# Patient Record
Sex: FEMALE | Race: WHITE | NOT HISPANIC OR LATINO | Employment: OTHER | URBAN - METROPOLITAN AREA
[De-identification: names, ages, dates, MRNs, and addresses within clinical notes are randomized per-mention and may not be internally consistent; named-entity substitution may affect disease eponyms.]

---

## 2017-05-09 ENCOUNTER — GENERIC CONVERSION - ENCOUNTER (OUTPATIENT)
Dept: OTHER | Facility: OTHER | Age: 57
End: 2017-05-09

## 2017-07-12 ENCOUNTER — GENERIC CONVERSION - ENCOUNTER (OUTPATIENT)
Dept: OTHER | Facility: OTHER | Age: 57
End: 2017-07-12

## 2017-08-14 ENCOUNTER — ALLSCRIPTS OFFICE VISIT (OUTPATIENT)
Dept: OTHER | Facility: OTHER | Age: 57
End: 2017-08-14

## 2017-09-19 ENCOUNTER — TRANSCRIBE ORDERS (OUTPATIENT)
Dept: ADMINISTRATIVE | Facility: HOSPITAL | Age: 57
End: 2017-09-19

## 2017-09-19 DIAGNOSIS — Z12.31 VISIT FOR SCREENING MAMMOGRAM: Primary | ICD-10-CM

## 2017-10-20 DIAGNOSIS — M47.816 SPONDYLOSIS OF LUMBAR REGION WITHOUT MYELOPATHY OR RADICULOPATHY: ICD-10-CM

## 2017-10-20 DIAGNOSIS — E55.9 VITAMIN D DEFICIENCY: ICD-10-CM

## 2017-10-20 DIAGNOSIS — I73.00 RAYNAUD'S SYNDROME WITHOUT GANGRENE: ICD-10-CM

## 2017-10-25 ENCOUNTER — HOSPITAL ENCOUNTER (OUTPATIENT)
Dept: MAMMOGRAPHY | Facility: MEDICAL CENTER | Age: 57
Discharge: HOME | End: 2017-10-25
Payer: COMMERCIAL

## 2017-10-25 DIAGNOSIS — Z12.31 VISIT FOR SCREENING MAMMOGRAM: ICD-10-CM

## 2017-10-25 PROCEDURE — 77063 BREAST TOMOSYNTHESIS BI: CPT

## 2017-10-25 PROCEDURE — G0202 SCR MAMMO BI INCL CAD: HCPCS

## 2017-11-28 ENCOUNTER — APPOINTMENT (OUTPATIENT)
Dept: LAB | Facility: CLINIC | Age: 57
End: 2017-11-28
Payer: COMMERCIAL

## 2017-11-28 ENCOUNTER — TRANSCRIBE ORDERS (OUTPATIENT)
Dept: LAB | Facility: CLINIC | Age: 57
End: 2017-11-28

## 2017-11-28 DIAGNOSIS — I73.00 RAYNAUD'S SYNDROME WITHOUT GANGRENE: ICD-10-CM

## 2017-11-28 DIAGNOSIS — M47.816 SPONDYLOSIS OF LUMBAR REGION WITHOUT MYELOPATHY OR RADICULOPATHY: ICD-10-CM

## 2017-11-28 DIAGNOSIS — E55.9 VITAMIN D DEFICIENCY: ICD-10-CM

## 2017-11-28 LAB
25(OH)D3 SERPL-MCNC: 35.8 NG/ML (ref 30–100)
ALBUMIN SERPL BCP-MCNC: 4.2 G/DL (ref 3.5–5)
ALP SERPL-CCNC: 48 U/L (ref 46–116)
ALT SERPL W P-5'-P-CCNC: 30 U/L (ref 12–78)
ANION GAP SERPL CALCULATED.3IONS-SCNC: 5 MMOL/L (ref 4–13)
AST SERPL W P-5'-P-CCNC: 22 U/L (ref 5–45)
BASOPHILS # BLD AUTO: 0.02 THOUSANDS/ΜL (ref 0–0.1)
BASOPHILS NFR BLD AUTO: 0 % (ref 0–1)
BILIRUB SERPL-MCNC: 0.84 MG/DL (ref 0.2–1)
BILIRUB UR QL STRIP: NEGATIVE
BUN SERPL-MCNC: 19 MG/DL (ref 5–25)
CALCIUM SERPL-MCNC: 9.6 MG/DL (ref 8.3–10.1)
CHLORIDE SERPL-SCNC: 101 MMOL/L (ref 100–108)
CHOLEST SERPL-MCNC: 226 MG/DL (ref 50–200)
CLARITY UR: CLEAR
CO2 SERPL-SCNC: 29 MMOL/L (ref 21–32)
COLOR UR: YELLOW
CREAT SERPL-MCNC: 0.79 MG/DL (ref 0.6–1.3)
EOSINOPHIL # BLD AUTO: 0.1 THOUSAND/ΜL (ref 0–0.61)
EOSINOPHIL NFR BLD AUTO: 2 % (ref 0–6)
ERYTHROCYTE [DISTWIDTH] IN BLOOD BY AUTOMATED COUNT: 12.7 % (ref 11.6–15.1)
GFR SERPL CREATININE-BSD FRML MDRD: 83 ML/MIN/1.73SQ M
GLUCOSE P FAST SERPL-MCNC: 96 MG/DL (ref 65–99)
GLUCOSE UR STRIP-MCNC: NEGATIVE MG/DL
HCT VFR BLD AUTO: 44 % (ref 34.8–46.1)
HDLC SERPL-MCNC: 105 MG/DL (ref 40–60)
HGB BLD-MCNC: 15.2 G/DL (ref 11.5–15.4)
HGB UR QL STRIP.AUTO: NEGATIVE
KETONES UR STRIP-MCNC: NEGATIVE MG/DL
LDLC SERPL CALC-MCNC: 106 MG/DL (ref 0–100)
LEUKOCYTE ESTERASE UR QL STRIP: NEGATIVE
LYMPHOCYTES # BLD AUTO: 1.53 THOUSANDS/ΜL (ref 0.6–4.47)
LYMPHOCYTES NFR BLD AUTO: 29 % (ref 14–44)
MCH RBC QN AUTO: 32.5 PG (ref 26.8–34.3)
MCHC RBC AUTO-ENTMCNC: 34.5 G/DL (ref 31.4–37.4)
MCV RBC AUTO: 94 FL (ref 82–98)
MONOCYTES # BLD AUTO: 0.32 THOUSAND/ΜL (ref 0.17–1.22)
MONOCYTES NFR BLD AUTO: 6 % (ref 4–12)
NEUTROPHILS # BLD AUTO: 3.28 THOUSANDS/ΜL (ref 1.85–7.62)
NEUTS SEG NFR BLD AUTO: 63 % (ref 43–75)
NITRITE UR QL STRIP: NEGATIVE
NRBC BLD AUTO-RTO: 0 /100 WBCS
PH UR STRIP.AUTO: 7 [PH] (ref 4.5–8)
PLATELET # BLD AUTO: 225 THOUSANDS/UL (ref 149–390)
PMV BLD AUTO: 11.6 FL (ref 8.9–12.7)
POTASSIUM SERPL-SCNC: 4.3 MMOL/L (ref 3.5–5.3)
PROT SERPL-MCNC: 7.5 G/DL (ref 6.4–8.2)
PROT UR STRIP-MCNC: NEGATIVE MG/DL
RBC # BLD AUTO: 4.68 MILLION/UL (ref 3.81–5.12)
SODIUM SERPL-SCNC: 135 MMOL/L (ref 136–145)
SP GR UR STRIP.AUTO: 1.01 (ref 1–1.03)
TRIGL SERPL-MCNC: 75 MG/DL
UROBILINOGEN UR QL STRIP.AUTO: 0.2 E.U./DL
WBC # BLD AUTO: 5.25 THOUSAND/UL (ref 4.31–10.16)

## 2017-11-28 PROCEDURE — 81003 URINALYSIS AUTO W/O SCOPE: CPT

## 2017-11-28 PROCEDURE — 85025 COMPLETE CBC W/AUTO DIFF WBC: CPT

## 2017-11-28 PROCEDURE — 80053 COMPREHEN METABOLIC PANEL: CPT

## 2017-11-28 PROCEDURE — 36415 COLL VENOUS BLD VENIPUNCTURE: CPT

## 2017-11-28 PROCEDURE — 82306 VITAMIN D 25 HYDROXY: CPT

## 2017-11-28 PROCEDURE — 80061 LIPID PANEL: CPT

## 2017-12-07 ENCOUNTER — APPOINTMENT (OUTPATIENT)
Dept: LAB | Facility: CLINIC | Age: 57
End: 2017-12-07
Payer: COMMERCIAL

## 2017-12-07 ENCOUNTER — ALLSCRIPTS OFFICE VISIT (OUTPATIENT)
Dept: OTHER | Facility: OTHER | Age: 57
End: 2017-12-07

## 2017-12-07 DIAGNOSIS — L65.9 NONSCARRING HAIR LOSS: ICD-10-CM

## 2017-12-07 LAB — TSH SERPL DL<=0.05 MIU/L-ACNC: 1.6 UIU/ML (ref 0.36–3.74)

## 2017-12-07 PROCEDURE — 36415 COLL VENOUS BLD VENIPUNCTURE: CPT

## 2017-12-07 PROCEDURE — 84443 ASSAY THYROID STIM HORMONE: CPT

## 2018-01-10 NOTE — PROGRESS NOTES
Assessment    1  Encounter for preventive health examination (V70 0) (Z00 00)   2  Degenerative joint disease (DJD) of lumbar spine (721 3) (S79 416)    Discussion/Summary    Tarah Rodriguez is up-to-date on preventive care and is managing her chronic back pain well  Her lifestyle is optimal  Lab work reviewed with her today was also optimal including normal vitamin D level with vitamin D supplementation  Follow-up will be as needed and otherwise in one year  Chief Complaint  pt is here for a physical exam      History of Present Illness  HPI: Tarah Rodriguez is here for her annual preventive exam  Recently her gynecologist sent her mammogram and DEXA scan, remarkable for negative mammogram and mild osteopenia  Tarah Rodriguez had a colonoscopy at age 48 that was negative and is on a 10 year recall list is negative GI review of systems  She sees her eye doctor annually and has reading glasses with stable vision  Chronic 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     She sees her dermatologist once a year plus as needed as her father  of melanoma earlier this year  Her dermatologist is aware of this  Alex reports no new skin lesions  She has been doing physical therapy and using an inversion table for chronic low back arthritis related problems including degenerative disc disease, occasional sciatic pain, and having to lift at work as a physical therapist for many years  She now gets help from her physical therapy assistance so she does less lifting  Her sciatic pain has resolved  She is trying to decide whether or not to change her schedule to part-time work  She enjoys her work and doesn't want to quit  Review of Systems    Constitutional: No fever, no chills, feels well, no tiredness, no recent weight gain or weight loss  Eyes: No complaints of eye pain, no red eyes, no eyesight problems, no discharge, no dry eyes, no itching of eyes     ENT: no complaints of earache, no loss of hearing, no nose bleeds, no nasal discharge, no sore throat, no hoarseness  Cardiovascular: No complaints of slow heart rate, no fast heart rate, no chest pain, no palpitations, no leg claudication, no lower extremity edema  Respiratory: No complaints of shortness of breath, no wheezing, no cough, no SOB on exertion, no orthopnea, no PND  Gastrointestinal: No complaints of abdominal pain, no constipation, no nausea or vomiting, no diarrhea, no bloody stools  Genitourinary: No complaints of dysuria, no incontinence, no pelvic pain, no dysmenorrhea, no vaginal discharge or bleeding  Musculoskeletal: arthralgias and joint stiffness, but no joint swelling, no limb pain, no myalgias and no limb swelling  Integumentary: No complaints of skin rash or lesions, no itching, no skin wounds, no breast pain or lump  Neurological: No complaints of headache, no confusion, no convulsions, no numbness, no dizziness or fainting, no tingling, no limb weakness, no difficulty walking  Psychiatric: Not suicidal, no sleep disturbance, no anxiety or depression, no change in personality, no emotional problems  Endocrine: No complaints of proptosis, no hot flashes, no muscle weakness, no deepening of the voice, no feelings of weakness  Hematologic/Lymphatic: No complaints of swollen glands, no swollen glands in the neck, does not bleed easily, does not bruise easily  Active Problems    1  Allergic rhinitis (477 9) (J30 9)   2  Raynaud's syndrome without gangrene (443 0) (I73 00)   3  Rosacea (695 3) (L71 9)   4   Vitamin D deficiency (268 9) (E55 9)    Family History  Mother    · Family history of atrial fibrillation (V17 49) (Z82 49)   · Family history of Heart disease   · Family history of Hypertension  Father    · Family history of Melanoma  Sister    · Family history of atrial fibrillation (V17 49) (Z82 49)  Family History    · Family history of Heart Disease (V17 49)   · Family history of Hypertension (V17 49)    Social History    · Being A Social Drinker   · Never A Smoker   · Denied: History of Tobacco Use    Current Meds   1  Fluticasone Propionate 50 MCG/ACT Nasal Suspension; 2 sprays in each nostril at   bedtime; Therapy: [id number] to (Last Rx:19Nov2015)  Requested for: [id number] Ordered   2  Vitamin D3 1000 UNIT Oral Capsule; TAKE 4 CAPSULE Daily; Therapy: [id number] to (Last Rx:19Nov2015) Ordered    Allergies    1  No Known Drug Allergies    Vitals   Recorded: 21Nov2016 04:23PM   Heart Rate 70   Systolic 683   Diastolic 82   Height 5 ft 4 in   Weight 122 lb 6 oz   BMI Calculated 21 01   BSA Calculated 1 59   O2 Saturation 94     Physical Exam    Constitutional   General appearance: No acute distress, well appearing and well nourished  Head and Face   Head and face: Normal     Eyes   Conjunctiva and lids: No swelling, erythema or discharge  Pupils and irises: Equal, round, reactive to light  Ears, Nose, Mouth, and Throat   External inspection of ears and nose: Normal     Otoscopic examination: Tympanic membranes translucent with normal light reflex  Canals patent without erythema  Hearing: Normal     Nasal mucosa, septum, and turbinates: Normal without edema or erythema  Lips, teeth, and gums: Normal, good dentition  Oropharynx: Normal with no erythema, edema, exudate or lesions  Neck   Neck: Supple, symmetric, trachea midline, no masses  Thyroid: Normal, no thyromegaly  Pulmonary   Respiratory effort: No increased work of breathing or signs of respiratory distress  Percussion of chest: Normal     Auscultation of lungs: Clear to auscultation  Cardiovascular   Auscultation of heart: Normal rate and rhythm, normal S1 and S2, no murmurs  Carotid pulses: 2+ bilaterally  Abdominal aorta: Normal     Pedal pulses: 2+ bilaterally      Peripheral vascular exam: Normal     Examination of extremities for edema and/or varicosities: Normal     Abdomen   Abdomen: Non-tender, no masses  Liver and spleen: No hepatomegaly or splenomegaly  Examination for hernias: No hernia appreciated  Lymphatic   Palpation of lymph nodes in neck: No lymphadenopathy  Palpation of lymph nodes in other areas: No lymphadenopathy  Musculoskeletal   Gait and station: Normal     Digits and nails: Normal without clubbing or cyanosis  Joints, bones, and muscles: Normal     Range of motion: Normal     Stability: Normal     Muscle strength/tone: Normal     Skin   Skin and subcutaneous tissue: Normal without rashes or lesions  Neurologic   Cranial nerves: Cranial nerves II-XII intact  Cortical function: Normal mental status  Sensation: No sensory loss  Coordination: Normal finger to nose and heel to shin  Psychiatric   Judgment and insight: Normal     Orientation to person, place, and time: Normal     Recent and remote memory: Intact      Mood and affect: Normal        Signatures   Electronically signed by : JANY Maravilla ; Nov 21 2016  4:58PM EST                       (Author)

## 2018-01-13 VITALS
BODY MASS INDEX: 21.25 KG/M2 | OXYGEN SATURATION: 98 % | WEIGHT: 124.5 LBS | HEIGHT: 64 IN | DIASTOLIC BLOOD PRESSURE: 80 MMHG | SYSTOLIC BLOOD PRESSURE: 150 MMHG | TEMPERATURE: 97.8 F | HEART RATE: 64 BPM

## 2018-01-23 VITALS
BODY MASS INDEX: 20.86 KG/M2 | OXYGEN SATURATION: 98 % | DIASTOLIC BLOOD PRESSURE: 66 MMHG | SYSTOLIC BLOOD PRESSURE: 118 MMHG | HEART RATE: 74 BPM | WEIGHT: 122.19 LBS | HEIGHT: 64 IN

## 2018-01-23 NOTE — PROGRESS NOTES
Assessment   1  Encounter for preventive health examination (V70 0) (Z00 00)    Plan  Alopecia of scalp    · (1) TSH; Status:Active; Requested for:[id number];     Discussion/Summary    Prevention: Alec Roberts will continue to see Dermatology every 6 months with father dying earlier this year melanoma  She will also make an appointment for an eye exam as it has been 5 years  Vision is excellent and she wears reading glasses  Gyn care is up-to-date  Annual lab work was reviewed today from November twenty-eighth, and was ideal     Lifestyle is also ideal with adjustment in work to part-time, with last stress, more time exercise and she is eating well  Flu shot was given, and also Zostavax and Prevnar were administered today, with Pneumovax in 1 year  Shaina Diaz was encouraged to call between annual visits for any questions or problems and the next visit was scheduled for 1 year  Chief Complaint  Yearly well exam      History of Present Illness  HPI: Shaina Diaz is here for annual preventive exam and feels well  We reviewed her November 28th lab work which was ideal     Shaina Diaz exercises regularly, has great exercise tolerance, no history of heart disease and very favorable lipids  Alec Roberts has history of low back pain but this is not an active problem and she avoids high impact physical activity  Flonase is controlling her allergies year-round  She sees Dermatology every 6 months because her father has a history of melanoma and actually passed away earlier this year from melanoma  She reports no new skin lesions  She is also seen by Dermatology for rosacea  She also is noticing some thinning of her hair since menopause, and did have some thinning of her hair earlier life, and baldness runs in her mother's side of the family   There is no history of thyroid disease but thought it be worthwhile to check a TSH which was ordered at this time and I did suggest that Alec Roberts discuss her thinning hair with her dermatologist as well     Immunizations were updated today  Colonoscopy was performed at age 48, GI review of systems is negative and Marley Granados is on a 10 year recall list     Gyn care is up-to-date  I examinations are on up-to-date and she has excellent vision but I did suggest an eye exam     Review of systems: As above, all others negative      Active Problems   1  Allergic rhinitis (477 9) (J30 9)  2  Degenerative joint disease (DJD) of lumbar spine (721 3) (M47 816)  3  Raynaud's syndrome without gangrene (443 0) (I73 00)  4  Rosacea (695 3) (L71 9)  5  Vitamin D deficiency (268 9) (E55 9)    Family History  Mother    · Family history of atrial fibrillation (V17 49) (Z82 49)   · Family history of Heart disease   · Family history of Hypertension  Father    · Family history of Melanoma  Sister    · Family history of atrial fibrillation (V17 49) (Z82 49)  Family History    · Family history of Heart Disease (V17 49)   · Family history of Hypertension (V17 49)    Social History    · Being A Social Drinker   · Never a smoker   · Denied: History of Tobacco Use    Current Meds  1  Fluticasone Propionate 50 MCG/ACT Nasal Suspension; 2 sprays in each nostril at   bedtime; Therapy: [id number] to (Last Rx:28Nov2016)  Requested for: 28Nov2016 Ordered  2  Vitamin D3 1000 UNIT Oral Capsule; TAKE 4 CAPSULE Daily; Therapy: [id number] to (Last Rx:19Nov2015) Ordered    Allergies   1  No Known Drug Allergies    Vitals   Recorded: 25SEC7596 01:06PM   Heart Rate 74   Systolic 376, LUE, Sitting   Diastolic 72, LUE, Sitting   Height 5 ft 4 in   Weight 122 lb 3 oz   BMI Calculated 20 97   BSA Calculated 1 59   O2 Saturation 98     Physical Exam    Constitutional   General appearance: No acute distress, well appearing and well nourished  Head and Face   Head and face: Normal     Palpation of the face and sinuses: No sinus tenderness  Eyes   Conjunctiva and lids: No swelling, erythema or discharge      Pupils and irises: Equal, round, reactive to light     Ears, Nose, Mouth, and Throat   External inspection of ears and nose: Normal     Otoscopic examination: Tympanic membranes translucent with normal light reflex  Canals patent without erythema  Hearing: Normal     Nasal mucosa, septum, and turbinates: Normal without edema or erythema  Lips, teeth, and gums: Normal, good dentition  Oropharynx: Normal with no erythema, edema, exudate or lesions  Neck   Neck: Supple, symmetric, trachea midline, no masses  Thyroid: Normal, no thyromegaly  Pulmonary   Respiratory effort: No increased work of breathing or signs of respiratory distress  Auscultation of lungs: Clear to auscultation  Cardiovascular   Auscultation of heart: Normal rate and rhythm, normal S1 and S2, no murmurs  Carotid pulses: 2+ bilaterally  Abdominal aorta: Normal     Pedal pulses: 2+ bilaterally  Peripheral vascular exam: Normal     Examination of extremities for edema and/or varicosities: Normal     Abdomen   Abdomen: Non-tender, no masses  Liver and spleen: No hepatomegaly or splenomegaly  Examination for hernias: No hernia appreciated  Lymphatic   Palpation of lymph nodes in neck: No lymphadenopathy  Palpation of lymph nodes in other areas: No lymphadenopathy  Musculoskeletal   Gait and station: Normal     Digits and nails: Normal without clubbing or cyanosis  Joints, bones, and muscles: Normal     Range of motion: Normal     Stability: Normal     Muscle strength/tone: Normal     Skin No change of mild rosacea of the face, with some thinning of hair of the scalp, especially frontal scalp  Neurologic   Cranial nerves: Cranial nerves II-XII intact  Cortical function: Normal mental status  Sensation: No sensory loss  Coordination: Normal finger to nose and heel to shin  Psychiatric   Judgment and insight: Normal     Orientation to person, place, and time: Normal     Recent and remote memory: Intact      Mood and affect: Normal  Signatures   Electronically signed by : JANY Blair ; Dec  7 2017  2:13PM EST                       (Author)

## 2018-07-31 DIAGNOSIS — J30.9 ALLERGIC RHINITIS, UNSPECIFIED SEASONALITY, UNSPECIFIED TRIGGER: Primary | ICD-10-CM

## 2018-07-31 RX ORDER — FLUTICASONE PROPIONATE 50 MCG
SPRAY, SUSPENSION (ML) NASAL
Qty: 1 BOTTLE | Refills: 2 | Status: SHIPPED | OUTPATIENT
Start: 2018-07-31 | End: 2019-06-24 | Stop reason: SDUPTHER

## 2018-10-15 ENCOUNTER — TELEPHONE (OUTPATIENT)
Dept: INTERNAL MEDICINE CLINIC | Facility: CLINIC | Age: 58
End: 2018-10-15

## 2018-10-15 DIAGNOSIS — Z00.00 WELL ADULT EXAM: Primary | ICD-10-CM

## 2018-10-15 NOTE — TELEPHONE ENCOUNTER
Pt will be in 12/10 for a physical  She would like to have labs prior to her appt  Which labs would you like?

## 2018-10-24 ENCOUNTER — TRANSCRIBE ORDERS (OUTPATIENT)
Dept: ADMINISTRATIVE | Facility: HOSPITAL | Age: 58
End: 2018-10-24

## 2018-10-24 DIAGNOSIS — Z12.39 SCREENING BREAST EXAMINATION: Primary | ICD-10-CM

## 2018-10-24 DIAGNOSIS — R92.2 DENSE BREAST: ICD-10-CM

## 2018-11-16 ENCOUNTER — HOSPITAL ENCOUNTER (OUTPATIENT)
Dept: MAMMOGRAPHY | Facility: CLINIC | Age: 58
Discharge: HOME | End: 2018-11-16
Payer: COMMERCIAL

## 2018-11-16 ENCOUNTER — HOSPITAL ENCOUNTER (OUTPATIENT)
Dept: ULTRASOUND IMAGING | Facility: CLINIC | Age: 58
Discharge: HOME | End: 2018-11-16
Payer: COMMERCIAL

## 2018-11-16 VITALS — WEIGHT: 115 LBS | HEIGHT: 64 IN | BODY MASS INDEX: 19.63 KG/M2

## 2018-11-16 DIAGNOSIS — R92.2 DENSE BREAST: ICD-10-CM

## 2018-11-16 DIAGNOSIS — Z12.39 SCREENING BREAST EXAMINATION: ICD-10-CM

## 2018-11-16 PROCEDURE — 76377 3D RENDER W/INTRP POSTPROCES: CPT

## 2018-11-16 PROCEDURE — 77063 BREAST TOMOSYNTHESIS BI: CPT

## 2018-11-16 PROCEDURE — 77067 SCR MAMMO BI INCL CAD: CPT

## 2018-11-16 PROCEDURE — 76641 ULTRASOUND BREAST COMPLETE: CPT

## 2018-12-05 ENCOUNTER — APPOINTMENT (OUTPATIENT)
Dept: LAB | Facility: CLINIC | Age: 58
End: 2018-12-05
Payer: COMMERCIAL

## 2018-12-05 DIAGNOSIS — Z00.00 WELL ADULT EXAM: ICD-10-CM

## 2018-12-05 LAB
ALBUMIN SERPL BCP-MCNC: 4.4 G/DL (ref 3.5–5)
ALP SERPL-CCNC: 39 U/L (ref 46–116)
ALT SERPL W P-5'-P-CCNC: 31 U/L (ref 12–78)
ANION GAP SERPL CALCULATED.3IONS-SCNC: 7 MMOL/L (ref 4–13)
AST SERPL W P-5'-P-CCNC: 20 U/L (ref 5–45)
BASOPHILS # BLD AUTO: 0.04 THOUSANDS/ΜL (ref 0–0.1)
BASOPHILS NFR BLD AUTO: 1 % (ref 0–1)
BILIRUB SERPL-MCNC: 1.04 MG/DL (ref 0.2–1)
BILIRUB UR QL STRIP: NEGATIVE
BUN SERPL-MCNC: 14 MG/DL (ref 5–25)
CALCIUM SERPL-MCNC: 9.4 MG/DL (ref 8.3–10.1)
CHLORIDE SERPL-SCNC: 99 MMOL/L (ref 100–108)
CHOLEST SERPL-MCNC: 233 MG/DL (ref 50–200)
CLARITY UR: CLEAR
CO2 SERPL-SCNC: 28 MMOL/L (ref 21–32)
COLOR UR: YELLOW
CREAT SERPL-MCNC: 0.79 MG/DL (ref 0.6–1.3)
EOSINOPHIL # BLD AUTO: 0.05 THOUSAND/ΜL (ref 0–0.61)
EOSINOPHIL NFR BLD AUTO: 1 % (ref 0–6)
ERYTHROCYTE [DISTWIDTH] IN BLOOD BY AUTOMATED COUNT: 12.6 % (ref 11.6–15.1)
GFR SERPL CREATININE-BSD FRML MDRD: 83 ML/MIN/1.73SQ M
GLUCOSE P FAST SERPL-MCNC: 95 MG/DL (ref 65–99)
GLUCOSE UR STRIP-MCNC: NEGATIVE MG/DL
HCT VFR BLD AUTO: 46.5 % (ref 34.8–46.1)
HDLC SERPL-MCNC: 112 MG/DL (ref 40–60)
HGB BLD-MCNC: 15.3 G/DL (ref 11.5–15.4)
HGB UR QL STRIP.AUTO: NEGATIVE
IMM GRANULOCYTES # BLD AUTO: 0 THOUSAND/UL (ref 0–0.2)
IMM GRANULOCYTES NFR BLD AUTO: 0 % (ref 0–2)
KETONES UR STRIP-MCNC: NEGATIVE MG/DL
LDLC SERPL CALC-MCNC: 108 MG/DL (ref 0–100)
LEUKOCYTE ESTERASE UR QL STRIP: NEGATIVE
LYMPHOCYTES # BLD AUTO: 1.57 THOUSANDS/ΜL (ref 0.6–4.47)
LYMPHOCYTES NFR BLD AUTO: 41 % (ref 14–44)
MCH RBC QN AUTO: 32.8 PG (ref 26.8–34.3)
MCHC RBC AUTO-ENTMCNC: 32.9 G/DL (ref 31.4–37.4)
MCV RBC AUTO: 100 FL (ref 82–98)
MONOCYTES # BLD AUTO: 0.33 THOUSAND/ΜL (ref 0.17–1.22)
MONOCYTES NFR BLD AUTO: 9 % (ref 4–12)
NEUTROPHILS # BLD AUTO: 1.82 THOUSANDS/ΜL (ref 1.85–7.62)
NEUTS SEG NFR BLD AUTO: 48 % (ref 43–75)
NITRITE UR QL STRIP: NEGATIVE
NONHDLC SERPL-MCNC: 121 MG/DL
NRBC BLD AUTO-RTO: 0 /100 WBCS
PH UR STRIP.AUTO: 7 [PH] (ref 4.5–8)
PLATELET # BLD AUTO: 216 THOUSANDS/UL (ref 149–390)
PMV BLD AUTO: 11.1 FL (ref 8.9–12.7)
POTASSIUM SERPL-SCNC: 4.4 MMOL/L (ref 3.5–5.3)
PROT SERPL-MCNC: 7.5 G/DL (ref 6.4–8.2)
PROT UR STRIP-MCNC: NEGATIVE MG/DL
RBC # BLD AUTO: 4.66 MILLION/UL (ref 3.81–5.12)
SODIUM SERPL-SCNC: 134 MMOL/L (ref 136–145)
SP GR UR STRIP.AUTO: 1.01 (ref 1–1.03)
TRIGL SERPL-MCNC: 66 MG/DL
TSH SERPL DL<=0.05 MIU/L-ACNC: 1.13 UIU/ML (ref 0.36–3.74)
UROBILINOGEN UR QL STRIP.AUTO: 0.2 E.U./DL
WBC # BLD AUTO: 3.81 THOUSAND/UL (ref 4.31–10.16)

## 2018-12-05 PROCEDURE — 81003 URINALYSIS AUTO W/O SCOPE: CPT

## 2018-12-05 PROCEDURE — 36415 COLL VENOUS BLD VENIPUNCTURE: CPT

## 2018-12-05 PROCEDURE — 80061 LIPID PANEL: CPT

## 2018-12-05 PROCEDURE — 84443 ASSAY THYROID STIM HORMONE: CPT

## 2018-12-05 PROCEDURE — 80053 COMPREHEN METABOLIC PANEL: CPT

## 2018-12-05 PROCEDURE — 85025 COMPLETE CBC W/AUTO DIFF WBC: CPT

## 2018-12-10 ENCOUNTER — OFFICE VISIT (OUTPATIENT)
Dept: INTERNAL MEDICINE CLINIC | Facility: CLINIC | Age: 58
End: 2018-12-10
Payer: COMMERCIAL

## 2018-12-10 VITALS
TEMPERATURE: 97.4 F | OXYGEN SATURATION: 98 % | HEIGHT: 64 IN | BODY MASS INDEX: 20.08 KG/M2 | DIASTOLIC BLOOD PRESSURE: 72 MMHG | HEART RATE: 51 BPM | SYSTOLIC BLOOD PRESSURE: 138 MMHG | WEIGHT: 117.6 LBS

## 2018-12-10 DIAGNOSIS — Z23 NEED FOR PNEUMOCOCCAL VACCINE: ICD-10-CM

## 2018-12-10 DIAGNOSIS — J30.9 ALLERGIC RHINITIS, UNSPECIFIED SEASONALITY, UNSPECIFIED TRIGGER: ICD-10-CM

## 2018-12-10 DIAGNOSIS — I73.00 RAYNAUD'S SYNDROME WITHOUT GANGRENE: ICD-10-CM

## 2018-12-10 DIAGNOSIS — Z00.00 WELL ADULT EXAM: ICD-10-CM

## 2018-12-10 DIAGNOSIS — M47.816 OSTEOARTHRITIS OF LUMBAR SPINE, UNSPECIFIED SPINAL OSTEOARTHRITIS COMPLICATION STATUS: ICD-10-CM

## 2018-12-10 DIAGNOSIS — D75.1 POLYCYTHEMIA: Primary | ICD-10-CM

## 2018-12-10 PROBLEM — L65.9 ALOPECIA OF SCALP: Status: ACTIVE | Noted: 2017-12-07

## 2018-12-10 PROCEDURE — 90732 PPSV23 VACC 2 YRS+ SUBQ/IM: CPT

## 2018-12-10 PROCEDURE — 90471 IMMUNIZATION ADMIN: CPT

## 2018-12-10 PROCEDURE — 99396 PREV VISIT EST AGE 40-64: CPT | Performed by: INTERNAL MEDICINE

## 2018-12-10 RX ORDER — DIPHENOXYLATE HYDROCHLORIDE AND ATROPINE SULFATE 2.5; .025 MG/1; MG/1
1 TABLET ORAL DAILY
COMMUNITY
End: 2019-12-23

## 2018-12-10 RX ORDER — BIOTIN 1 MG
2 TABLET ORAL DAILY
COMMUNITY
Start: 2013-08-15

## 2018-12-10 RX ORDER — CONJUGATED ESTROGENS 0.62 MG/G
CREAM VAGINAL
Refills: 0 | COMMUNITY
Start: 2018-10-24 | End: 2020-10-05 | Stop reason: SDUPTHER

## 2018-12-10 NOTE — PROGRESS NOTES
Assessment/Plan   Problem List Items Addressed This Visit     Allergic rhinitis    Degenerative joint disease (DJD) of lumbar spine    Raynaud's syndrome without gangrene      Other Visit Diagnoses     Polycythemia    -  Primary    Relevant Orders    CBC and differential    Well adult exam          [place] is lifestyle is optimal as she exercises 7 days a week and has excellent exercise tolerance  She had a colonoscopy age 48, normal with negative GI review of systems and no increased risk in the family, or personally so next colonoscopy is age 61 and she is 62  She received a flu shot this season and received Pneumovax today with Prevnar last year  Mammogram and ultrasound was November 16th by her gyn doctor at the time of her annual gyn visit which is also up-to-date  Baylee Gr does not have an optometrist or an ophthalmologist with stable vision but is in need of screening eye care  She was referred  Dental care is up-to-date  Dermatology exams are every 6 months with family history of melanoma  Annual labs from December 5th were reviewed including slight elevation of red blood count, most likely due to hemoconcentration as she had this drawn after vigorous exercise, so CBC will be recheck  Screening for sleep apnea on history and exam is negative, there is no history of smoking or high altitude exposure, and hemoglobin is only mildly elevated and there is no clinical indication of polycythemia vera  CMP right after exercise was appropriate including slight decrease in sodium of 134, chloride 99, and alkaline phosphatase is chronically low at 39 and this is not significant  Bilirubin slightly elevated 1 04  Glucose was 95 with GFR of 83  TSH was normal 1 130  Lipid panels optimal because of very high HDL cholesterol 112 with total cholesterol 237  Urinalysis was negative      Chronic low back pain has responded to modified exercise program which is low-impact without significant discomfort and no interference with lifestyle  Seasonal allergies are well controlled with fluticasone nasal spray  We discussed follow-up which can be in 1 year, sooner as needed  Subjective   Patient ID: Hima Lacey is a 62 y o  female here for annual exam and discussion of chronic medical problems  Raissa Garcia feels well overall  Vitals:    12/10/18 1335   BP: 138/72   Pulse: (!) 51   Temp: (!) 97 4 °F (36 3 °C)   SpO2: 98%     HPI   Preventive care was reviewed and please see the assessment  There are no acute problems today  Exercise routine and diet are optimal   Home life is going well and 1 of her sons is getting  in June and she is looking for to this  Raissa Garcia is enjoying [employer] 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  The following portions of the patient's history were reviewed and updated as appropriate: allergies, current medications, past family history, past medical history, past social history, past surgical history and problem list     Review of Systems some morning stiffness in low back and some pain with activity, and also see above, all others negative  Objective   Physical Exam   Vital signs stable with regular pulse  No acute distress  HEENT exam is normal   Hearing and vision grossly normal   Limited skin exam shows no rash or skin malignancy  Head neck without adenopathy or mass  Neck supple without trauma  No JVD  No carotid bruits in carotid pulses are normal   Thyroid exam normal on inspection and palpation  Trachea midline without stridor  Lungs clear to auscultation and normal to percussion posteriorly  There is no kyphosis or scoliosis noted  There is no supraclavicular adenopathy  Cardiac:  Decreased rate, regular rhythm, normal S1 and S2, no murmur, no S4 or S3  Abdomen:  Normal bowel sounds, nondistended, soft and nontender, no hernia or periumbilical adenopathy, no masses or bruits organomegaly are present    Extremities:  Mild Raynaud's phenomenon of fingers today, no nail disease  The no peripheral edema and arterial pulses are normal   Gait stable normal   No neurologic abnormalities  No cognitive deficit    Affect normal       Patient Active Problem List   Diagnosis    Allergic rhinitis    Alopecia of scalp    Degenerative joint disease (DJD) of lumbar spine    Raynaud's syndrome without gangrene    Rosacea    Vitamin D deficiency     Current Outpatient Prescriptions:     Cholecalciferol (VITAMIN D3) 1000 units CAPS, Take 4 capsules by mouth daily, Disp: , Rfl:     fluticasone (FLONASE) 50 mcg/act nasal spray, INSTILL 2 SPRAYS IN EACH NOSTRIL AT BEDTIME, Disp: 1 Bottle, Rfl: 2    Multiple Vitamins-Minerals (CENTRUM SILVER 50+WOMEN) TABS, Take by mouth, Disp: , Rfl:     multivitamin (THERAGRAN) TABS, Take 1 tablet by mouth daily, Disp: , Rfl:     PREMARIN vaginal cream, 1 GM INTRAVAGINALLY 2 TIMES WEEKLY FOR 4 WEEKS, EVERY 2 MONTHS, Disp: , Rfl: 0

## 2019-04-30 ENCOUNTER — APPOINTMENT (OUTPATIENT)
Dept: LAB | Facility: CLINIC | Age: 59
End: 2019-04-30
Payer: COMMERCIAL

## 2019-04-30 DIAGNOSIS — D75.1 POLYCYTHEMIA: ICD-10-CM

## 2019-04-30 LAB
BASOPHILS # BLD AUTO: 0.03 THOUSANDS/ΜL (ref 0–0.1)
BASOPHILS NFR BLD AUTO: 1 % (ref 0–1)
EOSINOPHIL # BLD AUTO: 0.06 THOUSAND/ΜL (ref 0–0.61)
EOSINOPHIL NFR BLD AUTO: 1 % (ref 0–6)
ERYTHROCYTE [DISTWIDTH] IN BLOOD BY AUTOMATED COUNT: 12.3 % (ref 11.6–15.1)
HCT VFR BLD AUTO: 44.9 % (ref 34.8–46.1)
HGB BLD-MCNC: 15.1 G/DL (ref 11.5–15.4)
IMM GRANULOCYTES # BLD AUTO: 0.01 THOUSAND/UL (ref 0–0.2)
IMM GRANULOCYTES NFR BLD AUTO: 0 % (ref 0–2)
LYMPHOCYTES # BLD AUTO: 1.62 THOUSANDS/ΜL (ref 0.6–4.47)
LYMPHOCYTES NFR BLD AUTO: 27 % (ref 14–44)
MCH RBC QN AUTO: 33.3 PG (ref 26.8–34.3)
MCHC RBC AUTO-ENTMCNC: 33.6 G/DL (ref 31.4–37.4)
MCV RBC AUTO: 99 FL (ref 82–98)
MONOCYTES # BLD AUTO: 0.52 THOUSAND/ΜL (ref 0.17–1.22)
MONOCYTES NFR BLD AUTO: 9 % (ref 4–12)
NEUTROPHILS # BLD AUTO: 3.75 THOUSANDS/ΜL (ref 1.85–7.62)
NEUTS SEG NFR BLD AUTO: 62 % (ref 43–75)
NRBC BLD AUTO-RTO: 0 /100 WBCS
PLATELET # BLD AUTO: 197 THOUSANDS/UL (ref 149–390)
PMV BLD AUTO: 11.3 FL (ref 8.9–12.7)
RBC # BLD AUTO: 4.54 MILLION/UL (ref 3.81–5.12)
WBC # BLD AUTO: 5.99 THOUSAND/UL (ref 4.31–10.16)

## 2019-04-30 PROCEDURE — 36415 COLL VENOUS BLD VENIPUNCTURE: CPT

## 2019-04-30 PROCEDURE — 85025 COMPLETE CBC W/AUTO DIFF WBC: CPT

## 2019-05-02 ENCOUNTER — TELEPHONE (OUTPATIENT)
Dept: INTERNAL MEDICINE CLINIC | Facility: CLINIC | Age: 59
End: 2019-05-02

## 2019-06-24 DIAGNOSIS — J30.9 ALLERGIC RHINITIS, UNSPECIFIED SEASONALITY, UNSPECIFIED TRIGGER: ICD-10-CM

## 2019-06-24 RX ORDER — FLUTICASONE PROPIONATE 50 MCG
SPRAY, SUSPENSION (ML) NASAL
Qty: 1 BOTTLE | Refills: 2 | Status: SHIPPED | OUTPATIENT
Start: 2019-06-24 | End: 2020-07-06 | Stop reason: SDUPTHER

## 2019-08-02 ENCOUNTER — OFFICE VISIT (OUTPATIENT)
Dept: INTERNAL MEDICINE CLINIC | Facility: CLINIC | Age: 59
End: 2019-08-02
Payer: COMMERCIAL

## 2019-08-02 VITALS
HEART RATE: 68 BPM | HEIGHT: 64 IN | DIASTOLIC BLOOD PRESSURE: 80 MMHG | WEIGHT: 113 LBS | SYSTOLIC BLOOD PRESSURE: 138 MMHG | TEMPERATURE: 98.5 F | BODY MASS INDEX: 19.29 KG/M2

## 2019-08-02 DIAGNOSIS — J02.9 PHARYNGITIS, UNSPECIFIED ETIOLOGY: Primary | ICD-10-CM

## 2019-08-02 PROCEDURE — 1036F TOBACCO NON-USER: CPT | Performed by: INTERNAL MEDICINE

## 2019-08-02 PROCEDURE — 3008F BODY MASS INDEX DOCD: CPT | Performed by: INTERNAL MEDICINE

## 2019-08-02 PROCEDURE — 99213 OFFICE O/P EST LOW 20 MIN: CPT | Performed by: INTERNAL MEDICINE

## 2019-08-02 RX ORDER — AMOXICILLIN 500 MG/1
500 CAPSULE ORAL EVERY 8 HOURS SCHEDULED
Qty: 30 CAPSULE | Refills: 0 | Status: SHIPPED | OUTPATIENT
Start: 2019-08-02 | End: 2019-08-12

## 2019-08-02 NOTE — PROGRESS NOTES
Assessment/Plan:     Diagnoses and all orders for this visit:    Pharyngitis, unspecified etiology  -     amoxicillin (AMOXIL) 500 mg capsule; Take 1 capsule (500 mg total) by mouth every 8 (eight) hours for 10 days          Subjective:      Patient ID: Enrique Duffy is a 61 y o  female here today with a 4 day history of increasing sore throat, with some pain with swallowing, no fever or chills, no earache, no cough, no rash, with some slight body aches  Tahir Uribe had exudate of pharyngitis 1 month ago while away after a family wedding and then later travel to [place], was run down and had similar symptoms for about 5 days before being treated  She did respond to amoxicillin  Tahir Uribe is retired and tends to exercise every day even if she does not feel well, tends to spent a lot time volunteering, and time out of doors  She may be a bit run down and may also have had sick contacts, so we discussed rest, fluids, staying in an air-conditioned environment, ibuprofen as needed, and amoxicillin for 10 days  Tahir Uribe was told to call back if not feeling better or for any new developments in her illness  Prognosis seems good for full recovery  HPI    The following portions of the patient's history were reviewed and updated as appropriate: allergies, current medications, past family history, past medical history, past social history, past surgical history and problem list     Review of Systems  no abdominal pain, nausea vomiting or diarrhea  No UTI symptoms      Objective:      /80 (BP Location: Left arm, Patient Position: Sitting, Cuff Size: Standard)   Pulse 68   Temp 98 5 °F (36 9 °C) (Tympanic)   Ht 5' 4" (1 626 m)   Wt 51 3 kg (113 lb)   PF 99 L/min   BMI 19 40 kg/m²      Wt Readings from Last 3 Encounters:   08/02/19 51 3 kg (113 lb)   12/10/18 53 3 kg (117 lb 9 6 oz)   11/16/18 52 2 kg (115 lb)     Temp Readings from Last 3 Encounters:   08/02/19 98 5 °F (36 9 °C) (Tympanic)   12/10/18 (!) 97 4 °F (36 3 °C) (Tympanic)   08/14/17 97 8 °F (36 6 °C)     BP Readings from Last 3 Encounters:   08/02/19 138/80   12/10/18 138/72   12/07/17 118/66     Pulse Readings from Last 3 Encounters:   08/02/19 68   12/10/18 (!) 51   12/07/17 74        Physical Exam    Vital signs stable, afebrile and appears nontoxic  Skin well hydrated without rash  Neck supple  HEENT exam notable for erythema of the pharynx without exudates or hemorrhages, no airway compromise  Nasal passages unremarkable as well as exam of the ears  There is mild submandibular adenopathy with slight tenderness, otherwise no other adenopathy, lungs clear cardiac exam is normal   Patient Active Problem List   Diagnosis    Allergic rhinitis    Alopecia of scalp    Degenerative joint disease (DJD) of lumbar spine    Raynaud's syndrome without gangrene    Rosacea    Vitamin D deficiency       Current Outpatient Medications on File Prior to Visit   Medication Sig Dispense Refill    fluticasone (FLONASE) 50 mcg/act nasal spray INSTILL 2 SPRAYS IN EACH NOSTRIL AT BEDTIME 1 Bottle 2    PREMARIN vaginal cream 1 GM INTRAVAGINALLY 2 TIMES WEEKLY FOR 4 WEEKS, EVERY 2 MONTHS  0    Cholecalciferol (VITAMIN D3) 1000 units CAPS Take 4 capsules by mouth daily      Multiple Vitamins-Minerals (CENTRUM SILVER 50+WOMEN) TABS Take by mouth      multivitamin (THERAGRAN) TABS Take 1 tablet by mouth daily       No current facility-administered medications on file prior to visit

## 2019-10-29 ENCOUNTER — TRANSCRIBE ORDERS (OUTPATIENT)
Dept: ADMINISTRATIVE | Facility: HOSPITAL | Age: 59
End: 2019-10-29

## 2019-10-29 DIAGNOSIS — Z12.31 VISIT FOR SCREENING MAMMOGRAM: Primary | ICD-10-CM

## 2019-12-03 ENCOUNTER — TELEPHONE (OUTPATIENT)
Dept: INTERNAL MEDICINE CLINIC | Facility: CLINIC | Age: 59
End: 2019-12-03

## 2019-12-03 DIAGNOSIS — Z13.220 SCREENING FOR HYPERLIPIDEMIA: ICD-10-CM

## 2019-12-03 DIAGNOSIS — M47.816 OSTEOARTHRITIS OF LUMBAR SPINE, UNSPECIFIED SPINAL OSTEOARTHRITIS COMPLICATION STATUS: Primary | ICD-10-CM

## 2019-12-03 DIAGNOSIS — I73.00 RAYNAUD'S SYNDROME WITHOUT GANGRENE: ICD-10-CM

## 2019-12-11 ENCOUNTER — TRANSCRIBE ORDERS (OUTPATIENT)
Dept: LAB | Facility: CLINIC | Age: 59
End: 2019-12-11

## 2019-12-11 ENCOUNTER — APPOINTMENT (OUTPATIENT)
Dept: LAB | Facility: CLINIC | Age: 59
End: 2019-12-11
Payer: COMMERCIAL

## 2019-12-11 LAB
ALBUMIN SERPL BCP-MCNC: 4.9 G/DL (ref 3.5–5)
ALP SERPL-CCNC: 44 U/L (ref 46–116)
ALT SERPL W P-5'-P-CCNC: 35 U/L (ref 12–78)
ANION GAP SERPL CALCULATED.3IONS-SCNC: 6 MMOL/L (ref 4–13)
AST SERPL W P-5'-P-CCNC: 22 U/L (ref 5–45)
BASOPHILS # BLD AUTO: 0.03 THOUSANDS/ΜL (ref 0–0.1)
BASOPHILS NFR BLD AUTO: 1 % (ref 0–1)
BILIRUB SERPL-MCNC: 0.79 MG/DL (ref 0.2–1)
BILIRUB UR QL STRIP: NEGATIVE
BUN SERPL-MCNC: 16 MG/DL (ref 5–25)
CALCIUM SERPL-MCNC: 9.9 MG/DL (ref 8.3–10.1)
CHLORIDE SERPL-SCNC: 101 MMOL/L (ref 100–108)
CHOLEST SERPL-MCNC: 252 MG/DL (ref 50–200)
CLARITY UR: CLEAR
CO2 SERPL-SCNC: 30 MMOL/L (ref 21–32)
COLOR UR: YELLOW
CREAT SERPL-MCNC: 0.76 MG/DL (ref 0.6–1.3)
EOSINOPHIL # BLD AUTO: 0.08 THOUSAND/ΜL (ref 0–0.61)
EOSINOPHIL NFR BLD AUTO: 2 % (ref 0–6)
ERYTHROCYTE [DISTWIDTH] IN BLOOD BY AUTOMATED COUNT: 12.3 % (ref 11.6–15.1)
GFR SERPL CREATININE-BSD FRML MDRD: 86 ML/MIN/1.73SQ M
GLUCOSE P FAST SERPL-MCNC: 94 MG/DL (ref 65–99)
GLUCOSE UR STRIP-MCNC: NEGATIVE MG/DL
HCT VFR BLD AUTO: 46.4 % (ref 34.8–46.1)
HDLC SERPL-MCNC: 106 MG/DL
HGB BLD-MCNC: 15.5 G/DL (ref 11.5–15.4)
HGB UR QL STRIP.AUTO: NEGATIVE
IMM GRANULOCYTES # BLD AUTO: 0.01 THOUSAND/UL (ref 0–0.2)
IMM GRANULOCYTES NFR BLD AUTO: 0 % (ref 0–2)
KETONES UR STRIP-MCNC: NEGATIVE MG/DL
LDLC SERPL CALC-MCNC: 134 MG/DL (ref 0–100)
LEUKOCYTE ESTERASE UR QL STRIP: NEGATIVE
LYMPHOCYTES # BLD AUTO: 1.51 THOUSANDS/ΜL (ref 0.6–4.47)
LYMPHOCYTES NFR BLD AUTO: 33 % (ref 14–44)
MCH RBC QN AUTO: 32.6 PG (ref 26.8–34.3)
MCHC RBC AUTO-ENTMCNC: 33.4 G/DL (ref 31.4–37.4)
MCV RBC AUTO: 98 FL (ref 82–98)
MONOCYTES # BLD AUTO: 0.35 THOUSAND/ΜL (ref 0.17–1.22)
MONOCYTES NFR BLD AUTO: 8 % (ref 4–12)
NEUTROPHILS # BLD AUTO: 2.62 THOUSANDS/ΜL (ref 1.85–7.62)
NEUTS SEG NFR BLD AUTO: 56 % (ref 43–75)
NITRITE UR QL STRIP: NEGATIVE
NONHDLC SERPL-MCNC: 146 MG/DL
NRBC BLD AUTO-RTO: 0 /100 WBCS
PH UR STRIP.AUTO: 7 [PH]
PLATELET # BLD AUTO: 223 THOUSANDS/UL (ref 149–390)
PMV BLD AUTO: 10.7 FL (ref 8.9–12.7)
POTASSIUM SERPL-SCNC: 4.5 MMOL/L (ref 3.5–5.3)
PROT SERPL-MCNC: 7.8 G/DL (ref 6.4–8.2)
PROT UR STRIP-MCNC: NEGATIVE MG/DL
RBC # BLD AUTO: 4.75 MILLION/UL (ref 3.81–5.12)
SODIUM SERPL-SCNC: 137 MMOL/L (ref 136–145)
SP GR UR STRIP.AUTO: 1.01 (ref 1–1.03)
TRIGL SERPL-MCNC: 60 MG/DL
UROBILINOGEN UR QL STRIP.AUTO: 0.2 E.U./DL
WBC # BLD AUTO: 4.6 THOUSAND/UL (ref 4.31–10.16)

## 2019-12-11 PROCEDURE — 85025 COMPLETE CBC W/AUTO DIFF WBC: CPT | Performed by: INTERNAL MEDICINE

## 2019-12-11 PROCEDURE — 80061 LIPID PANEL: CPT | Performed by: INTERNAL MEDICINE

## 2019-12-11 PROCEDURE — 80053 COMPREHEN METABOLIC PANEL: CPT | Performed by: INTERNAL MEDICINE

## 2019-12-11 PROCEDURE — 81003 URINALYSIS AUTO W/O SCOPE: CPT | Performed by: INTERNAL MEDICINE

## 2019-12-11 PROCEDURE — 36415 COLL VENOUS BLD VENIPUNCTURE: CPT | Performed by: INTERNAL MEDICINE

## 2019-12-12 ENCOUNTER — HOSPITAL ENCOUNTER (OUTPATIENT)
Dept: MAMMOGRAPHY | Facility: CLINIC | Age: 59
Discharge: HOME | End: 2019-12-12
Payer: COMMERCIAL

## 2019-12-12 VITALS — BODY MASS INDEX: 19.29 KG/M2 | HEIGHT: 64 IN | WEIGHT: 113 LBS

## 2019-12-12 DIAGNOSIS — Z12.31 VISIT FOR SCREENING MAMMOGRAM: ICD-10-CM

## 2019-12-12 PROCEDURE — 77063 BREAST TOMOSYNTHESIS BI: CPT

## 2019-12-12 PROCEDURE — 77067 SCR MAMMO BI INCL CAD: CPT

## 2019-12-16 ENCOUNTER — TELEPHONE (OUTPATIENT)
Dept: INTERNAL MEDICINE CLINIC | Facility: CLINIC | Age: 59
End: 2019-12-16

## 2019-12-16 NOTE — TELEPHONE ENCOUNTER
Please notify Emani Pretty that I did review these labs and just reviewed again and will discuss at her upcoming appointment  Anastasiia's labs are stable if you would inform her  Thanks

## 2019-12-23 ENCOUNTER — APPOINTMENT (OUTPATIENT)
Dept: LAB | Facility: CLINIC | Age: 59
End: 2019-12-23
Payer: COMMERCIAL

## 2019-12-23 ENCOUNTER — OFFICE VISIT (OUTPATIENT)
Dept: INTERNAL MEDICINE CLINIC | Facility: CLINIC | Age: 59
End: 2019-12-23
Payer: COMMERCIAL

## 2019-12-23 VITALS
WEIGHT: 117.6 LBS | BODY MASS INDEX: 20.08 KG/M2 | HEART RATE: 67 BPM | TEMPERATURE: 98.1 F | RESPIRATION RATE: 15 BRPM | DIASTOLIC BLOOD PRESSURE: 64 MMHG | HEIGHT: 64 IN | SYSTOLIC BLOOD PRESSURE: 118 MMHG

## 2019-12-23 DIAGNOSIS — Z00.00 WELL ADULT EXAM: Primary | ICD-10-CM

## 2019-12-23 DIAGNOSIS — D75.1 POLYCYTHEMIA: ICD-10-CM

## 2019-12-23 DIAGNOSIS — I73.00 RAYNAUD'S SYNDROME WITHOUT GANGRENE: ICD-10-CM

## 2019-12-23 PROCEDURE — 36415 COLL VENOUS BLD VENIPUNCTURE: CPT

## 2019-12-23 PROCEDURE — 82668 ASSAY OF ERYTHROPOIETIN: CPT

## 2019-12-23 PROCEDURE — 99396 PREV VISIT EST AGE 40-64: CPT | Performed by: INTERNAL MEDICINE

## 2019-12-23 NOTE — PROGRESS NOTES
Assessment/Plan:     Diagnoses and all orders for this visit:    Well adult exam    Polycythemia  -     Erythropoietin; Future    Raynaud's syndrome without gangrene          Subjective:      Patient ID: Ramon Og is a 61 y o  female  HPI  Isaiha Asher is here today for annual preventive exam and is feeling well  She had her  recently retired and are going to spend the collier in [place] and looking for to this  Isaiah Asher is very active, does a lot of aerobic activity and has borderline hemoglobin and hematocrit, and recent hemoglobin hematocrit are once again mildly elevated 15 5, and 46 4 respectively, on December 11th, similar to last year with intervening CBC showing high normal hemoglobin hematocrit earlier this year  Screening for sleep apnea is negative and there is no high altitude exposure, no history of smoking, and this may reflect significant aerobic conditioning, and we did discuss her concerns about polycythemia vera which she read about  She does not have pleuritis or fatigue, and is reasonable to or erythropoietin level  Today's exam was unremarkable and she will be notified of results  CBC is stable with very high , indicating mild elevation of   CMP shows alkaline phosphatase slightly low at 44, not clinically significant, blood sugar 94 with negative urinalysis as well  Radha Cam feels well and is up-to-date on preventive care  We discussed the new shingles vaccine and she wants to wait until she comes back from [place] will call the office for nursing visit for vaccination  Radha Cam was encouraged to return in 6 months, sooner as needed  Lifestyle is optimal was encouraged to continue    The following portions of the patient's history were reviewed and updated as appropriate: allergies, current medications, past family history, past medical history, past social history, past surgical history and problem list     Review of Systems  no change in occasional Raynaud's symptoms  Also see above, all others negative  Objective:      /64 (BP Location: Left arm, Patient Position: Sitting, Cuff Size: Standard)   Pulse 67   Temp 98 1 °F (36 7 °C)   Resp 15   Ht 5' 4" (1 626 m)   Wt 53 3 kg (117 lb 9 6 oz)   BMI 20 19 kg/m²      Wt Readings from Last 3 Encounters:   12/23/19 53 3 kg (117 lb 9 6 oz)   12/12/19 51 3 kg (113 lb)   08/02/19 51 3 kg (113 lb)     Temp Readings from Last 3 Encounters:   12/23/19 98 1 °F (36 7 °C)   08/02/19 98 5 °F (36 9 °C) (Tympanic)   12/10/18 (!) 97 4 °F (36 3 °C) (Tympanic)     BP Readings from Last 3 Encounters:   12/23/19 118/64   08/02/19 138/80   12/10/18 138/72     Pulse Readings from Last 3 Encounters:   12/23/19 67   08/02/19 68   12/10/18 (!) 51        Physical Exam    Vital signs stable, in no acute distress  Appears healthy, in good spirits  HEENT exam normal   Skin without plethora, no pruritus or rash  Head neck without mass or adenopathy, no JVD  No carotid bruits  Lungs clear cardiac exam normal   Abdomen benign including no hepatosplenomegaly or mass  There is no periumbilical adenopathy or adenopathy in other areas  Circulation intact  Joint function well preserved  Cognitive status normal   Neurologic exam unremarkable    Mood optimal   Patient Active Problem List   Diagnosis    Allergic rhinitis    Alopecia of scalp    Degenerative joint disease (DJD) of lumbar spine    Raynaud's syndrome without gangrene    Rosacea    Vitamin D deficiency    Polycythemia       Current Outpatient Medications on File Prior to Visit   Medication Sig Dispense Refill    Cholecalciferol (VITAMIN D3) 1000 units CAPS Take 4 capsules by mouth daily      fluticasone (FLONASE) 50 mcg/act nasal spray INSTILL 2 SPRAYS IN EACH NOSTRIL AT BEDTIME 1 Bottle 2    Multiple Vitamins-Minerals (CENTRUM SILVER 50+WOMEN) TABS Take by mouth      PREMARIN vaginal cream 1 GM INTRAVAGINALLY 2 TIMES WEEKLY FOR 4 WEEKS, EVERY 2 MONTHS  0    [DISCONTINUED] multivitamin (THERAGRAN) TABS Take 1 tablet by mouth daily       No current facility-administered medications on file prior to visit

## 2019-12-24 LAB — EPO SERPL-ACNC: 11.3 MIU/ML (ref 2.6–18.5)

## 2020-07-06 DIAGNOSIS — J30.9 ALLERGIC RHINITIS, UNSPECIFIED SEASONALITY, UNSPECIFIED TRIGGER: ICD-10-CM

## 2020-07-07 RX ORDER — FLUTICASONE PROPIONATE 50 MCG
1 SPRAY, SUSPENSION (ML) NASAL DAILY
Qty: 1 BOTTLE | Refills: 2 | Status: SHIPPED | OUTPATIENT
Start: 2020-07-07 | End: 2020-09-11 | Stop reason: SDUPTHER

## 2020-08-17 ENCOUNTER — TELEPHONE (OUTPATIENT)
Dept: INTERNAL MEDICINE CLINIC | Facility: CLINIC | Age: 60
End: 2020-08-17

## 2020-08-17 DIAGNOSIS — I73.00 RAYNAUD'S SYNDROME WITHOUT GANGRENE: ICD-10-CM

## 2020-08-17 DIAGNOSIS — E55.9 VITAMIN D DEFICIENCY: ICD-10-CM

## 2020-08-17 DIAGNOSIS — D75.1 POLYCYTHEMIA: Primary | ICD-10-CM

## 2020-08-17 NOTE — TELEPHONE ENCOUNTER
Patient will be coming in on 9/28/2020 and would like to know if she can have an order for her labs prior to coming so that the results can be reviewed at the time of her appointment

## 2020-09-11 DIAGNOSIS — J30.9 ALLERGIC RHINITIS, UNSPECIFIED SEASONALITY, UNSPECIFIED TRIGGER: ICD-10-CM

## 2020-09-11 RX ORDER — FLUTICASONE PROPIONATE 50 MCG
1 SPRAY, SUSPENSION (ML) NASAL DAILY
Qty: 1 BOTTLE | Refills: 2 | Status: SHIPPED | OUTPATIENT
Start: 2020-09-11 | End: 2021-09-30 | Stop reason: SDUPTHER

## 2020-09-23 ENCOUNTER — APPOINTMENT (OUTPATIENT)
Dept: LAB | Facility: CLINIC | Age: 60
End: 2020-09-23
Payer: COMMERCIAL

## 2020-09-23 LAB
25(OH)D3 SERPL-MCNC: 29.9 NG/ML (ref 30–100)
ALBUMIN SERPL BCP-MCNC: 4.3 G/DL (ref 3.5–5)
ALP SERPL-CCNC: 44 U/L (ref 46–116)
ALT SERPL W P-5'-P-CCNC: 31 U/L (ref 12–78)
ANION GAP SERPL CALCULATED.3IONS-SCNC: 2 MMOL/L (ref 4–13)
AST SERPL W P-5'-P-CCNC: 23 U/L (ref 5–45)
BASOPHILS # BLD AUTO: 0.04 THOUSANDS/ΜL (ref 0–0.1)
BASOPHILS NFR BLD AUTO: 1 % (ref 0–1)
BILIRUB SERPL-MCNC: 1.16 MG/DL (ref 0.2–1)
BUN SERPL-MCNC: 14 MG/DL (ref 5–25)
CALCIUM SERPL-MCNC: 9.8 MG/DL (ref 8.3–10.1)
CHLORIDE SERPL-SCNC: 103 MMOL/L (ref 100–108)
CHOLEST SERPL-MCNC: 245 MG/DL (ref 50–200)
CO2 SERPL-SCNC: 33 MMOL/L (ref 21–32)
CREAT SERPL-MCNC: 0.65 MG/DL (ref 0.6–1.3)
EOSINOPHIL # BLD AUTO: 0.06 THOUSAND/ΜL (ref 0–0.61)
EOSINOPHIL NFR BLD AUTO: 1 % (ref 0–6)
ERYTHROCYTE [DISTWIDTH] IN BLOOD BY AUTOMATED COUNT: 12.6 % (ref 11.6–15.1)
GFR SERPL CREATININE-BSD FRML MDRD: 97 ML/MIN/1.73SQ M
GLUCOSE P FAST SERPL-MCNC: 97 MG/DL (ref 65–99)
HCT VFR BLD AUTO: 45.5 % (ref 34.8–46.1)
HDLC SERPL-MCNC: 110 MG/DL
HGB BLD-MCNC: 14.5 G/DL (ref 11.5–15.4)
IMM GRANULOCYTES # BLD AUTO: 0.01 THOUSAND/UL (ref 0–0.2)
IMM GRANULOCYTES NFR BLD AUTO: 0 % (ref 0–2)
LDLC SERPL CALC-MCNC: 124 MG/DL (ref 0–100)
LYMPHOCYTES # BLD AUTO: 1.74 THOUSANDS/ΜL (ref 0.6–4.47)
LYMPHOCYTES NFR BLD AUTO: 36 % (ref 14–44)
MCH RBC QN AUTO: 32.5 PG (ref 26.8–34.3)
MCHC RBC AUTO-ENTMCNC: 31.9 G/DL (ref 31.4–37.4)
MCV RBC AUTO: 102 FL (ref 82–98)
MONOCYTES # BLD AUTO: 0.4 THOUSAND/ΜL (ref 0.17–1.22)
MONOCYTES NFR BLD AUTO: 8 % (ref 4–12)
NEUTROPHILS # BLD AUTO: 2.58 THOUSANDS/ΜL (ref 1.85–7.62)
NEUTS SEG NFR BLD AUTO: 54 % (ref 43–75)
NRBC BLD AUTO-RTO: 0 /100 WBCS
PLATELET # BLD AUTO: 203 THOUSANDS/UL (ref 149–390)
PMV BLD AUTO: 11 FL (ref 8.9–12.7)
POTASSIUM SERPL-SCNC: 4.8 MMOL/L (ref 3.5–5.3)
PROT SERPL-MCNC: 7.1 G/DL (ref 6.4–8.2)
RBC # BLD AUTO: 4.46 MILLION/UL (ref 3.81–5.12)
SODIUM SERPL-SCNC: 138 MMOL/L (ref 136–145)
TRIGL SERPL-MCNC: 55 MG/DL
TSH SERPL DL<=0.05 MIU/L-ACNC: 1.37 UIU/ML (ref 0.36–3.74)
WBC # BLD AUTO: 4.83 THOUSAND/UL (ref 4.31–10.16)

## 2020-09-23 PROCEDURE — 85025 COMPLETE CBC W/AUTO DIFF WBC: CPT | Performed by: INTERNAL MEDICINE

## 2020-09-23 PROCEDURE — 80061 LIPID PANEL: CPT | Performed by: INTERNAL MEDICINE

## 2020-09-23 PROCEDURE — 82306 VITAMIN D 25 HYDROXY: CPT | Performed by: INTERNAL MEDICINE

## 2020-09-23 PROCEDURE — 80053 COMPREHEN METABOLIC PANEL: CPT | Performed by: INTERNAL MEDICINE

## 2020-09-23 PROCEDURE — 36415 COLL VENOUS BLD VENIPUNCTURE: CPT | Performed by: INTERNAL MEDICINE

## 2020-09-23 PROCEDURE — 84443 ASSAY THYROID STIM HORMONE: CPT | Performed by: INTERNAL MEDICINE

## 2020-09-28 ENCOUNTER — OFFICE VISIT (OUTPATIENT)
Dept: INTERNAL MEDICINE CLINIC | Facility: CLINIC | Age: 60
End: 2020-09-28
Payer: COMMERCIAL

## 2020-09-28 VITALS
SYSTOLIC BLOOD PRESSURE: 117 MMHG | TEMPERATURE: 97.2 F | HEART RATE: 75 BPM | RESPIRATION RATE: 12 BRPM | HEIGHT: 64 IN | BODY MASS INDEX: 19.97 KG/M2 | DIASTOLIC BLOOD PRESSURE: 70 MMHG | WEIGHT: 117 LBS

## 2020-09-28 DIAGNOSIS — Z23 ENCOUNTER FOR IMMUNIZATION: ICD-10-CM

## 2020-09-28 DIAGNOSIS — I73.00 RAYNAUD'S SYNDROME WITHOUT GANGRENE: ICD-10-CM

## 2020-09-28 DIAGNOSIS — E55.9 VITAMIN D DEFICIENCY: ICD-10-CM

## 2020-09-28 DIAGNOSIS — Z12.31 ENCOUNTER FOR SCREENING MAMMOGRAM FOR BREAST CANCER: ICD-10-CM

## 2020-09-28 DIAGNOSIS — Z00.00 PHYSICAL EXAM, ANNUAL: Primary | ICD-10-CM

## 2020-09-28 DIAGNOSIS — M85.80 OSTEOPENIA, UNSPECIFIED LOCATION: ICD-10-CM

## 2020-09-28 DIAGNOSIS — D75.89 MACROCYTOSIS: ICD-10-CM

## 2020-09-28 PROCEDURE — 90682 RIV4 VACC RECOMBINANT DNA IM: CPT | Performed by: INTERNAL MEDICINE

## 2020-09-28 PROCEDURE — 1036F TOBACCO NON-USER: CPT | Performed by: INTERNAL MEDICINE

## 2020-09-28 PROCEDURE — 99396 PREV VISIT EST AGE 40-64: CPT | Performed by: INTERNAL MEDICINE

## 2020-09-28 PROCEDURE — 90471 IMMUNIZATION ADMIN: CPT | Performed by: INTERNAL MEDICINE

## 2020-09-28 NOTE — PROGRESS NOTES
Assessment/Plan     Healthy female exam      1  Discussed her blood work results  Mild macrocytosis without anemia  Will check a G00 and folic acid level  2 lipomas was stable in size  Does see Dermatology regularly  Will arrange for DEXA scan (low BMI)  2  Patient Counseling:  --Nutrition: Stressed importance of moderation in sodium/caffeine intake, saturated fat and cholesterol, caloric balance, sufficient intake of fresh fruits, vegetables, fiber, calcium, iron, and 1 mg of folate supplement per day (for females capable of pregnancy)  --Exercise: Stressed the importance of regular exercise  --Immunizations reviewed and updated  --Metabolic screening was reviewed and updated, high HDL and LDL  No statin needed  --Cancer screening was reviewed and updated, up-to-date on mammogram, last colonoscopy in 2011 she was asked to return back in 5 years due to mom's history of polyps but her colonoscopy was normal   She tends to follow-up next year    3  Discussed the patient's BMI with her  The BMI is in the acceptable range  4  Follow up in one year  Subjective Louie Mohs is a 61 y o  female and is here for a comprehensive physical exam  The patient reports no problems  Keeps herself busy  Diet has been quite healthy as well  Exercises in the side is a week  Raynaud syndrome has been stable  Takes vitamin-D supplements regularly  The following portions of the patient's history were reviewed and updated as appropriate: current medications, past medical history, past social history and past surgical history  Review of Systems  Review of Systems   Constitutional: Negative for fatigue, fever and unexpected weight change  HENT: Negative for ear pain, hearing loss and sore throat  Eyes: Negative for pain and discharge  Respiratory: Negative for cough, chest tightness and shortness of breath  Cardiovascular: Negative for chest pain and palpitations     Gastrointestinal: Negative for abdominal pain, blood in stool, constipation, diarrhea and nausea  Genitourinary: Negative for dysuria, frequency and hematuria  Musculoskeletal: Negative for arthralgias and joint swelling  Skin: Negative for rash  Allergic/Immunologic: Negative for immunocompromised state  Neurological: Negative for dizziness and headaches  Hematological: Negative for adenopathy  Psychiatric/Behavioral: Negative for confusion and sleep disturbance  /70 Comment: home reading  Pulse 75   Temp (!) 97 2 °F (36 2 °C)   Resp 12   Ht 5' 4" (1 626 m)   Wt 53 1 kg (117 lb)   BMI 20 08 kg/m²      Physical Exam  Vitals signs and nursing note reviewed  Constitutional:       Appearance: Normal appearance  She is well-developed  HENT:      Head: Normocephalic and atraumatic  Right Ear: Tympanic membrane normal       Left Ear: Tympanic membrane normal       Nose: Nose normal    Eyes:      General:         Right eye: No discharge  Left eye: No discharge  Conjunctiva/sclera: Conjunctivae normal       Pupils: Pupils are equal, round, and reactive to light  Neck:      Musculoskeletal: Normal range of motion and neck supple  Thyroid: No thyromegaly  Cardiovascular:      Rate and Rhythm: Normal rate and regular rhythm  Chest Wall: PMI is not displaced  Heart sounds: Normal heart sounds, S1 normal and S2 normal  No murmur  Pulmonary:      Effort: Pulmonary effort is normal  No accessory muscle usage or respiratory distress  Breath sounds: Normal breath sounds  No rhonchi or rales  Abdominal:      General: Bowel sounds are normal       Palpations: Abdomen is soft  There is no shifting dullness  Tenderness: There is no abdominal tenderness  There is no rebound  Musculoskeletal: Normal range of motion  General: No tenderness  Lymphadenopathy:      Cervical: No cervical adenopathy  Skin:     General: Skin is warm  Findings: No rash  Neurological:      Mental Status: She is alert     Psychiatric:         Speech: Speech normal

## 2020-10-05 ENCOUNTER — ANNUAL EXAM (OUTPATIENT)
Dept: OBGYN CLINIC | Facility: MEDICAL CENTER | Age: 60
End: 2020-10-05
Payer: COMMERCIAL

## 2020-10-05 VITALS — BODY MASS INDEX: 20.31 KG/M2 | WEIGHT: 118.3 LBS | SYSTOLIC BLOOD PRESSURE: 118 MMHG | DIASTOLIC BLOOD PRESSURE: 74 MMHG

## 2020-10-05 DIAGNOSIS — Z01.419 ENCOUNTER FOR GYNECOLOGICAL EXAMINATION WITH PAPANICOLAOU SMEAR OF CERVIX: Primary | ICD-10-CM

## 2020-10-05 DIAGNOSIS — N89.8 VAGINAL DRYNESS: ICD-10-CM

## 2020-10-05 DIAGNOSIS — Z01.419 ENCOUNTER FOR WELL WOMAN EXAM WITH ROUTINE GYNECOLOGICAL EXAM: ICD-10-CM

## 2020-10-05 PROCEDURE — G0145 SCR C/V CYTO,THINLAYER,RESCR: HCPCS | Performed by: OBSTETRICS & GYNECOLOGY

## 2020-10-05 PROCEDURE — 87624 HPV HI-RISK TYP POOLED RSLT: CPT | Performed by: OBSTETRICS & GYNECOLOGY

## 2020-10-05 PROCEDURE — S0610 ANNUAL GYNECOLOGICAL EXAMINA: HCPCS | Performed by: OBSTETRICS & GYNECOLOGY

## 2020-10-05 RX ORDER — CONJUGATED ESTROGENS 0.62 MG/G
1 CREAM VAGINAL 2 TIMES WEEKLY
Qty: 90 G | Refills: 3 | Status: SHIPPED | OUTPATIENT
Start: 2020-10-05

## 2020-10-11 LAB
HPV HR 12 DNA CVX QL NAA+PROBE: NEGATIVE
HPV16 DNA CVX QL NAA+PROBE: NEGATIVE
HPV18 DNA CVX QL NAA+PROBE: NEGATIVE

## 2020-10-12 ENCOUNTER — CLINICAL SUPPORT (OUTPATIENT)
Dept: INTERNAL MEDICINE CLINIC | Facility: CLINIC | Age: 60
End: 2020-10-12
Payer: COMMERCIAL

## 2020-10-12 DIAGNOSIS — Z23 ENCOUNTER FOR IMMUNIZATION: Primary | ICD-10-CM

## 2020-10-12 LAB
LAB AP GYN PRIMARY INTERPRETATION: NORMAL
Lab: NORMAL

## 2020-10-12 PROCEDURE — 90750 HZV VACC RECOMBINANT IM: CPT

## 2020-10-12 PROCEDURE — 90471 IMMUNIZATION ADMIN: CPT

## 2020-10-20 ENCOUNTER — HOSPITAL ENCOUNTER (OUTPATIENT)
Dept: BONE DENSITY | Facility: MEDICAL CENTER | Age: 60
Discharge: HOME | End: 2020-10-20
Payer: COMMERCIAL

## 2020-10-20 DIAGNOSIS — M85.80 OSTEOPENIA, UNSPECIFIED LOCATION: ICD-10-CM

## 2020-10-20 PROCEDURE — 77080 DXA BONE DENSITY AXIAL: CPT

## 2020-10-23 ENCOUNTER — TELEPHONE (OUTPATIENT)
Dept: INTERNAL MEDICINE CLINIC | Facility: CLINIC | Age: 60
End: 2020-10-23

## 2020-12-09 ENCOUNTER — APPOINTMENT (OUTPATIENT)
Dept: LAB | Facility: CLINIC | Age: 60
End: 2020-12-09
Payer: COMMERCIAL

## 2020-12-09 LAB
BASOPHILS # BLD AUTO: 0.04 THOUSANDS/ΜL (ref 0–0.1)
BASOPHILS NFR BLD AUTO: 1 % (ref 0–1)
EOSINOPHIL # BLD AUTO: 0.05 THOUSAND/ΜL (ref 0–0.61)
EOSINOPHIL NFR BLD AUTO: 1 % (ref 0–6)
ERYTHROCYTE [DISTWIDTH] IN BLOOD BY AUTOMATED COUNT: 12.8 % (ref 11.6–15.1)
FOLATE SERPL-MCNC: >20 NG/ML (ref 3.1–17.5)
HCT VFR BLD AUTO: 44.9 % (ref 34.8–46.1)
HGB BLD-MCNC: 14.8 G/DL (ref 11.5–15.4)
IMM GRANULOCYTES # BLD AUTO: 0.01 THOUSAND/UL (ref 0–0.2)
IMM GRANULOCYTES NFR BLD AUTO: 0 % (ref 0–2)
LYMPHOCYTES # BLD AUTO: 1.87 THOUSANDS/ΜL (ref 0.6–4.47)
LYMPHOCYTES NFR BLD AUTO: 29 % (ref 14–44)
MCH RBC QN AUTO: 32.5 PG (ref 26.8–34.3)
MCHC RBC AUTO-ENTMCNC: 33 G/DL (ref 31.4–37.4)
MCV RBC AUTO: 99 FL (ref 82–98)
MONOCYTES # BLD AUTO: 0.42 THOUSAND/ΜL (ref 0.17–1.22)
MONOCYTES NFR BLD AUTO: 7 % (ref 4–12)
NEUTROPHILS # BLD AUTO: 3.96 THOUSANDS/ΜL (ref 1.85–7.62)
NEUTS SEG NFR BLD AUTO: 62 % (ref 43–75)
NRBC BLD AUTO-RTO: 0 /100 WBCS
PLATELET # BLD AUTO: 233 THOUSANDS/UL (ref 149–390)
PMV BLD AUTO: 10.6 FL (ref 8.9–12.7)
RBC # BLD AUTO: 4.56 MILLION/UL (ref 3.81–5.12)
VIT B12 SERPL-MCNC: 805 PG/ML (ref 100–900)
WBC # BLD AUTO: 6.35 THOUSAND/UL (ref 4.31–10.16)

## 2020-12-09 PROCEDURE — 82607 VITAMIN B-12: CPT | Performed by: INTERNAL MEDICINE

## 2020-12-09 PROCEDURE — 85025 COMPLETE CBC W/AUTO DIFF WBC: CPT | Performed by: INTERNAL MEDICINE

## 2020-12-09 PROCEDURE — 82746 ASSAY OF FOLIC ACID SERUM: CPT | Performed by: INTERNAL MEDICINE

## 2020-12-09 PROCEDURE — 36415 COLL VENOUS BLD VENIPUNCTURE: CPT | Performed by: INTERNAL MEDICINE

## 2020-12-10 ENCOUNTER — TELEPHONE (OUTPATIENT)
Dept: INTERNAL MEDICINE CLINIC | Facility: CLINIC | Age: 60
End: 2020-12-10

## 2020-12-15 ENCOUNTER — CLINICAL SUPPORT (OUTPATIENT)
Dept: INTERNAL MEDICINE CLINIC | Facility: CLINIC | Age: 60
End: 2020-12-15
Payer: COMMERCIAL

## 2020-12-15 DIAGNOSIS — Z23 ENCOUNTER FOR IMMUNIZATION: Primary | ICD-10-CM

## 2020-12-15 PROCEDURE — 90750 HZV VACC RECOMBINANT IM: CPT

## 2020-12-15 PROCEDURE — 90471 IMMUNIZATION ADMIN: CPT

## 2021-04-13 DIAGNOSIS — Z23 ENCOUNTER FOR IMMUNIZATION: ICD-10-CM

## 2021-06-21 ENCOUNTER — HOSPITAL ENCOUNTER (OUTPATIENT)
Dept: MAMMOGRAPHY | Facility: MEDICAL CENTER | Age: 61
Discharge: HOME | End: 2021-06-21
Payer: COMMERCIAL

## 2021-06-21 VITALS — HEIGHT: 64 IN | BODY MASS INDEX: 20.14 KG/M2 | WEIGHT: 118 LBS

## 2021-06-21 DIAGNOSIS — Z12.31 ENCOUNTER FOR SCREENING MAMMOGRAM FOR BREAST CANCER: ICD-10-CM

## 2021-06-21 PROCEDURE — 77063 BREAST TOMOSYNTHESIS BI: CPT

## 2021-06-21 PROCEDURE — 77067 SCR MAMMO BI INCL CAD: CPT

## 2021-07-26 ENCOUNTER — TELEPHONE (OUTPATIENT)
Dept: INTERNAL MEDICINE CLINIC | Facility: CLINIC | Age: 61
End: 2021-07-26

## 2021-07-26 DIAGNOSIS — Z13.1 SCREENING FOR DIABETES MELLITUS: ICD-10-CM

## 2021-07-26 DIAGNOSIS — D75.1 POLYCYTHEMIA: ICD-10-CM

## 2021-07-26 DIAGNOSIS — M85.80 OSTEOPENIA, UNSPECIFIED LOCATION: ICD-10-CM

## 2021-07-26 DIAGNOSIS — E55.9 VITAMIN D DEFICIENCY: Primary | ICD-10-CM

## 2021-07-26 DIAGNOSIS — Z13.220 SCREENING FOR HYPERLIPIDEMIA: ICD-10-CM

## 2021-07-26 NOTE — TELEPHONE ENCOUNTER
Patient has upcoming physical and asking for blood work to be entered and she goes to [facility]    Thank you

## 2021-09-01 ENCOUNTER — RA CDI HCC (OUTPATIENT)
Dept: OTHER | Facility: HOSPITAL | Age: 61
End: 2021-09-01

## 2021-09-01 NOTE — PROGRESS NOTES
[facility] 75  coding opportunities       Chart reviewed, no opportunity found: CHART REVIEWED, NO OPPORTUNITY FOUND                        Patients insurance company: [employer] (Medicare Advantage and Commercial)

## 2021-09-03 LAB
25(OH)D3 SERPL-MCNC: 37 NG/ML (ref 30–100)
ALBUMIN SERPL-MCNC: 4.6 G/DL (ref 3.6–5.1)
ALBUMIN/GLOB SERPL: 1.8 (CALC) (ref 1–2.5)
ALP SERPL-CCNC: 42 U/L (ref 37–153)
ALT SERPL-CCNC: 24 U/L (ref 6–29)
APPEARANCE UR: CLEAR
AST SERPL-CCNC: 23 U/L (ref 10–35)
BASOPHILS # BLD AUTO: 19 CELLS/UL (ref 0–200)
BASOPHILS NFR BLD AUTO: 0.4 %
BILIRUB SERPL-MCNC: 0.8 MG/DL (ref 0.2–1.2)
BILIRUB UR QL STRIP: NEGATIVE
BUN SERPL-MCNC: 18 MG/DL (ref 7–25)
BUN/CREAT SERPL: ABNORMAL (CALC) (ref 6–22)
CALCIUM SERPL-MCNC: 9.8 MG/DL (ref 8.6–10.4)
CHLORIDE SERPL-SCNC: 100 MMOL/L (ref 98–110)
CHOLEST SERPL-MCNC: 235 MG/DL
CHOLEST/HDLC SERPL: 2.3 (CALC)
CO2 SERPL-SCNC: 28 MMOL/L (ref 20–32)
COLOR UR: YELLOW
CREAT SERPL-MCNC: 0.74 MG/DL (ref 0.5–0.99)
EOSINOPHIL # BLD AUTO: 61 CELLS/UL (ref 15–500)
EOSINOPHIL NFR BLD AUTO: 1.3 %
ERYTHROCYTE [DISTWIDTH] IN BLOOD BY AUTOMATED COUNT: 12.4 % (ref 11–15)
GLOBULIN SER CALC-MCNC: 2.5 G/DL (CALC) (ref 1.9–3.7)
GLUCOSE SERPL-MCNC: 108 MG/DL (ref 65–99)
GLUCOSE UR QL STRIP: NEGATIVE
HCT VFR BLD AUTO: 43.7 % (ref 35–45)
HDLC SERPL-MCNC: 101 MG/DL
HGB BLD-MCNC: 14.7 G/DL (ref 11.7–15.5)
HGB UR QL STRIP: NEGATIVE
KETONES UR QL STRIP: NEGATIVE
LDLC SERPL CALC-MCNC: 119 MG/DL (CALC)
LEUKOCYTE ESTERASE UR QL STRIP: NEGATIVE
LYMPHOCYTES # BLD AUTO: 1335 CELLS/UL (ref 850–3900)
LYMPHOCYTES NFR BLD AUTO: 28.4 %
MCH RBC QN AUTO: 32.4 PG (ref 27–33)
MCHC RBC AUTO-ENTMCNC: 33.6 G/DL (ref 32–36)
MCV RBC AUTO: 96.3 FL (ref 80–100)
MONOCYTES # BLD AUTO: 310 CELLS/UL (ref 200–950)
MONOCYTES NFR BLD AUTO: 6.6 %
NEUTROPHILS # BLD AUTO: 2975 CELLS/UL (ref 1500–7800)
NEUTROPHILS NFR BLD AUTO: 63.3 %
NITRITE UR QL STRIP: NEGATIVE
NONHDLC SERPL-MCNC: 134 MG/DL (CALC)
PH UR STRIP: 7 [PH] (ref 5–8)
PLATELET # BLD AUTO: 197 THOUSAND/UL (ref 140–400)
PMV BLD REES-ECKER: 11 FL (ref 7.5–12.5)
POTASSIUM SERPL-SCNC: 5.5 MMOL/L (ref 3.5–5.3)
PROT SERPL-MCNC: 7.1 G/DL (ref 6.1–8.1)
PROT UR QL STRIP: NEGATIVE
RBC # BLD AUTO: 4.54 MILLION/UL (ref 3.8–5.1)
SL AMB EGFR AFRICAN AMERICAN: 101 ML/MIN/1.73M2
SL AMB EGFR NON AFRICAN AMERICAN: 87 ML/MIN/1.73M2
SODIUM SERPL-SCNC: 135 MMOL/L (ref 135–146)
SP GR UR STRIP: 1 (ref 1–1.03)
TRIGL SERPL-MCNC: 59 MG/DL
TSH SERPL-ACNC: 1.35 MIU/L (ref 0.4–4.5)
WBC # BLD AUTO: 4.7 THOUSAND/UL (ref 3.8–10.8)

## 2021-09-08 ENCOUNTER — OFFICE VISIT (OUTPATIENT)
Dept: INTERNAL MEDICINE CLINIC | Facility: CLINIC | Age: 61
End: 2021-09-08
Payer: COMMERCIAL

## 2021-09-08 VITALS
OXYGEN SATURATION: 98 % | DIASTOLIC BLOOD PRESSURE: 82 MMHG | RESPIRATION RATE: 16 BRPM | BODY MASS INDEX: 19.81 KG/M2 | SYSTOLIC BLOOD PRESSURE: 136 MMHG | HEIGHT: 64 IN | HEART RATE: 70 BPM | TEMPERATURE: 97.8 F | WEIGHT: 116 LBS

## 2021-09-08 DIAGNOSIS — Z11.4 SCREENING FOR HIV (HUMAN IMMUNODEFICIENCY VIRUS): ICD-10-CM

## 2021-09-08 DIAGNOSIS — Z11.59 NEED FOR HEPATITIS C SCREENING TEST: ICD-10-CM

## 2021-09-08 DIAGNOSIS — Z00.00 WELL ADULT EXAM: Primary | ICD-10-CM

## 2021-09-08 PROCEDURE — 3008F BODY MASS INDEX DOCD: CPT | Performed by: INTERNAL MEDICINE

## 2021-09-08 PROCEDURE — 99396 PREV VISIT EST AGE 40-64: CPT | Performed by: INTERNAL MEDICINE

## 2021-09-08 PROCEDURE — 1036F TOBACCO NON-USER: CPT | Performed by: INTERNAL MEDICINE

## 2021-09-08 PROCEDURE — 3725F SCREEN DEPRESSION PERFORMED: CPT | Performed by: INTERNAL MEDICINE

## 2021-09-08 NOTE — PROGRESS NOTES
Assessment/Plan:    1  Well adult exam     2  Need for hepatitis C screening test  Hepatitis C Antibody ([facility])   3  Screening for HIV (human immunodeficiency virus)  HIV 1/2 Antigen/Antibody (4th Generation) w Reflex [facility]        A&P Notes:    Next checkup is in 1 year for a wellness exam   Preventive cares up-to-date  Lifestyle is optimal     Subjective:      Patient ID: Nir Wolfe is a 64 y o  female who is here for an annual preventive exam   Jimena Tapia now lives in [place] from the beginning of December today and of April  She and her  are enjoying senior care together  Their 2 sons are  and doing well  They are both retired and 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  There are no acute medical issues  We discussed diagnosis of osteopenia, and lifestyle is optimal and to a significant degree this appears to be a combination of genetics a postmenopausal status  Plans recheck DEXA scan 1 year  Preventive care is up-to-date on detailed review  Lifestyle is optimal including regular exercise, with light weightlifting, playing golf, walking regularly  Occasional thoracic back pain response to stretching and is not interfering with quality of life  Seasonal allergies are well controlled  Currently not being treated for rosacea and is followed by Dermatology  Lab work was reviewed and is optimal   We discussed normal blood sugar 108, and very high HDL cholesterol contributing to her total cholesterol elevation                    Family History   Problem Relation Age of Onset    Atrial fibrillation Mother     Heart disease Mother     Hypertension Mother     Melanoma Father 80    Heart disease Family     Hypertension Family     No Known Problems Maternal Grandmother     No Known Problems Paternal Grandmother     Lung cancer Paternal Aunt 80        Social History     Socioeconomic History    Marital status: /Civil [employer]     Spouse name: Not on file    Number of children: Not on file    Years of education: Not on file    Highest education level: Not on file   Occupational History    Not on file   Tobacco Use    Smoking status: Never Smoker    Smokeless tobacco: Never Used   Vaping Use    Vaping Use: Never used   Substance and Sexual Activity    Alcohol use: Yes    Drug use: Never    Sexual activity: Yes     Partners: Male     Birth control/protection: Post-menopausal   Other Topics Concern    Not on file   Social History Narrative    Not on file     Social Determinants of Health     Financial Resource Strain:     Difficulty of Paying Living Expenses:    Food Insecurity:     Worried About Running Out of Food in the Last Year:     [address] in the Last Year:    Transportation Needs:     Lack of Transportation (Medical):  Lack of Transportation (Non-Medical):    Physical Activity:     Days of Exercise per Week:     Minutes of Exercise per Session:    Stress:     Feeling of Stress :    Social Connections:     Frequency of Communication with Friends and Family:     Frequency of Social Gatherings with Friends and Family:     Attends Temple Services:     Active Member of Clubs or Organizations:     Attends Club or Organization Meetings:     Marital Status:    Intimate Partner Violence:     Fear of Current or Ex-Partner:     Emotionally Abused:     Physically Abused:     Sexually Abused:         No Known Allergies     Current Outpatient Medications   Medication Sig Dispense Refill    Cholecalciferol (VITAMIN D3) 1000 units CAPS Take 2 capsules by mouth daily       fluticasone (FLONASE) 50 mcg/act nasal spray 1 spray into each nostril daily 1 Bottle 2    Multiple Vitamins-Minerals (CENTRUM SILVER 50+WOMEN) TABS Take by mouth      Premarin vaginal cream Insert 1 g into the vagina 2 (two) times a week 90 g 3     No current facility-administered medications for this visit  Review of Systems as above, all others negative    Objective:      /82   Pulse 70   Temp 97 8 °F (36 6 °C)   Resp 16   Ht 5' 4" (1 626 m)   Wt 52 6 kg (116 lb)   SpO2 98%   BMI 19 91 kg/m²      Wt Readings from Last 3 Encounters:   09/08/21 52 6 kg (116 lb)   06/21/21 53 5 kg (118 lb)   10/05/20 53 7 kg (118 lb 4 8 oz)     Temp Readings from Last 3 Encounters:   09/08/21 97 8 °F (36 6 °C)   09/28/20 (!) 97 2 °F (36 2 °C)   12/23/19 98 1 °F (36 7 °C)     BP Readings from Last 3 Encounters:   09/08/21 136/82   10/05/20 118/74   09/28/20 117/70     Pulse Readings from Last 3 Encounters:   09/08/21 70   09/28/20 75   12/23/19 67       Physical Exam   VSS, afebrile, pulse regular    Alert and Oriented in NAD    HEENT: NCAT, Pupils equal, 3 mm, EOEMI, no icterus or pallor, no iritis or conjunctivitis  No head or neck mass or adenopathy  Ears:  normal-appearing external auditory canals and tympanic membranes,       Neck: supple, no trauma or tenderness  No JVD  Normal carotid upstroke and descent without bruits  Trachea midline without stridor  Thyroid exam normal on inspection and palpation  No spinal tenderness, full range of motion  Lymphatics: no adenopathy throughout    Chest:  No trauma or deformity, no supraclavicular adenopathy or bruit  Chest wall expansion is symmetric and normal, expiratory phase is not prolonged  Heart:  Regular rate and rhythm, normal W0-F4, no M3-E8, no clicks murmurs or rubs  Lungs:  Clear to auscultation and normal to percussion  Back:  No trauma or deformity, no CVA mass or CVA tenderness  Skin:  No rash or skin malignancy  Abdomen:  Nondistended, normal bowel sounds, soft nontender without masses bruits organomegaly  There is no hernia  There are no surgical scars  Extremities:  Arterial circulation is symmetrically normal with no clubbing cyanosis or edema    There are no varicosities, there is no calf tenderness or phlebitic findings  Joints:  No  swelling, redness, tenderness or increased temperature, no instability  There are no tophi  Affect:  Normal    Neurologic:  Normal and stable gait, and otherwise no focal findings as well  Cognitive: Intact          I have reviewed pertinent labs:  CBC:   Lab Results   Component Value Date    WBC 4 7 09/02/2021    RBC 4 54 09/02/2021    HGB 14 7 09/02/2021    HCT 43 7 09/02/2021    MCV 96 3 09/02/2021     09/02/2021    MCH 32 4 09/02/2021    MCHC 33 6 09/02/2021    RDW 12 4 09/02/2021    MPV 10 6 12/09/2020    NEUTROABS 2,975 09/02/2021     CMP:   Lab Results   Component Value Date    SODIUM 135 09/02/2021    K 5 5 (H) 09/02/2021     09/02/2021    CO2 28 09/02/2021    AGAP 2 (L) 09/23/2020    BUN 18 09/02/2021    CREATININE 0 74 09/02/2021    GLUC 108 (H) 09/02/2021    GLUF 97 09/23/2020    CALCIUM 9 8 09/02/2021    AST 23 09/02/2021    ALT 24 09/02/2021    ALKPHOS 42 09/02/2021    TP 7 1 09/02/2021    ALB 4 6 09/02/2021    TBILI 0 8 09/02/2021    EGFR 97 09/23/2020     Lipid Profile:   Lab Results   Component Value Date    CHOLESTEROL 235 (H) 09/02/2021     09/02/2021    TRIG 59 09/02/2021    LDLCALC 119 (H) 09/02/2021    [place] 146 12/11/2019     Thyroid Studies:   Lab Results   Component Value Date    ISF0EAHTCSJC 1 370 09/23/2020     Vitamin D Level   Lab Results   Component Value Date    RGGF44GOZJGB 29 9 (L) 09/23/2020     Urinalysis   Lab Results   Component Value Date    COLORU YELLOW 09/02/2021    CLARITYU Clear 12/11/2019    SPECGRAV 1 005 09/02/2021    PHUR 7 0 12/11/2019    LEUKOCYTESUR NEGATIVE 09/02/2021    NITRITE NEGATIVE 09/02/2021    PROTEIN UA NEGATIVE 09/02/2021    GLUCOSEU NEGATIVE 09/02/2021    KETONESU NEGATIVE 09/02/2021    UROBILINOGEN 0 2 12/11/2019    BILIRUBINUR NEGATIVE 09/02/2021    BLOODU NEGATIVE 09/02/2021    RBCUA None Seen 11/11/2015    WBCUA None Seen 11/11/2015    EPIS None Seen 11/11/2015    BACTERIA Occasional 11/11/2015

## 2021-09-30 DIAGNOSIS — J30.9 ALLERGIC RHINITIS, UNSPECIFIED SEASONALITY, UNSPECIFIED TRIGGER: ICD-10-CM

## 2021-09-30 RX ORDER — FLUTICASONE PROPIONATE 50 MCG
1 SPRAY, SUSPENSION (ML) NASAL DAILY
Qty: 18.2 ML | Refills: 2
Start: 2021-09-30 | End: 2021-09-30 | Stop reason: SDUPTHER

## 2022-05-17 ENCOUNTER — TELEPHONE (OUTPATIENT)
Dept: INTERNAL MEDICINE CLINIC | Facility: CLINIC | Age: 62
End: 2022-05-17

## 2022-05-17 NOTE — TELEPHONE ENCOUNTER
Patient would like to know what your recommendation is for the 2nd booster  Patient can be reached at [phone]

## 2022-08-10 ENCOUNTER — TELEPHONE (OUTPATIENT)
Dept: OBGYN CLINIC | Facility: MEDICAL CENTER | Age: 62
End: 2022-08-10

## 2022-09-06 ENCOUNTER — ANNUAL EXAM (OUTPATIENT)
Dept: OBGYN CLINIC | Facility: CLINIC | Age: 62
End: 2022-09-06
Payer: COMMERCIAL

## 2022-09-06 ENCOUNTER — TELEPHONE (OUTPATIENT)
Dept: INTERNAL MEDICINE CLINIC | Facility: CLINIC | Age: 62
End: 2022-09-06

## 2022-09-06 VITALS
DIASTOLIC BLOOD PRESSURE: 82 MMHG | BODY MASS INDEX: 19.63 KG/M2 | HEIGHT: 64 IN | SYSTOLIC BLOOD PRESSURE: 160 MMHG | WEIGHT: 115 LBS

## 2022-09-06 DIAGNOSIS — Z13.0 SCREENING FOR DEFICIENCY ANEMIA: ICD-10-CM

## 2022-09-06 DIAGNOSIS — E55.9 VITAMIN D DEFICIENCY: Primary | ICD-10-CM

## 2022-09-06 DIAGNOSIS — Z13.1 SCREENING FOR DIABETES MELLITUS: ICD-10-CM

## 2022-09-06 DIAGNOSIS — Z13.220 SCREENING FOR HYPERLIPIDEMIA: ICD-10-CM

## 2022-09-06 DIAGNOSIS — Z12.31 ENCOUNTER FOR SCREENING MAMMOGRAM FOR MALIGNANT NEOPLASM OF BREAST: ICD-10-CM

## 2022-09-06 DIAGNOSIS — M85.80 OSTEOPENIA, UNSPECIFIED LOCATION: ICD-10-CM

## 2022-09-06 DIAGNOSIS — Z01.419 ENCOUNTER FOR WELL WOMAN EXAM WITH ROUTINE GYNECOLOGICAL EXAM: Primary | ICD-10-CM

## 2022-09-06 DIAGNOSIS — N89.8 VAGINAL DRYNESS: ICD-10-CM

## 2022-09-06 PROBLEM — D75.1 POLYCYTHEMIA: Status: RESOLVED | Noted: 2019-12-23 | Resolved: 2022-09-06

## 2022-09-06 PROCEDURE — G0143 SCR C/V CYTO,THINLAYER,RESCR: HCPCS | Performed by: OBSTETRICS & GYNECOLOGY

## 2022-09-06 PROCEDURE — S0612 ANNUAL GYNECOLOGICAL EXAMINA: HCPCS | Performed by: OBSTETRICS & GYNECOLOGY

## 2022-09-06 RX ORDER — CONJUGATED ESTROGENS 0.62 MG/G
1 CREAM VAGINAL 2 TIMES WEEKLY
Qty: 90 G | Refills: 3 | Status: SHIPPED | OUTPATIENT
Start: 2022-09-08

## 2022-09-06 NOTE — TELEPHONE ENCOUNTER
Patient has a physical and needs upcoming blood work and goes to [facility] and does not need a call back    Thank you

## 2022-09-06 NOTE — PROGRESS NOTES
[facility] [address] [place] [address]    ASSESSMENT/PLAN: Raghav Altman is a 58 y o  [id number] who presents for annual gynecologic exam   Routine well woman exam completed today  Cervical Cancer Screening: Current ASCCP Guidelines reviewed  Last Pap: 2020  Next Pap Due: every 2 years  Breast cancer screening: done 2019, scheduled  DEXA normal 2020  Colon cancer screening recommended, done 2021    CC: Annual Gynecologic Examination    HPI: Raghav Altman is a 58 y o  [id number] who presents for annual gynecologic examination  No gyn complaints; uses Premarin vaginal cream for vaginal dryness    Gynecologic Exam        The following portions of the patient's history were reviewed and updated as appropriate: allergies, current medications, past family history, past medical history, obstetric history, gynecologic history, past social history, past surgical history and problem list     Review of Systems   Constitutional: Negative  HENT: Negative  Eyes: Negative  Respiratory: Negative  Cardiovascular: Negative  Gastrointestinal: Negative  Genitourinary: Negative  Musculoskeletal: Negative  All other systems reviewed and are negative  Objective:  /82   Ht 5' 4" (1 626 m)   Wt 52 2 kg (115 lb)   BMI 19 74 kg/m²    Physical Exam  Vitals reviewed  Constitutional:       General: She is not in acute distress  Appearance: She is well-developed  HENT:      Head: Normocephalic and atraumatic  Nose: Nose normal    Cardiovascular:      Rate and Rhythm: Normal rate  Pulmonary:      Effort: Pulmonary effort is normal  No respiratory distress  Chest:   Breasts: Breasts are symmetrical       Right: Normal  No mass, nipple discharge, skin change, tenderness, axillary adenopathy or supraclavicular adenopathy  Left: Normal  No mass, nipple discharge, skin change, tenderness, axillary adenopathy or supraclavicular adenopathy  Abdominal:      General: There is no distension  Palpations: Abdomen is soft  There is no mass  Tenderness: There is no abdominal tenderness  There is no guarding or rebound  Genitourinary:     General: Normal vulva  Exam position: Lithotomy position  Labia:         Right: No lesion  Left: No lesion  Urethra: No prolapse (urethral meatus normal)  Vagina: Normal  No vaginal discharge, erythema or bleeding  Cervix: Normal       Uterus: Normal        Adnexa: Right adnexa normal and left adnexa normal       Comments: Pap done   Musculoskeletal:         General: Normal range of motion  Cervical back: Normal range of motion  Lymphadenopathy:      Upper Body:      Right upper body: No supraclavicular, axillary or pectoral adenopathy  Left upper body: No supraclavicular, axillary or pectoral adenopathy  Lower Body: No left inguinal adenopathy  Skin:     General: Skin is warm and dry  Neurological:      Mental Status: She is alert and oriented to person, place, and time  Psychiatric:         Behavior: Behavior normal          Thought Content:  Thought content normal          Judgment: Judgment normal

## 2022-09-08 ENCOUNTER — TELEPHONE (OUTPATIENT)
Dept: OBGYN CLINIC | Facility: MEDICAL CENTER | Age: 62
End: 2022-09-08

## 2022-09-08 DIAGNOSIS — N89.8 VAGINAL DRYNESS: Primary | ICD-10-CM

## 2022-09-08 RX ORDER — ESTRADIOL 0.1 MG/G
1 CREAM VAGINAL 2 TIMES WEEKLY
Qty: 127.5 G | Refills: 3 | Status: SHIPPED | OUTPATIENT
Start: 2022-09-08 | End: 2022-09-20 | Stop reason: SDUPTHER

## 2022-09-12 LAB
LAB AP GYN PRIMARY INTERPRETATION: NORMAL
Lab: NORMAL

## 2022-09-20 ENCOUNTER — TELEPHONE (OUTPATIENT)
Dept: OBGYN CLINIC | Facility: MEDICAL CENTER | Age: 62
End: 2022-09-20

## 2022-09-20 DIAGNOSIS — N89.8 VAGINAL DRYNESS: ICD-10-CM

## 2022-09-20 RX ORDER — ESTRADIOL 0.1 MG/G
1 CREAM VAGINAL 2 TIMES WEEKLY
Qty: 127.5 G | Refills: 3 | Status: SHIPPED | OUTPATIENT
Start: 2022-09-22 | End: 2022-10-07

## 2022-09-20 NOTE — TELEPHONE ENCOUNTER
Pt called again about her medication(Estrace cream) switched over to [facility] [place]  Please review when you get a chance   Thank you

## 2022-09-22 ENCOUNTER — TELEPHONE (OUTPATIENT)
Dept: OBGYN CLINIC | Facility: MEDICAL CENTER | Age: 62
End: 2022-09-22

## 2022-09-22 NOTE — TELEPHONE ENCOUNTER
Pt called into office in regards to medication that was sent to pharmacy  Due to insurance, Pharmacy will require PA for Estradiol medication  Pt stated that if PA not approved that insurance will cover for Vagifem instead  Please review  Thank you!

## 2022-09-29 ENCOUNTER — TELEPHONE (OUTPATIENT)
Dept: OBGYN CLINIC | Facility: MEDICAL CENTER | Age: 62
End: 2022-09-29

## 2022-09-29 NOTE — TELEPHONE ENCOUNTER
Pt called office for information on medication  Called pt back and lvm that a prior authorization was submitted and we are waiting to hear back from the insurance

## 2022-10-03 NOTE — TELEPHONE ENCOUNTER
estradiol (ESTRACE VAGINAL) 0 1 mg/g vaginal cream   Was denied by pt insurance please see reason below  Thanks

## 2022-10-07 ENCOUNTER — APPOINTMENT (OUTPATIENT)
Dept: LAB | Facility: CLINIC | Age: 62
End: 2022-10-07
Payer: COMMERCIAL

## 2022-10-07 ENCOUNTER — TELEPHONE (OUTPATIENT)
Dept: OBGYN CLINIC | Facility: MEDICAL CENTER | Age: 62
End: 2022-10-07

## 2022-10-07 DIAGNOSIS — N89.8 VAGINAL DRYNESS: Primary | ICD-10-CM

## 2022-10-07 LAB
25(OH)D3 SERPL-MCNC: 42.3 NG/ML (ref 30–100)
ALBUMIN SERPL BCP-MCNC: 4.2 G/DL (ref 3.5–5)
ALP SERPL-CCNC: 42 U/L (ref 46–116)
ALT SERPL W P-5'-P-CCNC: 35 U/L (ref 12–78)
ANION GAP SERPL CALCULATED.3IONS-SCNC: 3 MMOL/L (ref 4–13)
AST SERPL W P-5'-P-CCNC: 23 U/L (ref 5–45)
BASOPHILS # BLD AUTO: 0.02 THOUSANDS/ΜL (ref 0–0.1)
BASOPHILS NFR BLD AUTO: 1 % (ref 0–1)
BILIRUB SERPL-MCNC: 0.97 MG/DL (ref 0.2–1)
BILIRUB UR QL STRIP: NEGATIVE
BUN SERPL-MCNC: 19 MG/DL (ref 5–25)
CALCIUM SERPL-MCNC: 9.6 MG/DL (ref 8.3–10.1)
CHLORIDE SERPL-SCNC: 103 MMOL/L (ref 96–108)
CHOLEST SERPL-MCNC: 226 MG/DL
CLARITY UR: CLEAR
CO2 SERPL-SCNC: 29 MMOL/L (ref 21–32)
COLOR UR: COLORLESS
CREAT SERPL-MCNC: 0.82 MG/DL (ref 0.6–1.3)
EOSINOPHIL # BLD AUTO: 0.05 THOUSAND/ΜL (ref 0–0.61)
EOSINOPHIL NFR BLD AUTO: 1 % (ref 0–6)
ERYTHROCYTE [DISTWIDTH] IN BLOOD BY AUTOMATED COUNT: 12.9 % (ref 11.6–15.1)
EST. AVERAGE GLUCOSE BLD GHB EST-MCNC: 100 MG/DL
GFR SERPL CREATININE-BSD FRML MDRD: 76 ML/MIN/1.73SQ M
GLUCOSE P FAST SERPL-MCNC: 115 MG/DL (ref 65–99)
GLUCOSE UR STRIP-MCNC: NEGATIVE MG/DL
HBA1C MFR BLD: 5.1 %
HCT VFR BLD AUTO: 44.5 % (ref 34.8–46.1)
HDLC SERPL-MCNC: 110 MG/DL
HGB BLD-MCNC: 14.5 G/DL (ref 11.5–15.4)
HGB UR QL STRIP.AUTO: NEGATIVE
IMM GRANULOCYTES # BLD AUTO: 0.01 THOUSAND/UL (ref 0–0.2)
IMM GRANULOCYTES NFR BLD AUTO: 0 % (ref 0–2)
KETONES UR STRIP-MCNC: NEGATIVE MG/DL
LDLC SERPL CALC-MCNC: 107 MG/DL (ref 0–100)
LEUKOCYTE ESTERASE UR QL STRIP: NEGATIVE
LYMPHOCYTES # BLD AUTO: 1.32 THOUSANDS/ΜL (ref 0.6–4.47)
LYMPHOCYTES NFR BLD AUTO: 33 % (ref 14–44)
MCH RBC QN AUTO: 32.1 PG (ref 26.8–34.3)
MCHC RBC AUTO-ENTMCNC: 32.6 G/DL (ref 31.4–37.4)
MCV RBC AUTO: 99 FL (ref 82–98)
MONOCYTES # BLD AUTO: 0.3 THOUSAND/ΜL (ref 0.17–1.22)
MONOCYTES NFR BLD AUTO: 8 % (ref 4–12)
NEUTROPHILS # BLD AUTO: 2.25 THOUSANDS/ΜL (ref 1.85–7.62)
NEUTS SEG NFR BLD AUTO: 57 % (ref 43–75)
NITRITE UR QL STRIP: NEGATIVE
NONHDLC SERPL-MCNC: 116 MG/DL
NRBC BLD AUTO-RTO: 0 /100 WBCS
PH UR STRIP.AUTO: 6.5 [PH]
PLATELET # BLD AUTO: 196 THOUSANDS/UL (ref 149–390)
PMV BLD AUTO: 11 FL (ref 8.9–12.7)
POTASSIUM SERPL-SCNC: 4.9 MMOL/L (ref 3.5–5.3)
PROT SERPL-MCNC: 7.3 G/DL (ref 6.4–8.4)
PROT UR STRIP-MCNC: NEGATIVE MG/DL
RBC # BLD AUTO: 4.52 MILLION/UL (ref 3.81–5.12)
SODIUM SERPL-SCNC: 135 MMOL/L (ref 135–147)
SP GR UR STRIP.AUTO: 1 (ref 1–1.03)
TRIGL SERPL-MCNC: 45 MG/DL
TSH SERPL DL<=0.05 MIU/L-ACNC: 1.42 UIU/ML (ref 0.45–4.5)
UROBILINOGEN UR STRIP-ACNC: <2 MG/DL
WBC # BLD AUTO: 3.95 THOUSAND/UL (ref 4.31–10.16)

## 2022-10-07 PROCEDURE — 82306 VITAMIN D 25 HYDROXY: CPT | Performed by: INTERNAL MEDICINE

## 2022-10-07 PROCEDURE — 85025 COMPLETE CBC W/AUTO DIFF WBC: CPT | Performed by: INTERNAL MEDICINE

## 2022-10-07 PROCEDURE — 81003 URINALYSIS AUTO W/O SCOPE: CPT | Performed by: INTERNAL MEDICINE

## 2022-10-07 PROCEDURE — 83036 HEMOGLOBIN GLYCOSYLATED A1C: CPT | Performed by: INTERNAL MEDICINE

## 2022-10-07 PROCEDURE — 36415 COLL VENOUS BLD VENIPUNCTURE: CPT | Performed by: INTERNAL MEDICINE

## 2022-10-07 PROCEDURE — 80061 LIPID PANEL: CPT | Performed by: INTERNAL MEDICINE

## 2022-10-07 PROCEDURE — 80053 COMPREHEN METABOLIC PANEL: CPT | Performed by: INTERNAL MEDICINE

## 2022-10-07 PROCEDURE — 84443 ASSAY THYROID STIM HORMONE: CPT | Performed by: INTERNAL MEDICINE

## 2022-10-07 RX ORDER — ESTRADIOL 10 UG/1
1 INSERT VAGINAL 2 TIMES WEEKLY
Qty: 24 TABLET | Refills: 3 | Status: SHIPPED | OUTPATIENT
Start: 2022-10-10

## 2022-10-07 NOTE — TELEPHONE ENCOUNTER
Pt called in was originally denied for Estrace cream, but got a letter in the mail with alterative options; 3 inserts - estrdiol cream, yuvafem vagina tab, and intrarosa vagina insert   Pt is okay with trying any that is provided  Would like it sent to pharmacy on file [facility] [place]   Please review when available thank you

## 2022-10-11 ENCOUNTER — OFFICE VISIT (OUTPATIENT)
Dept: INTERNAL MEDICINE CLINIC | Facility: CLINIC | Age: 62
End: 2022-10-11
Payer: COMMERCIAL

## 2022-10-11 VITALS
SYSTOLIC BLOOD PRESSURE: 150 MMHG | WEIGHT: 117.2 LBS | OXYGEN SATURATION: 99 % | TEMPERATURE: 98.1 F | DIASTOLIC BLOOD PRESSURE: 90 MMHG | HEART RATE: 77 BPM | HEIGHT: 63 IN | BODY MASS INDEX: 20.77 KG/M2

## 2022-10-11 DIAGNOSIS — Z00.00 ANNUAL PHYSICAL EXAM: ICD-10-CM

## 2022-10-11 DIAGNOSIS — M85.80 OSTEOPENIA, UNSPECIFIED LOCATION: Primary | ICD-10-CM

## 2022-10-11 PROCEDURE — 99396 PREV VISIT EST AGE 40-64: CPT | Performed by: INTERNAL MEDICINE

## 2022-10-11 NOTE — PROGRESS NOTES
[facility] INTERNAL MEDICINE    NAME: Louie Mohs  AGE: 58 y o  SEX: female  : 1960     DATE: 10/11/2022     Assessment and Plan:     Problem List Items Addressed This Visit        Musculoskeletal and Integument    Osteopenia - Primary    Relevant Orders    DXA bone density spine hip and pelvis      Other Visit Diagnoses     Annual physical exam              Immunizations and preventive care screenings were discussed with patient today  Appropriate education was printed on patient's after visit summary  Counseling:  · Dental Health: discussed importance of regular tooth brushing, flossing, and dental visits  Depression Screening and Follow-up Plan: Patient was screened for depression during today's encounter  They screened negative with a PHQ-2 score of 0  No follow-ups on file  Chief Complaint:     Chief Complaint   Patient presents with   • Annual Exam      History of Present Illness:     Adult Annual Physical   Patient here for a comprehensive physical exam  The patient reports no problems  Tarah Rodriguez is a healthy 58 y o taking a good care of her health  She is retired now although would consider restarding volunteering at her previous job at some point  Mentioned some back pain which is occupational injury but is managing with ibuprofen as needed and physical therapy exercises  Denies any other symptoms  Reports being happy and enjoying the life  Has upcoming vacation to [place] with her   She is physically active and enjoys playing golf  Watches the diet and avoids excess of sugars and fat  Was recommended to keep herself hydrated and avoid drinking too much water before bedtime to avoid multiple night time wakenings  She is up to day on all the routine check ups and screenings  The lab work are all in normal range  She is due for DEXA scan and it was ordered     She is due for flu shot and she will be doing in a week  Diet and Physical Activity  · Diet/Nutrition: well balanced diet  · Exercise: 3-4 times a week on average  Depression Screening  PHQ-2/9 Depression Screening    Little interest or pleasure in doing things: 0 - not at all  Feeling down, depressed, or hopeless: 0 - not at all  PHQ-2 Score: 0  PHQ-2 Interpretation: Negative depression screen       General Health  · Sleep: sleeps well and gets 4-6 hours of sleep on average  · Hearing: normal - bilateral   · Vision: no vision problems  · Dental: regular dental visits  /GYN Health  · Patient is: postmenopausal  · Mammogram 10/24/22       Review of Systems:     Review of Systems   Constitutional: Negative for chills and fever  HENT: Negative for ear pain and sore throat  Eyes: Negative for pain and visual disturbance  Respiratory: Negative for cough and shortness of breath  Cardiovascular: Negative for chest pain and palpitations  Gastrointestinal: Negative for abdominal pain and vomiting  Genitourinary: Negative for dysuria and hematuria  Musculoskeletal: Positive for back pain  Negative for arthralgias  Skin: Negative for color change and rash  Neurological: Negative for seizures and syncope  All other systems reviewed and are negative  Past Medical History:     History reviewed  No pertinent past medical history  Past Surgical History:     Past Surgical History:   Procedure Laterality Date   • DILATION AND CURETTAGE OF UTERUS  1987    miscarriage      Social History:     Social History     Socioeconomic History   • Marital status: /Civil Union     Spouse name: None   • Number of children: None   • Years of education: None   • Highest education level: None   Occupational History   • None   Tobacco Use   • Smoking status: Never Smoker   • Smokeless tobacco: Never Used   Vaping Use   • Vaping Use: Never used   Substance and Sexual Activity   • Alcohol use:  Yes   • Drug use: Never   • Sexual activity: Yes     Partners: Male     Birth control/protection: Post-menopausal   Other Topics Concern   • None   Social History Narrative   • None     Social Determinants of Health     Financial Resource Strain: Not on file   Food Insecurity: Not on file   Transportation Needs: Not on file   Physical Activity: Not on file   Stress: Not on file   Social Connections: Not on file   Intimate Partner Violence: Not on file   Housing Stability: Not on file      Family History:     Family History   Problem Relation Age of Onset   • Atrial fibrillation Mother    • Heart disease Mother    • Hypertension Mother    • Melanoma Father 80   • No Known Problems Maternal Grandmother    • No Known Problems Paternal Grandmother    • Lung cancer Paternal Aunt 80   • Heart disease Family    • Hypertension Family       Current Medications:     Current Outpatient Medications   Medication Sig Dispense Refill   • Cholecalciferol (VITAMIN D3) 1000 units CAPS Take 2 capsules by mouth daily      • estradiol (Yuvafem) 10 MCG TABS vaginal tablet Insert 1 tablet (10 mcg total) into the vagina 2 (two) times a week 24 tablet 3   • fluticasone (FLONASE) 50 mcg/act nasal spray 1 spray into each nostril daily 18 2 mL 2   • Multiple Vitamins-Minerals (CENTRUM SILVER 50+WOMEN) TABS Take by mouth     • Premarin vaginal cream Insert 1 g into the vagina 2 (two) times a week (Patient not taking: Reported on 10/11/2022) 90 g 3     No current facility-administered medications for this visit  Allergies:     No Known Allergies   Physical Exam:     /90 (BP Location: Left arm, Patient Position: Sitting)   Pulse 77   Temp 98 1 °F (36 7 °C) (Tympanic)   Ht 5' 3" (1 6 m)   Wt 53 2 kg (117 lb 3 2 oz)   SpO2 99%   BMI 20 76 kg/m²     Physical Exam  Vitals and nursing note reviewed  Constitutional:       General: She is not in acute distress  Appearance: She is well-developed  HENT:      Head: Normocephalic and atraumatic     Eyes: Conjunctiva/sclera: Conjunctivae normal    Cardiovascular:      Rate and Rhythm: Normal rate and regular rhythm  Pulses: Normal pulses  Heart sounds: Normal heart sounds  No murmur heard  Pulmonary:      Effort: Pulmonary effort is normal  No respiratory distress  Breath sounds: Normal breath sounds  Abdominal:      General: Bowel sounds are normal  There is no distension  Palpations: Abdomen is soft  Tenderness: There is no abdominal tenderness  Musculoskeletal:      Cervical back: Neck supple  Right lower leg: No edema  Left lower leg: No edema  Skin:     General: Skin is warm and dry  Neurological:      General: No focal deficit present  Mental Status: She is alert and oriented to person, place, and time  Mental status is at baseline  Psychiatric:         Mood and Affect: Mood normal          Behavior: Behavior normal          Thought Content:  Thought content normal          Judgment: Judgment normal           Lily Ruiz MD  [facility] INTERNAL MEDICINE

## 2022-10-11 NOTE — PATIENT INSTRUCTIONS

## 2022-10-24 ENCOUNTER — HOSPITAL ENCOUNTER (OUTPATIENT)
Dept: MAMMOGRAPHY | Facility: MEDICAL CENTER | Age: 62
Discharge: HOME | End: 2022-10-24
Payer: COMMERCIAL

## 2022-10-24 VITALS — HEIGHT: 63 IN | WEIGHT: 117.28 LBS | BODY MASS INDEX: 20.78 KG/M2

## 2022-10-24 DIAGNOSIS — Z12.31 ENCOUNTER FOR SCREENING MAMMOGRAM FOR MALIGNANT NEOPLASM OF BREAST: ICD-10-CM

## 2022-10-24 PROCEDURE — 77067 SCR MAMMO BI INCL CAD: CPT

## 2022-10-24 PROCEDURE — 77063 BREAST TOMOSYNTHESIS BI: CPT

## 2022-11-18 ENCOUNTER — HOSPITAL ENCOUNTER (OUTPATIENT)
Dept: BONE DENSITY | Facility: MEDICAL CENTER | Age: 62
Discharge: HOME | End: 2022-11-18

## 2022-11-18 DIAGNOSIS — M85.80 OSTEOPENIA, UNSPECIFIED LOCATION: ICD-10-CM

## 2023-10-04 ENCOUNTER — TELEPHONE (OUTPATIENT)
Dept: INTERNAL MEDICINE CLINIC | Facility: CLINIC | Age: 63
End: 2023-10-04

## 2023-10-04 DIAGNOSIS — E55.9 VITAMIN D DEFICIENCY: Primary | ICD-10-CM

## 2023-10-04 DIAGNOSIS — Z13.1 SCREENING FOR DIABETES MELLITUS: ICD-10-CM

## 2023-10-04 DIAGNOSIS — Z13.0 SCREENING FOR DEFICIENCY ANEMIA: ICD-10-CM

## 2023-10-04 DIAGNOSIS — Z13.220 SCREENING FOR HYPERLIPIDEMIA: ICD-10-CM

## 2023-10-12 ENCOUNTER — APPOINTMENT (OUTPATIENT)
Dept: LAB | Facility: CLINIC | Age: 63
End: 2023-10-12
Payer: COMMERCIAL

## 2023-10-12 LAB
25(OH)D3 SERPL-MCNC: 62.8 NG/ML (ref 30–100)
ALBUMIN SERPL BCP-MCNC: 4.5 G/DL (ref 3.5–5)
ALP SERPL-CCNC: 42 U/L (ref 34–104)
ALT SERPL W P-5'-P-CCNC: 24 U/L (ref 7–52)
ANION GAP SERPL CALCULATED.3IONS-SCNC: 7 MMOL/L
AST SERPL W P-5'-P-CCNC: 26 U/L (ref 13–39)
BASOPHILS # BLD AUTO: 0.03 THOUSANDS/ÂΜL (ref 0–0.1)
BASOPHILS NFR BLD AUTO: 1 % (ref 0–1)
BILIRUB SERPL-MCNC: 0.8 MG/DL (ref 0.2–1)
BILIRUB UR QL STRIP: NEGATIVE
BUN SERPL-MCNC: 17 MG/DL (ref 5–25)
CALCIUM SERPL-MCNC: 10.3 MG/DL (ref 8.4–10.2)
CHLORIDE SERPL-SCNC: 101 MMOL/L (ref 96–108)
CHOLEST SERPL-MCNC: 252 MG/DL
CLARITY UR: CLEAR
CO2 SERPL-SCNC: 29 MMOL/L (ref 21–32)
COLOR UR: COLORLESS
CREAT SERPL-MCNC: 0.75 MG/DL (ref 0.6–1.3)
EOSINOPHIL # BLD AUTO: 0.11 THOUSAND/ÂΜL (ref 0–0.61)
EOSINOPHIL NFR BLD AUTO: 3 % (ref 0–6)
ERYTHROCYTE [DISTWIDTH] IN BLOOD BY AUTOMATED COUNT: 12.4 % (ref 11.6–15.1)
EST. AVERAGE GLUCOSE BLD GHB EST-MCNC: 100 MG/DL
GFR SERPL CREATININE-BSD FRML MDRD: 85 ML/MIN/1.73SQ M
GLUCOSE P FAST SERPL-MCNC: 104 MG/DL (ref 65–99)
GLUCOSE UR STRIP-MCNC: NEGATIVE MG/DL
HBA1C MFR BLD: 5.1 %
HCT VFR BLD AUTO: 46.5 % (ref 34.8–46.1)
HDLC SERPL-MCNC: 102 MG/DL
HGB BLD-MCNC: 16 G/DL (ref 11.5–15.4)
HGB UR QL STRIP.AUTO: NEGATIVE
IMM GRANULOCYTES # BLD AUTO: 0 THOUSAND/UL (ref 0–0.2)
IMM GRANULOCYTES NFR BLD AUTO: 0 % (ref 0–2)
KETONES UR STRIP-MCNC: NEGATIVE MG/DL
LDLC SERPL CALC-MCNC: 137 MG/DL (ref 0–100)
LEUKOCYTE ESTERASE UR QL STRIP: NEGATIVE
LYMPHOCYTES # BLD AUTO: 1.48 THOUSANDS/ÂΜL (ref 0.6–4.47)
LYMPHOCYTES NFR BLD AUTO: 39 % (ref 14–44)
MCH RBC QN AUTO: 33.7 PG (ref 26.8–34.3)
MCHC RBC AUTO-ENTMCNC: 34.4 G/DL (ref 31.4–37.4)
MCV RBC AUTO: 98 FL (ref 82–98)
MONOCYTES # BLD AUTO: 0.38 THOUSAND/ÂΜL (ref 0.17–1.22)
MONOCYTES NFR BLD AUTO: 10 % (ref 4–12)
NEUTROPHILS # BLD AUTO: 1.81 THOUSANDS/ÂΜL (ref 1.85–7.62)
NEUTS SEG NFR BLD AUTO: 47 % (ref 43–75)
NITRITE UR QL STRIP: NEGATIVE
NONHDLC SERPL-MCNC: 150 MG/DL
NRBC BLD AUTO-RTO: 0 /100 WBCS
PH UR STRIP.AUTO: 7 [PH]
PLATELET # BLD AUTO: 220 THOUSANDS/UL (ref 149–390)
PMV BLD AUTO: 11.3 FL (ref 8.9–12.7)
POTASSIUM SERPL-SCNC: 5.6 MMOL/L (ref 3.5–5.3)
PROT SERPL-MCNC: 7.3 G/DL (ref 6.4–8.4)
PROT UR STRIP-MCNC: NEGATIVE MG/DL
RBC # BLD AUTO: 4.75 MILLION/UL (ref 3.81–5.12)
SODIUM SERPL-SCNC: 137 MMOL/L (ref 135–147)
SP GR UR STRIP.AUTO: 1.01 (ref 1–1.03)
TRIGL SERPL-MCNC: 67 MG/DL
UROBILINOGEN UR STRIP-ACNC: <2 MG/DL
WBC # BLD AUTO: 3.81 THOUSAND/UL (ref 4.31–10.16)

## 2023-10-12 PROCEDURE — 85025 COMPLETE CBC W/AUTO DIFF WBC: CPT | Performed by: INTERNAL MEDICINE

## 2023-10-12 PROCEDURE — 82306 VITAMIN D 25 HYDROXY: CPT | Performed by: INTERNAL MEDICINE

## 2023-10-12 PROCEDURE — 81003 URINALYSIS AUTO W/O SCOPE: CPT | Performed by: INTERNAL MEDICINE

## 2023-10-12 PROCEDURE — 36415 COLL VENOUS BLD VENIPUNCTURE: CPT | Performed by: INTERNAL MEDICINE

## 2023-10-12 PROCEDURE — 80061 LIPID PANEL: CPT | Performed by: INTERNAL MEDICINE

## 2023-10-12 PROCEDURE — 80053 COMPREHEN METABOLIC PANEL: CPT | Performed by: INTERNAL MEDICINE

## 2023-10-12 PROCEDURE — 83036 HEMOGLOBIN GLYCOSYLATED A1C: CPT | Performed by: INTERNAL MEDICINE

## 2023-10-16 ENCOUNTER — OFFICE VISIT (OUTPATIENT)
Dept: INTERNAL MEDICINE CLINIC | Facility: CLINIC | Age: 63
End: 2023-10-16
Payer: COMMERCIAL

## 2023-10-16 VITALS
BODY MASS INDEX: 20.91 KG/M2 | DIASTOLIC BLOOD PRESSURE: 68 MMHG | WEIGHT: 118 LBS | OXYGEN SATURATION: 98 % | HEART RATE: 70 BPM | TEMPERATURE: 97.7 F | SYSTOLIC BLOOD PRESSURE: 122 MMHG | HEIGHT: 63 IN | RESPIRATION RATE: 16 BRPM

## 2023-10-16 DIAGNOSIS — Z00.00 ANNUAL PHYSICAL EXAM: ICD-10-CM

## 2023-10-16 DIAGNOSIS — R79.9 ABNORMAL BLOOD CHEMISTRY: Primary | ICD-10-CM

## 2023-10-16 DIAGNOSIS — R79.89 ABNORMAL CBC: ICD-10-CM

## 2023-10-16 DIAGNOSIS — Z23 ENCOUNTER FOR IMMUNIZATION: ICD-10-CM

## 2023-10-16 PROCEDURE — 90471 IMMUNIZATION ADMIN: CPT | Performed by: INTERNAL MEDICINE

## 2023-10-16 PROCEDURE — 90686 IIV4 VACC NO PRSV 0.5 ML IM: CPT | Performed by: INTERNAL MEDICINE

## 2023-10-16 PROCEDURE — 99396 PREV VISIT EST AGE 40-64: CPT | Performed by: INTERNAL MEDICINE

## 2023-10-16 NOTE — PATIENT INSTRUCTIONS
Wellness Visit for Adults   AMBULATORY CARE:   A wellness visit  is when you see your healthcare provider to get screened for health problems. Your healthcare provider will also give you advice on how to stay healthy. Write down your questions so you remember to ask them. Ask your healthcare provider how often you should have a wellness visit. What happens at a wellness visit:  Your healthcare provider will ask about your health, and your family history of health problems. This includes high blood pressure, heart disease, and cancer. He or she will ask if you have symptoms that concern you, if you smoke, and about your mood. You may also be asked about your intake of medicines, supplements, food, and alcohol. Any of the following may be done: Your weight  will be checked. Your height may also be checked so your body mass index (BMI) can be calculated. Your BMI shows if you are at a healthy weight. Your blood pressure  and heart rate will be checked. Your temperature may also be checked. Blood and urine tests  may be done. Blood tests may be done to check your cholesterol levels. Abnormal cholesterol levels increase your risk for heart disease and stroke. You may also need a blood or urine test to check for diabetes if you are at increased risk. Urine tests may be done to look for signs of an infection or kidney disease. A physical exam  includes checking your heartbeat and lungs with a stethoscope. Your healthcare provider may also check your skin to look for sun damage. Screening tests  may be recommended. A screening test is done to check for diseases that may not cause symptoms. The screening tests you may need depend on your age, gender, family history, and lifestyle habits. For example, colorectal screening may be recommended if you are 48years old or older. Screening tests you need if you are a woman:   A Pap smear  is used to screen for cervical cancer.  Pap smears are usually done every 3 to 5 years depending on your age. You may need them more often if you have had abnormal Pap smear test results in the past. Ask your healthcare provider how often you should have a Pap smear. A mammogram  is an x-ray of your breasts to screen for breast cancer. Experts recommend mammograms every 2 years starting at age 48 years. You may need a mammogram at age 52 years or younger if you have an increased risk for breast cancer. Talk to your healthcare provider about when you should start having mammograms and how often you need them. Vaccines you may need:   Get an influenza vaccine  every year. The influenza vaccine protects you from the flu. Several types of viruses cause the flu. The viruses change over time, so new vaccines are made each year. Get a tetanus-diphtheria (Td) booster vaccine  every 10 years. This vaccine protects you against tetanus and diphtheria. Tetanus is a severe infection that may cause painful muscle spasms and lockjaw. Diphtheria is a severe bacterial infection that causes a thick covering in the back of your mouth and throat. Get a human papillomavirus (HPV) vaccine  if you are female and aged 23 to 32 or male 23 to 24 and never received it. This vaccine protects you from HPV infection. HPV is the most common infection spread by sexual contact. HPV may also cause vaginal, penile, and anal cancers. Get a pneumococcal vaccine  if you are aged 72 years or older. The pneumococcal vaccine is an injection given to protect you from pneumococcal disease. Pneumococcal disease is an infection caused by pneumococcal bacteria. The infection may cause pneumonia, meningitis, or an ear infection. Get a shingles vaccine  if you are 60 or older, even if you have had shingles before. The shingles vaccine is an injection to protect you from the varicella-zoster virus. This is the same virus that causes chickenpox.  Shingles is a painful rash that develops in people who had chickenpox or have been exposed to the virus. How to eat healthy:  My Plate is a model for planning healthy meals. It shows the types and amounts of foods that should go on your plate. Fruits and vegetables make up about half of your plate, and grains and protein make up the other half. A serving of dairy is included on the side of your plate. The amount of calories and serving sizes you need depends on your age, gender, weight, and height. Examples of healthy foods are listed below:  Eat a variety of vegetables  such as dark green, red, and orange vegetables. You can also include canned vegetables low in sodium (salt) and frozen vegetables without added butter or sauces. Eat a variety of fresh fruits , canned fruit in 100% juice, frozen fruit, and dried fruit. Include whole grains. At least half of the grains you eat should be whole grains. Examples include whole-wheat bread, wheat pasta, brown rice, and whole-grain cereals such as oatmeal.    Eat a variety of protein foods such as seafood (fish and shellfish), lean meat, and poultry without skin (turkey and chicken). Examples of lean meats include pork leg, shoulder, or tenderloin, and beef round, sirloin, tenderloin, and extra lean ground beef. Other protein foods include eggs and egg substitutes, beans, peas, soy products, nuts, and seeds. Choose low-fat dairy products such as skim or 1% milk or low-fat yogurt, cheese, and cottage cheese. Limit unhealthy fats  such as butter, hard margarine, and shortening. Exercise:  Exercise at least 30 minutes per day on most days of the week. Some examples of exercise include walking, biking, dancing, and swimming. You can also fit in more physical activity by taking the stairs instead of the elevator or parking farther away from stores. Include muscle strengthening activities 2 days each week. Regular exercise provides many health benefits.  It helps you manage your weight, and decreases your risk for type 2 diabetes, heart disease, stroke, and high blood pressure. Exercise can also help improve your mood. Ask your healthcare provider about the best exercise plan for you. General health and safety guidelines:   Do not smoke. Nicotine and other chemicals in cigarettes and cigars can cause lung damage. Ask your healthcare provider for information if you currently smoke and need help to quit. E-cigarettes or smokeless tobacco still contain nicotine. Talk to your healthcare provider before you use these products. Limit alcohol. A drink of alcohol is 12 ounces of beer, 5 ounces of wine, or 1½ ounces of liquor. Lose weight, if needed. Being overweight increases your risk of certain health conditions. These include heart disease, high blood pressure, type 2 diabetes, and certain types of cancer. Protect your skin. Do not sunbathe or use tanning beds. Use sunscreen with a SPF 15 or higher. Apply sunscreen at least 15 minutes before you go outside. Reapply sunscreen every 2 hours. Wear protective clothing, hats, and sunglasses when you are outside. Drive safely. Always wear your seatbelt. Make sure everyone in your car wears a seatbelt. A seatbelt can save your life if you are in an accident. Do not use your cell phone when you are driving. This could distract you and cause an accident. Pull over if you need to make a call or send a text message. Practice safe sex. Use latex condoms if are sexually active and have more than one partner. Your healthcare provider may recommend screening tests for sexually transmitted infections (STIs). Wear helmets, lifejackets, and protective gear. Always wear a helmet when you ride a bike or motorcycle, go skiing, or play sports that could cause a head injury. Wear protective equipment when you play sports. Wear a lifejacket when you are on a boat or doing water sports.     © Copyright [facility] 2023 Information is for End User's use only and may not be sold, redistributed or otherwise used for commercial purposes. The above information is an  only. It is not intended as medical advice for individual conditions or treatments. Talk to your doctor, nurse or pharmacist before following any medical regimen to see if it is safe and effective for you.

## 2023-10-16 NOTE — PROGRESS NOTES
[address] [facility] INTERNAL MEDICINE    NAME: Jean Roman  AGE: 61 y.o. SEX: female  : 1960     DATE: 10/16/2023     Assessment and Plan:     Problem List Items Addressed This Visit    None  Visit Diagnoses       Abnormal blood chemistry    -  Primary    Relevant Orders    Basic metabolic panel    Abnormal CBC        Relevant Orders    CBC and differential    Encounter for immunization        Relevant Orders    influenza vaccine, quadrivalent, 0.5 mL, preservative-free, for adult and pediatric patients 6 mos+ ([facility]ELTON Nguyen, [address])    Annual physical exam                Immunizations and preventive care screenings were discussed with patient today. Appropriate education was printed on patient's after visit summary. Counseling:  See below         1 year     Chief Complaint:     Chief Complaint   Patient presents with    Physical Exam      History of Present Illness:     Adult Annual Physical   Patient here for a comprehensive physical exam. The patient reports no problems. Noble Rolle and her  are enjoying their halfway together. They will be spending the winter in [place] as is her usual custom. This allows Noble Rolle to stay very active. Adair's exercise routine is quite vigorous and she is excellent exercise tolerance. Twice a year she will have back pain and this pattern has filed this year as well. She saw orthopedics recently who felt that this was mechanical in nature and plan is symptomatic treatment including rest as needed. Osmar Hair is now a grandmother. Her grandchild is 3 months old. Preventive care is up-to-date and was reviewed today. DEXA scan is due in 1 year for recheck of osteopenia. Mammogram was ordered in August and Noble Rolle is going to proceed with mammogram.  Flu shot was given today. Colonoscopy is up-to-date. The labs of  were reviewed.   I feel that abnormalities on CMP and CBC may be partially due to the blood tube sitting. Plan is to repeat them and further advise. Diet and Physical Activity  Diet/Nutrition: well balanced diet. Exercise: vigorous cardiovascular exercise. Depression Screening  PHQ-2/9 Depression Screening    Little interest or pleasure in doing things: 0 - not at all  Feeling down, depressed, or hopeless: 0 - not at all  PHQ-2 Score: 0  PHQ-2 Interpretation: Negative depression screen       General Health  Sleep: sleeps well. Hearing: normal - bilateral.  Vision: goes for regular eye exams. Dental: regular dental visits. /GYN Health  Patient is: postmenopausal      Advanced Care Planning  Do you have an advanced directive? no  Do you have a durable medical power of ? no     Review of Systems:     Review of Systems   Constitutional: Negative. HENT: Negative. Eyes: Negative. Respiratory: Negative. Cardiovascular: Negative. Endocrine: Negative. Genitourinary: Negative. Musculoskeletal: Negative. Skin: Negative. Allergic/Immunologic: Negative. Neurological: Negative. Hematological: Negative. Psychiatric/Behavioral: Negative. Past Medical History:     History reviewed. No pertinent past medical history.    Past Surgical History:     Past Surgical History:   Procedure Laterality Date    DILATION AND CURETTAGE OF UTERUS  1987    miscarriage      Social History:     Social History     Socioeconomic History    Marital status: /Civil Union     Spouse name: None    Number of children: None    Years of education: None    Highest education level: None   Occupational History    None   Tobacco Use    Smoking status: Never    Smokeless tobacco: Never   Vaping Use    Vaping Use: Never used   Substance and Sexual Activity    Alcohol use: Yes    Drug use: Never    Sexual activity: Yes     Partners: Male     Birth control/protection: Post-menopausal   Other Topics Concern    None   Social History Narrative None     Social Determinants of Health     Financial Resource Strain: Not on file   Food Insecurity: Not on file   Transportation Needs: Not on file   Physical Activity: Not on file   Stress: Not on file   Social Connections: Not on file   Intimate Partner Violence: Not on file   Housing Stability: Not on file      Family History:     Family History   Problem Relation Age of Onset    Atrial fibrillation Mother     Heart disease Mother     Hypertension Mother     Melanoma Father 80    No Known Problems Maternal Grandmother     No Known Problems Paternal Grandmother     Lung cancer Paternal Aunt 80    Heart disease Family     Hypertension Family       Current Medications:     Current Outpatient Medications   Medication Sig Dispense Refill    Cholecalciferol (VITAMIN D3) 1000 units CAPS Take 2 capsules by mouth daily       estradiol (Yuvafem) 10 MCG TABS vaginal tablet Insert 1 tablet (10 mcg total) into the vagina 2 (two) times a week 24 tablet 3    fluticasone (FLONASE) 50 mcg/act nasal spray 1 spray into each nostril daily 18.2 mL 2    Multiple Vitamins-Minerals (CENTRUM SILVER 50+WOMEN) TABS Take by mouth       No current facility-administered medications for this visit. Allergies:     No Known Allergies   Physical Exam:     /68 (BP Location: Left arm, Patient Position: Sitting, Cuff Size: Standard)   Pulse 70   Temp 97.7 °F (36.5 °C) (Tympanic Core)   Resp 16   Ht 5' 3" (1.6 m)   Wt 53.5 kg (118 lb)   SpO2 98%   BMI 20.90 kg/m²     Physical Exam   VSS, afebrile, pulse regular    Alert and Oriented in NAD    HEENT: NCAT, Pupils equal, 3 mm, EOEMI, no icterus or pallor, no iritis or conjunctivitis. No head or neck mass or adenopathy.      Ears:  normal-appearing external auditory canals and tympanic membranes,     Nose: patent nasal passages without polyps or masses, no septal deviation, no nasal deformity,     Oral cavity and throat: normal dentition, no gum disease, normal mucosa, patent airway, no hemorrhages or exudates in the throat. Exam of salivary glands and ducts are normal. No submandibular or submental adenopathy    Neck: supple, no trauma or tenderness. No JVD. Normal carotid upstroke and descent without bruits. Trachea midline without stridor. Thyroid exam normal on inspection and palpation. No spinal tenderness, full range of motion. Lymphatics: no adenopathy throughout    Chest:  No trauma or deformity, no supraclavicular adenopathy or bruit. Chest wall expansion is symmetric and normal, expiratory phase is not prolonged. Heart:  Regular rate and rhythm, normal C3-P4, no V5-J2, no clicks murmurs or rubs. Lungs:  Clear to auscultation and normal to percussion. Back:  No trauma or deformity, no CVA mass or CVA tenderness. Skin:  No rash or skin malignancy. Abdomen:  Nondistended, normal bowel sounds, soft nontender without masses bruits organomegaly. There is no hernia. Extremities:  Arterial circulation is symmetrically normal with no clubbing cyanosis or edema. There are no varicosities, there is no calf tenderness or phlebitic findings. Joints:  No deformity, swelling, redness, tenderness or increased temperature, no instability. There are no tophi. Affect:  Normal    Neurologic:  Normal and stable gait, and otherwise no focal findings as well. Cognitive: Intact.        Jyoti Baez MD  [id number] INTERNAL MEDICINE

## 2023-10-24 ENCOUNTER — APPOINTMENT (OUTPATIENT)
Dept: LAB | Facility: HOSPITAL | Age: 63
End: 2023-10-24
Payer: COMMERCIAL

## 2023-10-24 DIAGNOSIS — E83.52 HYPERCALCEMIA: ICD-10-CM

## 2023-10-24 DIAGNOSIS — D75.1 POLYCYTHEMIA: Primary | ICD-10-CM

## 2023-10-24 LAB
ANION GAP SERPL CALCULATED.3IONS-SCNC: 5 MMOL/L
BASOPHILS # BLD AUTO: 0.04 THOUSANDS/ÂΜL (ref 0–0.1)
BASOPHILS NFR BLD AUTO: 1 % (ref 0–1)
BUN SERPL-MCNC: 16 MG/DL (ref 5–25)
CALCIUM SERPL-MCNC: 10.3 MG/DL (ref 8.4–10.2)
CHLORIDE SERPL-SCNC: 103 MMOL/L (ref 96–108)
CO2 SERPL-SCNC: 30 MMOL/L (ref 21–32)
CREAT SERPL-MCNC: 0.81 MG/DL (ref 0.6–1.3)
EOSINOPHIL # BLD AUTO: 0.06 THOUSAND/ÂΜL (ref 0–0.61)
EOSINOPHIL NFR BLD AUTO: 1 % (ref 0–6)
ERYTHROCYTE [DISTWIDTH] IN BLOOD BY AUTOMATED COUNT: 12.3 % (ref 11.6–15.1)
GFR SERPL CREATININE-BSD FRML MDRD: 77 ML/MIN/1.73SQ M
GLUCOSE SERPL-MCNC: 114 MG/DL (ref 65–140)
HCT VFR BLD AUTO: 49 % (ref 34.8–46.1)
HGB BLD-MCNC: 15.9 G/DL (ref 11.5–15.4)
IMM GRANULOCYTES # BLD AUTO: 0.01 THOUSAND/UL (ref 0–0.2)
IMM GRANULOCYTES NFR BLD AUTO: 0 % (ref 0–2)
LYMPHOCYTES # BLD AUTO: 1.82 THOUSANDS/ÂΜL (ref 0.6–4.47)
LYMPHOCYTES NFR BLD AUTO: 37 % (ref 14–44)
MCH RBC QN AUTO: 32.1 PG (ref 26.8–34.3)
MCHC RBC AUTO-ENTMCNC: 32.4 G/DL (ref 31.4–37.4)
MCV RBC AUTO: 99 FL (ref 82–98)
MONOCYTES # BLD AUTO: 0.35 THOUSAND/ÂΜL (ref 0.17–1.22)
MONOCYTES NFR BLD AUTO: 7 % (ref 4–12)
NEUTROPHILS # BLD AUTO: 2.65 THOUSANDS/ÂΜL (ref 1.85–7.62)
NEUTS SEG NFR BLD AUTO: 54 % (ref 43–75)
NRBC BLD AUTO-RTO: 0 /100 WBCS
PLATELET # BLD AUTO: 233 THOUSANDS/UL (ref 149–390)
PMV BLD AUTO: 9.8 FL (ref 8.9–12.7)
POTASSIUM SERPL-SCNC: 4.8 MMOL/L (ref 3.5–5.3)
RBC # BLD AUTO: 4.96 MILLION/UL (ref 3.81–5.12)
SODIUM SERPL-SCNC: 138 MMOL/L (ref 135–147)
WBC # BLD AUTO: 4.93 THOUSAND/UL (ref 4.31–10.16)

## 2023-10-24 NOTE — RESULT ENCOUNTER NOTE
Mario Laurent,  Your repeat labs show that you do have a persistent mild elevation of your Calcium and your red cell indices. The next step is more labs: I entered an order for a PTH to screen you for hyperparathyroidism. I also ordered an Erythropoetin level to assess for whether there is a primary overproduction of red cells. Neither test requires you to fast, so please stop by a [facility]'s lab at your convenience. I will contact you with the test results.      Greg Santana

## 2023-10-25 ENCOUNTER — APPOINTMENT (OUTPATIENT)
Dept: LAB | Facility: CLINIC | Age: 63
End: 2023-10-25
Payer: COMMERCIAL

## 2023-10-25 DIAGNOSIS — E83.52 HYPERCALCEMIA: ICD-10-CM

## 2023-10-25 DIAGNOSIS — D75.1 POLYCYTHEMIA: ICD-10-CM

## 2023-10-25 LAB — PTH-INTACT SERPL-MCNC: 61.4 PG/ML (ref 12–88)

## 2023-10-25 PROCEDURE — 36415 COLL VENOUS BLD VENIPUNCTURE: CPT

## 2023-10-25 PROCEDURE — 82668 ASSAY OF ERYTHROPOIETIN: CPT

## 2023-10-25 PROCEDURE — 83970 ASSAY OF PARATHORMONE: CPT

## 2023-10-26 DIAGNOSIS — E83.52 HYPERCALCEMIA: ICD-10-CM

## 2023-10-26 DIAGNOSIS — D75.1 POLYCYTHEMIA: Primary | ICD-10-CM

## 2023-10-26 LAB — EPO SERPL-ACNC: 8.8 MIU/ML (ref 2.6–18.5)

## 2023-10-27 ENCOUNTER — HOSPITAL ENCOUNTER (OUTPATIENT)
Dept: MAMMOGRAPHY | Facility: MEDICAL CENTER | Age: 63
Discharge: HOME | End: 2023-10-27
Payer: COMMERCIAL

## 2023-10-27 VITALS — HEIGHT: 63 IN | BODY MASS INDEX: 20.91 KG/M2 | WEIGHT: 118 LBS

## 2023-10-27 DIAGNOSIS — Z12.31 VISIT FOR SCREENING MAMMOGRAM: ICD-10-CM

## 2023-10-27 PROCEDURE — 77063 BREAST TOMOSYNTHESIS BI: CPT

## 2023-10-27 PROCEDURE — 77067 SCR MAMMO BI INCL CAD: CPT

## 2023-11-02 ENCOUNTER — LAB (OUTPATIENT)
Dept: LAB | Facility: CLINIC | Age: 63
End: 2023-11-02
Payer: COMMERCIAL

## 2023-11-02 DIAGNOSIS — E87.5 HYPERKALEMIA: Primary | ICD-10-CM

## 2023-11-02 DIAGNOSIS — E83.52 HYPERCALCEMIA: ICD-10-CM

## 2023-11-02 LAB
ANION GAP SERPL CALCULATED.3IONS-SCNC: 6 MMOL/L
BUN SERPL-MCNC: 19 MG/DL (ref 5–25)
CALCIUM SERPL-MCNC: 10 MG/DL (ref 8.4–10.2)
CHLORIDE SERPL-SCNC: 102 MMOL/L (ref 96–108)
CO2 SERPL-SCNC: 29 MMOL/L (ref 21–32)
CREAT SERPL-MCNC: 0.76 MG/DL (ref 0.6–1.3)
GFR SERPL CREATININE-BSD FRML MDRD: 83 ML/MIN/1.73SQ M
GLUCOSE P FAST SERPL-MCNC: 114 MG/DL (ref 65–99)
POTASSIUM SERPL-SCNC: 6.2 MMOL/L (ref 3.5–5.3)
SODIUM SERPL-SCNC: 137 MMOL/L (ref 135–147)

## 2023-11-02 PROCEDURE — 80048 BASIC METABOLIC PNL TOTAL CA: CPT

## 2023-11-02 PROCEDURE — 36415 COLL VENOUS BLD VENIPUNCTURE: CPT

## 2023-11-02 NOTE — RESULT ENCOUNTER NOTE
Please call Jasson Moser. Her Calcium level is normal,  her potassium level is now high again, as it was 3 weeks ago and her kidney function is normal. The most likely reason is excessive potassium in her diet or possibly in supplements. Jasson Moser should stop all Vitamins and supplements. She should avoid high potassium foods such as potatoes, bananas, citrus fruits and juices, tree nuts and soybean containing foods. I am ordering a repeat BMP for this coming Monday, November 5.

## 2023-11-14 ENCOUNTER — APPOINTMENT (OUTPATIENT)
Dept: LAB | Facility: CLINIC | Age: 63
End: 2023-11-14
Payer: COMMERCIAL

## 2023-11-14 LAB
ANION GAP SERPL CALCULATED.3IONS-SCNC: 7 MMOL/L
BUN SERPL-MCNC: 17 MG/DL (ref 5–25)
CALCIUM SERPL-MCNC: 10.3 MG/DL (ref 8.4–10.2)
CHLORIDE SERPL-SCNC: 101 MMOL/L (ref 96–108)
CO2 SERPL-SCNC: 29 MMOL/L (ref 21–32)
CREAT SERPL-MCNC: 0.68 MG/DL (ref 0.6–1.3)
GFR SERPL CREATININE-BSD FRML MDRD: 93 ML/MIN/1.73SQ M
GLUCOSE P FAST SERPL-MCNC: 105 MG/DL (ref 65–99)
POTASSIUM SERPL-SCNC: 4.6 MMOL/L (ref 3.5–5.3)
SODIUM SERPL-SCNC: 137 MMOL/L (ref 135–147)

## 2023-11-17 ENCOUNTER — OFFICE VISIT (OUTPATIENT)
Dept: INTERNAL MEDICINE CLINIC | Facility: CLINIC | Age: 63
End: 2023-11-17
Payer: COMMERCIAL

## 2023-11-17 VITALS
HEART RATE: 51 BPM | DIASTOLIC BLOOD PRESSURE: 88 MMHG | SYSTOLIC BLOOD PRESSURE: 138 MMHG | BODY MASS INDEX: 21.09 KG/M2 | OXYGEN SATURATION: 99 % | HEIGHT: 63 IN | WEIGHT: 119 LBS | TEMPERATURE: 97.6 F

## 2023-11-17 DIAGNOSIS — D75.1 POLYCYTHEMIA: ICD-10-CM

## 2023-11-17 DIAGNOSIS — E83.52 HYPERCALCEMIA: Primary | ICD-10-CM

## 2023-11-17 PROCEDURE — 99213 OFFICE O/P EST LOW 20 MIN: CPT | Performed by: INTERNAL MEDICINE

## 2023-11-17 NOTE — PROGRESS NOTES
INTERNAL MEDICINE OFFICE VISIT   [facility] INTERNAL MEDICINE       Assessment/Plan:    1. Hypercalcemia        2. Polycythemia             Hypercalcemia  Lab Results   Component Value Date    CALCIUM 10.3 (H) 11/14/2023    CALCIUM 10.0 11/02/2023    CALCIUM 10.3 (H) 10/24/2023     Advised to dc vitamin D and gas x  Continue taking centrum      Polycythemia  Lab Results   Component Value Date    HGB 15.9 (H) 10/24/2023    HGB 16.0 (H) 10/12/2023    HGB 14.5 10/07/2022    HGB 14.7 09/02/2021    HGB 14.8 12/09/2020     Advised sleep study. Patient will get one in may  Non smoker. Vitamin D deficiency         Subjective:      Patient ID: Tyrone Bertrand is a 61 y.o. female. Coming in for followup. She has had elevated calcium levels the past few visits. Also was noted to have elevated potassium. On a closer look, she was taking centrum which contains 1000 IU of vitamin D, 80 mg potassium, and calcium 300 mg. She was also taking vitamin D 3 tablet with 2000IU and gas x 750 mg of calcium daily. On the very last bmp in 11/14, the patient continued to take vitamin D. It was explained to her that this was why her calcium was high since vitamin d can also raise calcium. She was advised to dc the D3 tablet and the gas x tablet. Continue taking centrum. Otherwise no other subjective complaints. History reviewed. No pertinent past medical history.      Family History   Problem Relation Age of Onset    Atrial fibrillation Mother     Heart disease Mother     Hypertension Mother     Melanoma Father 80    No Known Problems Maternal Grandmother     No Known Problems Paternal Grandmother     Lung cancer Paternal Aunt 80    Heart disease Family     Hypertension Family         Social History     Socioeconomic History    Marital status: /Civil Union     Spouse name: Not on file    Number of children: Not on file    Years of education: Not on file    Highest education level: Not on file   Occupational History Not on file   Tobacco Use    Smoking status: Never    Smokeless tobacco: Never   Vaping Use    Vaping Use: Never used   Substance and Sexual Activity    Alcohol use: Yes    Drug use: Never    Sexual activity: Yes     Partners: Male     Birth control/protection: Post-menopausal   Other Topics Concern    Not on file   Social History Narrative    Not on file     Social Determinants of Health     Financial Resource Strain: Not on file   Food Insecurity: Not on file   Transportation Needs: Not on file   Physical Activity: Not on file   Stress: Not on file   Social Connections: Not on file   Intimate Partner Violence: Not on file   Housing Stability: Not on file        No Known Allergies     Current Outpatient Medications   Medication Sig Dispense Refill    estradiol (Yuvafem) 10 MCG TABS vaginal tablet Insert 1 tablet (10 mcg total) into the vagina 2 (two) times a week 24 tablet 3    fluticasone (FLONASE) 50 mcg/act nasal spray 1 spray into each nostril daily (Patient not taking: Reported on 11/17/2023) 18.2 mL 2    Multiple Vitamins-Minerals (CENTRUM SILVER 50+WOMEN) TABS Take by mouth (Patient not taking: Reported on 11/17/2023)       No current facility-administered medications for this visit. Review of Systems   Constitutional:  Negative for chills and fever. HENT:  Negative for ear pain and sore throat. Eyes:  Negative for pain and visual disturbance. Respiratory:  Negative for cough and shortness of breath. Cardiovascular:  Negative for chest pain and palpitations. Gastrointestinal:  Negative for abdominal pain and vomiting. Genitourinary:  Negative for dysuria and hematuria. Musculoskeletal:  Negative for arthralgias and back pain. Skin:  Negative for color change and rash. Neurological:  Negative for seizures and syncope. All other systems reviewed and are negative.         Objective:      /88   Pulse (!) 51   Temp 97.6 °F (36.4 °C)   Ht 5' 3" (1.6 m)   Wt 54 kg (119 lb) SpO2 99%   BMI 21.08 kg/m²      Wt Readings from Last 3 Encounters:   11/17/23 54 kg (119 lb)   10/27/23 53.5 kg (118 lb)   10/16/23 53.5 kg (118 lb)     Temp Readings from Last 3 Encounters:   11/17/23 97.6 °F (36.4 °C)   10/16/23 97.7 °F (36.5 °C) (Tympanic Core)   10/11/22 98.1 °F (36.7 °C) (Tympanic)     BP Readings from Last 3 Encounters:   11/17/23 138/88   10/16/23 122/68   10/11/22 150/90     Pulse Readings from Last 3 Encounters:   11/17/23 (!) 51   10/16/23 70   10/11/22 77         Physical Exam  Vitals and nursing note reviewed. Constitutional:       General: She is not in acute distress. Appearance: She is well-developed. HENT:      Head: Normocephalic and atraumatic. Nose: Nose normal.      Mouth/Throat:      Mouth: Mucous membranes are moist.   Eyes:      Conjunctiva/sclera: Conjunctivae normal.   Cardiovascular:      Rate and Rhythm: Normal rate and regular rhythm. Heart sounds: No murmur heard. Pulmonary:      Effort: Pulmonary effort is normal. No respiratory distress. Breath sounds: Normal breath sounds. Abdominal:      Palpations: Abdomen is soft. Tenderness: There is no abdominal tenderness. Musculoskeletal:      Cervical back: Neck supple. Right lower leg: No edema. Left lower leg: No edema. Skin:     General: Skin is warm and dry. Capillary Refill: Capillary refill takes less than 2 seconds. Neurological:      General: No focal deficit present. Mental Status: She is alert and oriented to person, place, and time.    Psychiatric:         Mood and Affect: Mood normal.

## 2023-11-17 NOTE — ASSESSMENT & PLAN NOTE
Lab Results   Component Value Date    CALCIUM 10.3 (H) 11/14/2023    CALCIUM 10.0 11/02/2023    CALCIUM 10.3 (H) 10/24/2023     Advised to dc vitamin D and gas x  Continue taking centrum

## 2023-11-17 NOTE — ASSESSMENT & PLAN NOTE
Lab Results   Component Value Date    HGB 15.9 (H) 10/24/2023    HGB 16.0 (H) 10/12/2023    HGB 14.5 10/07/2022    HGB 14.7 09/02/2021    HGB 14.8 12/09/2020     Advised sleep study. Patient will get one in may  Non smoker.

## 2023-12-24 DIAGNOSIS — N89.8 VAGINAL DRYNESS: ICD-10-CM

## 2023-12-28 RX ORDER — ESTRADIOL 10 UG/1
TABLET VAGINAL
Qty: 24 TABLET | Refills: 3 | Status: SHIPPED | OUTPATIENT
Start: 2023-12-28

## 2024-03-13 ENCOUNTER — APPOINTMENT (RX ONLY)
Dept: URBAN - METROPOLITAN AREA CLINIC 125 | Facility: CLINIC | Age: 64
Setting detail: DERMATOLOGY
End: 2024-03-13

## 2024-03-13 DIAGNOSIS — L82.1 OTHER SEBORRHEIC KERATOSIS: ICD-10-CM | Status: STABLE

## 2024-03-13 DIAGNOSIS — D49.2 NEOPLASM OF UNSPECIFIED BEHAVIOR OF BONE, SOFT TISSUE, AND SKIN: ICD-10-CM | Status: INADEQUATELY CONTROLLED

## 2024-03-13 DIAGNOSIS — Z80.8 FAMILY HISTORY OF MALIGNANT NEOPLASM OF OTHER ORGANS OR SYSTEMS: ICD-10-CM

## 2024-03-13 DIAGNOSIS — L81.4 OTHER MELANIN HYPERPIGMENTATION: ICD-10-CM | Status: STABLE

## 2024-03-13 PROCEDURE — ? BIOPSY BY SHAVE METHOD

## 2024-03-13 PROCEDURE — 99203 OFFICE O/P NEW LOW 30 MIN: CPT | Mod: 25

## 2024-03-13 PROCEDURE — ? DIAGNOSIS COMMENT

## 2024-03-13 PROCEDURE — 11102 TANGNTL BX SKIN SINGLE LES: CPT

## 2024-03-13 PROCEDURE — ? COUNSELING

## 2024-03-13 ASSESSMENT — LOCATION DETAILED DESCRIPTION DERM
LOCATION DETAILED: RIGHT SUPERIOR MEDIAL UPPER BACK
LOCATION DETAILED: RIGHT INFERIOR LATERAL UPPER BACK
LOCATION DETAILED: RIGHT MEDIAL UPPER BACK

## 2024-03-13 ASSESSMENT — LOCATION ZONE DERM: LOCATION ZONE: TRUNK

## 2024-03-13 ASSESSMENT — LOCATION SIMPLE DESCRIPTION DERM: LOCATION SIMPLE: RIGHT UPPER BACK

## 2024-03-13 NOTE — PROCEDURE: BIOPSY BY SHAVE METHOD
Detail Level: Detailed
Depth Of Biopsy: dermis
Was A Bandage Applied: Yes
Size Of Lesion In Cm: 0.8
X Size Of Lesion In Cm: 0
Biopsy Type: H and E
Biopsy Method: Dermablade
Anesthesia Type: 1% lidocaine with epinephrine and a 1:10 solution of 8.4% sodium bicarbonate
Anesthesia Volume In Cc: 0.5
Hemostasis: Drysol
Wound Care: Petrolatum
Dressing: bandage
Destruction After The Procedure: No
Type Of Destruction Used: Curettage
Curettage Text: The wound bed was treated with curettage after the biopsy was performed.
Cryotherapy Text: The wound bed was treated with cryotherapy after the biopsy was performed.
Electrodesiccation Text: The wound bed was treated with electrodesiccation after the biopsy was performed.
Electrodesiccation And Curettage Text: The wound bed was treated with electrodesiccation and curettage after the biopsy was performed.
Silver Nitrate Text: The wound bed was treated with silver nitrate after the biopsy was performed.
Lab: [id number]
Consent: Written consent was obtained and risks were reviewed including but not limited to scarring, infection, bleeding, scabbing, incomplete removal, nerve damage and allergy to anesthesia.
Post-Care Instructions: I reviewed with the patient in detail post-care instructions. Patient is to keep the biopsy site dry overnight, and then apply bacitracin twice daily until healed. Patient may apply hydrogen peroxide soaks to remove any crusting.
Notification Instructions: Patient will be notified of biopsy results. However, patient instructed to call the office if not contacted within 2 weeks.
Billing Type: Third-Party Bill
Information: Selecting Yes will display possible errors in your note based on the variables you have selected. This validation is only offered as a suggestion for you. PLEASE NOTE THAT THE VALIDATION TEXT WILL BE REMOVED WHEN YOU FINALIZE YOUR NOTE. IF YOU WANT TO FAX A PRELIMINARY NOTE YOU WILL NEED TO TOGGLE THIS TO 'NO' IF YOU DO NOT WANT IT IN YOUR FAXED NOTE.

## 2024-05-06 ENCOUNTER — OFFICE VISIT (OUTPATIENT)
Dept: SLEEP CENTER | Facility: CLINIC | Age: 64
End: 2024-05-06
Payer: COMMERCIAL

## 2024-05-06 VITALS
WEIGHT: 121 LBS | DIASTOLIC BLOOD PRESSURE: 82 MMHG | BODY MASS INDEX: 21.44 KG/M2 | SYSTOLIC BLOOD PRESSURE: 132 MMHG | HEIGHT: 63 IN

## 2024-05-06 DIAGNOSIS — R06.83 SNORING: ICD-10-CM

## 2024-05-06 DIAGNOSIS — D75.1 POLYCYTHEMIA: ICD-10-CM

## 2024-05-06 DIAGNOSIS — G47.8 OTHER SLEEP DISORDERS: Primary | ICD-10-CM

## 2024-05-06 PROCEDURE — 99203 OFFICE O/P NEW LOW 30 MIN: CPT | Performed by: PSYCHIATRY & NEUROLOGY

## 2024-05-06 NOTE — PROGRESS NOTES
Assessment/Plan:    1. Other sleep disorders  -     Home Study; Future    2. Polycythemia  -     Ambulatory Referral to Sleep Medicine  -     Home Study; Future    3. Snoring    We discussed that a home sleep test would be reasonable as she describes snoring, disrupted sleep at night with nocturia, and has a history of polycythemia which is not otherwise explained.  She has a slightly crowded airway with scalloping of the tongue but she does not describe daytime sleepiness.  I have ordered a home sleep test to assess for obstructive sleep apnea.  We discussed that as she does not describe daytime sleepiness, I am not 100% certain her insurance would cover this test but hopefully will.  Potentially she could have hypoxemia which could also explain polycythemia, although if that was present, the etiology would be unclear at present.    She is familiar with CPAP as her  had an unsuccessful attempt at this.  She would be open to use of this if needed most likely.  We discussed a mandible advancing device, she has some degree of clenching her jaw but not discretely TMJ so potentially that could be consideration if she sees a sleep dentist, again it is not clear if these treatments would be necessary.      Subjective:      Patient ID: Anastasiia Kumari is a 63 y.o. female.    HPI    This is a 63-year-old female referred as a new patient due to concern for obstructive sleep apnea.  She has a history of polycythemia which led to her referral to me today.  Her most recent hemoglobin/hematocrit was 15.9/49% in October 2023 with an MCV of 99.     She is a retired physical therapist.  She has not had a sleep study in the past. Lives with her     She goes to bed at 10 PM, falls asleep easily, awakens 3 times during the night to urinate and is up at 6:30 AM when the sun comes up.      Feels refreshed when she wakes.  She denies daytime sleepiness, does not nap.  [place] Sleepiness Scale score is 0.    She  "occasionally snores, not loudly.  She denies witnessed apneas or gasping in sleep.    She denies restless legs, sleepwalking, or dream enactment.    She drinks 16 ounces of coffee a day  She drinks red wine, 5 to 6 days/week, 8 ounces.    [place] Sleepiness Scale  Sitting and reading: Would never doze  Watching TV: Would never doze  Sitting, inactive in a public place (e.g. a theatre or a meeting): Would never doze  As a passenger in a car for an hour without a break: Would never doze  Lying down to rest in the afternoon when circumstances permit: Would never doze  Sitting and talking to someone: Would never doze  Sitting quietly after a lunch without alcohol: Would never doze  In a car, while stopped for a few minutes in traffic: Would never doze  Total score: 0      Review of Systems  (as above unless noted)  No shortness of breath, no coughing, no wheezing  Occasional  headaches, thinks she has TMJ        Objective:      Visit Vitals  /82 (BP Location: Left arm, Patient Position: Sitting, Cuff Size: Adult)   Ht 5' 3\" (1.6 m)   Wt 54.9 kg (121 lb)   BMI 21.43 kg/m²   OB Status Postmenopausal   Smoking Status Never   BSA 1.56 m²             Physical Exam  Constitutional:       Appearance: Normal appearance.   HENT:      Head: Normocephalic and atraumatic.      Mouth/Throat:      Mouth: Mucous membranes are moist.   Eyes:      Extraocular Movements: Extraocular movements intact.      Pupils: Pupils are equal, round, and reactive to light.   Cardiovascular:      Rate and Rhythm: Normal rate.      Pulses: Normal pulses.      Heart sounds: Normal heart sounds.   Pulmonary:      Effort: Pulmonary effort is normal.      Breath sounds: Normal breath sounds.   Musculoskeletal:      Right lower leg: No edema.      Left lower leg: No edema.   Neurological:      Mental Status: She is alert.   Psychiatric:         Mood and Affect: Mood normal.         Behavior: Behavior normal.         Thought Content: Thought content " normal.         Judgment: Judgment normal.           12.5 inch  Mallampati 3 airway  Scalloping of tongue  No overbite  Narrowing of the left nostril

## 2024-06-19 ENCOUNTER — HOSPITAL ENCOUNTER (OUTPATIENT)
Dept: SLEEP CENTER | Facility: CLINIC | Age: 64
Discharge: HOME | End: 2024-06-19
Payer: COMMERCIAL

## 2024-06-19 DIAGNOSIS — G47.8 OTHER SLEEP DISORDERS: ICD-10-CM

## 2024-06-19 DIAGNOSIS — D75.1 POLYCYTHEMIA: ICD-10-CM

## 2024-06-19 PROCEDURE — G0399 HOME SLEEP TEST/TYPE 3 PORTA: HCPCS

## 2024-06-20 NOTE — PROGRESS NOTES
Home Sleep Study Documentation    HOME STUDY DEVICE: Noxturnal yes                                           Daily G3 no      Pre-Sleep Home Study:    Set-up and instructions performed by: Luci Delatorre, TERENCEGT, RST, CRT    Technician performed demonstration for Patient: yes    Return demonstration performed by Patient: yes    Written instructions provided to Patient: yes    Patient signed consent form: yes        Post-Sleep Home Study:    Additional comments by Patient: pending    Home Sleep Study Failed:pending    Failure reason: pending    Reported or Detected: pending    Scored by: pending

## 2024-06-24 PROBLEM — G47.33 OSA (OBSTRUCTIVE SLEEP APNEA): Status: ACTIVE | Noted: 2024-06-24

## 2024-06-28 PROBLEM — G47.8 OTHER SLEEP DISORDERS: Status: ACTIVE | Noted: 2024-06-28

## 2024-06-28 PROCEDURE — G0399 HOME SLEEP TEST/TYPE 3 PORTA: HCPCS | Performed by: PSYCHIATRY & NEUROLOGY

## 2024-07-01 DIAGNOSIS — D75.1 POLYCYTHEMIA: Primary | ICD-10-CM

## 2024-07-01 DIAGNOSIS — E83.52 HYPERCALCEMIA: ICD-10-CM

## 2024-08-07 ENCOUNTER — APPOINTMENT (OUTPATIENT)
Dept: LAB | Facility: CLINIC | Age: 64
End: 2024-08-07
Payer: COMMERCIAL

## 2024-08-07 ENCOUNTER — TELEPHONE (OUTPATIENT)
Age: 64
End: 2024-08-07

## 2024-08-07 DIAGNOSIS — E83.52 HYPERCALCEMIA: ICD-10-CM

## 2024-08-07 DIAGNOSIS — D75.1 POLYCYTHEMIA: ICD-10-CM

## 2024-08-07 LAB
25(OH)D3 SERPL-MCNC: 38.3 NG/ML (ref 30–100)
ALBUMIN SERPL BCG-MCNC: 4.4 G/DL (ref 3.5–5)
ALP SERPL-CCNC: 42 U/L (ref 34–104)
ALT SERPL W P-5'-P-CCNC: 22 U/L (ref 7–52)
ANION GAP SERPL CALCULATED.3IONS-SCNC: 11 MMOL/L (ref 4–13)
AST SERPL W P-5'-P-CCNC: 24 U/L (ref 13–39)
BASOPHILS # BLD AUTO: 0.03 THOUSANDS/ÂΜL (ref 0–0.1)
BASOPHILS NFR BLD AUTO: 1 % (ref 0–1)
BILIRUB SERPL-MCNC: 0.63 MG/DL (ref 0.2–1)
BUN SERPL-MCNC: 17 MG/DL (ref 5–25)
CALCIUM SERPL-MCNC: 9.6 MG/DL (ref 8.4–10.2)
CHLORIDE SERPL-SCNC: 98 MMOL/L (ref 96–108)
CO2 SERPL-SCNC: 28 MMOL/L (ref 21–32)
CREAT SERPL-MCNC: 0.68 MG/DL (ref 0.6–1.3)
EOSINOPHIL # BLD AUTO: 0.1 THOUSAND/ÂΜL (ref 0–0.61)
EOSINOPHIL NFR BLD AUTO: 3 % (ref 0–6)
ERYTHROCYTE [DISTWIDTH] IN BLOOD BY AUTOMATED COUNT: 12.6 % (ref 11.6–15.1)
GFR SERPL CREATININE-BSD FRML MDRD: 92 ML/MIN/1.73SQ M
GLUCOSE P FAST SERPL-MCNC: 97 MG/DL (ref 65–99)
HCT VFR BLD AUTO: 47.3 % (ref 34.8–46.1)
HGB BLD-MCNC: 15.6 G/DL (ref 11.5–15.4)
IMM GRANULOCYTES # BLD AUTO: 0 THOUSAND/UL (ref 0–0.2)
IMM GRANULOCYTES NFR BLD AUTO: 0 % (ref 0–2)
LYMPHOCYTES # BLD AUTO: 1.61 THOUSANDS/ÂΜL (ref 0.6–4.47)
LYMPHOCYTES NFR BLD AUTO: 40 % (ref 14–44)
MCH RBC QN AUTO: 32.4 PG (ref 26.8–34.3)
MCHC RBC AUTO-ENTMCNC: 33 G/DL (ref 31.4–37.4)
MCV RBC AUTO: 98 FL (ref 82–98)
MONOCYTES # BLD AUTO: 0.37 THOUSAND/ÂΜL (ref 0.17–1.22)
MONOCYTES NFR BLD AUTO: 9 % (ref 4–12)
NEUTROPHILS # BLD AUTO: 1.93 THOUSANDS/ÂΜL (ref 1.85–7.62)
NEUTS SEG NFR BLD AUTO: 47 % (ref 43–75)
NRBC BLD AUTO-RTO: 0 /100 WBCS
PLATELET # BLD AUTO: 245 THOUSANDS/UL (ref 149–390)
PMV BLD AUTO: 10.8 FL (ref 8.9–12.7)
POTASSIUM SERPL-SCNC: 4.7 MMOL/L (ref 3.5–5.3)
PROT SERPL-MCNC: 7.1 G/DL (ref 6.4–8.4)
PTH-INTACT SERPL-MCNC: 93 PG/ML (ref 12–88)
RBC # BLD AUTO: 4.82 MILLION/UL (ref 3.81–5.12)
SODIUM SERPL-SCNC: 137 MMOL/L (ref 135–147)
WBC # BLD AUTO: 4.04 THOUSAND/UL (ref 4.31–10.16)

## 2024-08-07 PROCEDURE — 84165 PROTEIN E-PHORESIS SERUM: CPT

## 2024-08-07 PROCEDURE — 84166 PROTEIN E-PHORESIS/URINE/CSF: CPT

## 2024-08-07 PROCEDURE — 36415 COLL VENOUS BLD VENIPUNCTURE: CPT

## 2024-08-07 NOTE — TELEPHONE ENCOUNTER
Pt went to the lab to have her lab work done and was told she needed a 24 hour urine. No one explained this to her. Pt has questions regarding this 24 hour urine and if she needs it she will need someone to explain it to her since the lab wouldn't explain it and told her to ask her pcp.     Does pt need this? Please advise today pt is eager to get this done since it is a full day she needs to be home for it.

## 2024-08-08 LAB
ALBUMIN SERPL ELPH-MCNC: 4.54 G/DL (ref 3.2–5.1)
ALBUMIN SERPL ELPH-MCNC: 66.8 % (ref 48–70)
ALBUMIN UR ELPH-MCNC: 100 %
ALPHA1 GLOB MFR UR ELPH: 0 %
ALPHA1 GLOB SERPL ELPH-MCNC: 0.22 G/DL (ref 0.15–0.47)
ALPHA1 GLOB SERPL ELPH-MCNC: 3.2 % (ref 1.8–7)
ALPHA2 GLOB MFR UR ELPH: 0 %
ALPHA2 GLOB SERPL ELPH-MCNC: 0.58 G/DL (ref 0.42–1.04)
ALPHA2 GLOB SERPL ELPH-MCNC: 8.5 % (ref 5.9–14.9)
B-GLOBULIN MFR UR ELPH: 0 %
BETA GLOB ABNORMAL SERPL ELPH-MCNC: 0.4 G/DL (ref 0.31–0.57)
BETA1 GLOB SERPL ELPH-MCNC: 5.9 % (ref 4.7–7.7)
BETA2 GLOB SERPL ELPH-MCNC: 4.6 % (ref 3.1–7.9)
BETA2+GAMMA GLOB SERPL ELPH-MCNC: 0.31 G/DL (ref 0.2–0.58)
GAMMA GLOB ABNORMAL SERPL ELPH-MCNC: 0.75 G/DL (ref 0.4–1.66)
GAMMA GLOB MFR UR ELPH: 0 %
GAMMA GLOB SERPL ELPH-MCNC: 11 % (ref 6.9–22.3)
IGG/ALB SER: 2.01 {RATIO} (ref 1.1–1.8)
PROT PATTERN SERPL ELPH-IMP: ABNORMAL
PROT PATTERN UR ELPH-IMP: NORMAL
PROT SERPL-MCNC: 6.8 G/DL (ref 6.4–8.2)
PROT UR-MCNC: 4 MG/DL

## 2024-08-08 PROCEDURE — 84166 PROTEIN E-PHORESIS/URINE/CSF: CPT | Performed by: PATHOLOGY

## 2024-08-08 PROCEDURE — 84165 PROTEIN E-PHORESIS SERUM: CPT | Performed by: PATHOLOGY

## 2024-08-09 ENCOUNTER — APPOINTMENT (OUTPATIENT)
Dept: LAB | Facility: CLINIC | Age: 64
End: 2024-08-09

## 2024-08-10 ENCOUNTER — APPOINTMENT (OUTPATIENT)
Dept: LAB | Facility: CLINIC | Age: 64
End: 2024-08-10
Payer: COMMERCIAL

## 2024-08-10 DIAGNOSIS — E83.52 HYPERCALCEMIA: ICD-10-CM

## 2024-08-10 LAB
CALCIUM 24H UR-MCNC: 105 MG/24 HRS (ref 100–300)
SPECIMEN VOL UR: 2500 ML

## 2024-08-10 PROCEDURE — 82340 ASSAY OF CALCIUM IN URINE: CPT

## 2024-08-11 DIAGNOSIS — E83.52 HYPERCALCEMIA: Primary | ICD-10-CM

## 2024-08-11 DIAGNOSIS — D75.1 POLYCYTHEMIA: ICD-10-CM

## 2024-08-13 ENCOUNTER — OFFICE VISIT (OUTPATIENT)
Dept: HEMATOLOGY ONCOLOGY | Facility: CLINIC | Age: 64
End: 2024-08-13
Payer: COMMERCIAL

## 2024-08-13 VITALS
BODY MASS INDEX: 20.4 KG/M2 | OXYGEN SATURATION: 96 % | RESPIRATION RATE: 18 BRPM | WEIGHT: 119.5 LBS | HEART RATE: 84 BPM | HEIGHT: 64 IN | SYSTOLIC BLOOD PRESSURE: 160 MMHG | DIASTOLIC BLOOD PRESSURE: 100 MMHG | TEMPERATURE: 97.9 F

## 2024-08-13 DIAGNOSIS — D75.1 POLYCYTHEMIA: Primary | ICD-10-CM

## 2024-08-13 DIAGNOSIS — E83.52 HYPERCALCEMIA: ICD-10-CM

## 2024-08-13 PROCEDURE — 99215 OFFICE O/P EST HI 40 MIN: CPT | Performed by: PHYSICIAN ASSISTANT

## 2024-08-13 NOTE — PATIENT INSTRUCTIONS
[facility] Medical Oncology and Hematology Team  [place] - [phone]    Your Team Member:  Advanced Practitioner:  Johana Hayes PA-C    Please answer Private and Unavailable Calls - this may be your team(s) contacting you.  If you have medical questions/concerns/issues - contact us either by (1) My Chart (2) [place]

## 2024-08-13 NOTE — PROGRESS NOTES
[address]  [facility] [place]  [address]  [facility] [id number]  Hematology Ambulatory Consult  Anastasiia Kumari, 1960, [id number]  8/13/2024      Assessment and Plan   1. Polycythemia  Patient has had chronic polycythemia dating back to 2019.  Patient's hematocrit varies between 44% upwards to 49%.  Erythropoietin level x 2 demonstrates normal values.    Differential diagnosis includes, relative polycythemia versus primary polycythemia.    Patient was seen today in the clinic at request of her PCP secondary to ongoing long-term polycythemia.  Patient does not have significant history for secondary polycythemia including good cardiovascular health, no pulmonary conditions, no obstructive sleep apnea, never tobacco user.  Patient works out daily on the [employer] for an hour.  She notes adequate hydration however, at times of blood drawl patient is going for blood testing first thing in the morning.    I would back in time reviewed patient's trends.  Recommend adequate hydration follow-up with blood tests later this week.  If blood test come back the same despite hydration then JAK2 testing will be completed.  This office will be in touch with the patient should additional testing be required.    Of note patient's son was recently found to have elevated hematocrit as well however, this individual also has multiple comorbidities that might be playing a role.    - Ambulatory Referral to Hematology / Oncology  - CBC and differential; Future    2. Hypercalcemia  Patient was found to have hypercalcemia without anemia, kidney dysfunction.  Repeat testing demonstrated resolution.  SPEP and UPEP were negative for monoclonal gammopathy.     No additional hematologic workup necessary.    The patient is scheduled for follow-up in approximately 5 weeks.     Patient voiced agreement and understanding to the above.   Patient knows to call the Hematology/Oncology office with any  questions and concerns regarding the above.    Barrier(s) to care: None.   The patient is able to self care.    Johana Hayes PA-C  Medical Oncology/Hematology  [facility]    Subjective     Chief Complaint   Patient presents with    Follow-up     Referring provider    Gabino Givens MD  [address] [place] [address]    History of present illness:   This is a 64-year-old female with past medical history of      Trends:  6/28/2016 WBC 4.9, hemoglobin 15.7, hematocrit 45.6 platelet 194    4/30/2019 WBC 5.99, hemoglobin 15.1, hematocrit 44.9, platelet count 197 (2:15 PM)  12/11/2019 WBC 4.6, hematocrit 46.4, platelet count 223  12/23/2019: Erythropoietin level 13  9/23/2020 WBC 4.8, hemoglobin 14.5, hematocrit 45.5, platelet count 203  10/7/2020 WBC 3.95, hemoglobin 14.5, hematocrit 44.5, platelet count 196    10/12/2020.  WBC 3.8, hemoglobin 16, hematocrit 46 point, platelet count 223  10/25/2023 WBC 4.9, hemoglobin 15.9, hematocrit 49%, platelets 233  erythropoietin level 8.8    8/7/2024 WBC 4.04, hemoglobin 15.6, hematocrit 47.3, MCV 98, platelet count 245   CMP WNL, SPEP and UPEP negative for monoclonal gammopathy.   PTH elevated 93      08/13/24: No major changes in functionality.  Patient exercises an hour a day on the elliptical.  Patient also takes that he walks with friends.  Patient denies past medical history of DVT PE MI, CVA.  No significant medical changes over the past year.  Patient recently evaluated for obstructive sleep apnea and was negative.    Review of Systems   Constitutional:  Negative for appetite change, fever and unexpected weight change.   HENT:  Negative for nosebleeds.    Respiratory:  Negative for cough, choking and shortness of breath.         Negative hemoptysis.   Cardiovascular:  Negative for chest pain, palpitations and leg swelling.   Gastrointestinal: Negative.  Negative for abdominal distention, abdominal pain, anal bleeding, blood in  stool, constipation, diarrhea, nausea and vomiting.   Endocrine: Negative.  Negative for cold intolerance.   Genitourinary: Negative.  Negative for hematuria, menstrual problem, vaginal bleeding, vaginal discharge and vaginal pain.   Musculoskeletal: Negative.  Negative for arthralgias, myalgias, neck pain and neck stiffness.   Skin: Negative.  Negative for color change, pallor and rash.   Allergic/Immunologic: Negative.  Negative for immunocompromised state.   Neurological: Negative.  Negative for weakness and headaches.   Hematological:  Negative for adenopathy. Does not bruise/bleed easily.   All other systems reviewed and are negative.      No past medical history on file.  Past Surgical History:   Procedure Laterality Date    DILATION AND CURETTAGE OF UTERUS  1987    miscarriage     Family History   Problem Relation Age of Onset    Atrial fibrillation Mother     Heart disease Mother     Hypertension Mother     Melanoma Father 86    No Known Problems Maternal Grandmother     No Known Problems Paternal Grandmother     Lung cancer Paternal Aunt 82    Heart disease Family     Hypertension Family      Social History     Socioeconomic History    Marital status: /Civil Union     Spouse name: Not on file    Number of children: Not on file    Years of education: Not on file    Highest education level: Not on file   Occupational History    Not on file   Tobacco Use    Smoking status: Never    Smokeless tobacco: Never   Vaping Use    Vaping status: Never Used   Substance and Sexual Activity    Alcohol use: Yes    Drug use: Never    Sexual activity: Yes     Partners: Male     Birth control/protection: Post-menopausal   Other Topics Concern    Not on file   Social History Narrative    Not on file     Social Determinants of Health     Financial Resource Strain: Not on file   Food Insecurity: Not on file   Transportation Needs: Not on file   Physical Activity: Not on file   Stress: Not on file   Social Connections: Not  "on file   Intimate Partner Violence: Not on file   Housing Stability: Not on file         Current Outpatient Medications:     fluticasone (FLONASE) 50 mcg/act nasal spray, 1 spray into each nostril daily, Disp: 18.2 mL, Rfl: 2  No Known Allergies    Objective   /100 (BP Location: Left arm)   Pulse 84   Temp 97.9 °F (36.6 °C) (Tympanic)   Resp 18   Ht 5' 3.75\" (1.619 m)   Wt 54.2 kg (119 lb 8 oz)   SpO2 96%   BMI 20.67 kg/m²   Physical Exam  Constitutional:       General: She is not in acute distress.     Appearance: She is well-developed.   HENT:      Head: Normocephalic and atraumatic.   Eyes:      General: No scleral icterus.     Pupils: Pupils are equal, round, and reactive to light.   Cardiovascular:      Rate and Rhythm: Normal rate and regular rhythm.   Pulmonary:      Effort: Pulmonary effort is normal. No respiratory distress.   Skin:     General: Skin is warm.      Coloration: Skin is not pale.      Findings: No rash.   Neurological:      Mental Status: She is alert and oriented to person, place, and time.   Psychiatric:         Thought Content: Thought content normal.         Result Review  Labs:  Appointment on 08/10/2024   Component Date Value Ref Range Status    24H Urine Volume 08/10/2024 2,500  mL Final    Calcium, 24H Urine 08/10/2024 105  100 - 300 mg/24 hrs Final   Appointment on 08/07/2024   Component Date Value Ref Range Status    A/G Ratio 08/07/2024 2.01 (H)  1.10 - 1.80 Final    Albumin Electrophoresis 08/07/2024 66.8  48.0 - 70.0 % Final    Albumin CONC 08/07/2024 4.54  3.20 - 5.10 g/dl Final    Alpha 1 08/07/2024 3.2  1.8 - 7.0 % Final    ALPHA 1 CONC 08/07/2024 0.22  0.15 - 0.47 g/dL Final    Alpha 2 08/07/2024 8.5  5.9 - 14.9 % Final    ALPHA 2 CONC 08/07/2024 0.58  0.42 - 1.04 g/dL Final    Beta-1 08/07/2024 5.9  4.7 - 7.7 % Final    BETA 1 CONC 08/07/2024 0.40  0.31 - 0.57 g/dL Final    Beta-2 08/07/2024 4.6  3.1 - 7.9 % Final    BETA 2 CONC 08/07/2024 0.31  0.20 - 0.58 g/dL " "Final    Gamma Globulin 08/07/2024 11.0  6.9 - 22.3 % Final    GAMMA CONC 08/07/2024 0.75  0.40 - 1.66 g/dL Final    Total Protein 08/07/2024 6.8  6.4 - 8.2 g/dL Final    SPEP Interpretation 08/07/2024 See Comment   Final    No monoclonal bands noted. Reviewed by: Keith Vincent MD **Electronic Signature\"\"    Urine Protein 08/07/2024 4.0  mg/dL Final    Albumin ELP, Urine 08/07/2024 100.0  % Final    Alpha 1, Urine 08/07/2024 0.0  % Final    Alpha 2, Urine 08/07/2024 0.0  % Final    Beta, Urine 08/07/2024 0.0  % Final    Gamma Globulin, Urine 08/07/2024 0.0  % Final    UPEP Interp 08/07/2024 See Comment   Final    No monoclonal bands noted. Reviewed by: Keith Vincent MD **Electronic Signature\"\"         Please note:  This report has been generated by a voice recognition software system. Therefore there may be syntax, spelling, and/or grammatical errors. Please call if you have any questions.  "

## 2024-08-13 NOTE — LETTER
August 13, 2024     Gabino Givens MD  [address] [facility]  [address]  [place] [address]    Patient: Anastasiia Kumari   YOB: 1960   Date of Visit: 8/13/2024       Dear Dr. Givens:    Thank you for referring Anastasiia Kumari to me for evaluation. Below are my notes for this consultation.    If you have questions, please do not hesitate to call me. I look forward to following your patient along with you.         Sincerely,        Johana Hayes PA-C        CC: No Recipients    Johana Hayes PA-C  8/13/2024  4:03 PM  Signed  [address]  [facility] [place]  [address] [facility] [place] [address]  Hematology Ambulatory Consult  Anastasiia Kumari, 1960, [id number]  8/13/2024      Assessment and Plan   1. Polycythemia  Patient has had chronic polycythemia dating back to 2019.  Patient's hematocrit varies between 44% upwards to 49%.  Erythropoietin level x 2 demonstrates normal values.    Differential diagnosis includes, relative polycythemia versus primary polycythemia.    Patient was seen today in the clinic at request of her PCP secondary to ongoing long-term polycythemia.  Patient does not have significant history for secondary polycythemia including good cardiovascular health, no pulmonary conditions, no obstructive sleep apnea, never tobacco user.  Patient works out daily on the [employer] for an hour.  She notes adequate hydration however, at times of blood drawl patient is going for blood testing first thing in the morning.    I would back in time reviewed patient's trends.  Recommend adequate hydration follow-up with blood tests later this week.  If blood test come back the same despite hydration then JAK2 testing will be completed.  This office will be in touch with the patient should additional testing be required.    Of note patient's son was recently found to have elevated hematocrit as well however, this individual also has multiple comorbidities that might be  playing a role.    - Ambulatory Referral to Hematology / Oncology  - CBC and differential; Future    2. Hypercalcemia  Patient was found to have hypercalcemia without anemia, kidney dysfunction.  Repeat testing demonstrated resolution.  SPEP and UPEP were negative for monoclonal gammopathy.     No additional hematologic workup necessary.    The patient is scheduled for follow-up in approximately 5 weeks.     Patient voiced agreement and understanding to the above.   Patient knows to call the Hematology/Oncology office with any questions and concerns regarding the above.    Barrier(s) to care: None.   The patient is able to self care.    Johana Hayes PA-C  Medical Oncology/Hematology  [facility]    Subjective     Chief Complaint   Patient presents with   • Follow-up     Referring provider    Gabino Givens MD  [address] [facility] [place] [address]    History of present illness:   This is a 64-year-old female with past medical history of      Trends:  6/28/2016 WBC 4.9, hemoglobin 15.7, hematocrit 45.6 platelet 194    4/30/2019 WBC 5.99, hemoglobin 15.1, hematocrit 44.9, platelet count 197 (2:15 PM)  12/11/2019 WBC 4.6, hematocrit 46.4, platelet count 223  12/23/2019: Erythropoietin level 13  9/23/2020 WBC 4.8, hemoglobin 14.5, hematocrit 45.5, platelet count 203  10/7/2020 WBC 3.95, hemoglobin 14.5, hematocrit 44.5, platelet count 196    10/12/2020.  WBC 3.8, hemoglobin 16, hematocrit 46 point, platelet count 223  10/25/2023 WBC 4.9, hemoglobin 15.9, hematocrit 49%, platelets 233  erythropoietin level 8.8    8/7/2024 WBC 4.04, hemoglobin 15.6, hematocrit 47.3, MCV 98, platelet count 245   CMP WNL, SPEP and UPEP negative for monoclonal gammopathy.   PTH elevated 93      08/13/24: No major changes in functionality.  Patient exercises an hour a day on the elliptical.  Patient also takes that he walks with friends.  Patient denies past medical history of DVT PE MI, CVA.  No significant  medical changes over the past year.  Patient recently evaluated for obstructive sleep apnea and was negative.    Review of Systems   Constitutional:  Negative for appetite change, fever and unexpected weight change.   HENT:  Negative for nosebleeds.    Respiratory:  Negative for cough, choking and shortness of breath.         Negative hemoptysis.   Cardiovascular:  Negative for chest pain, palpitations and leg swelling.   Gastrointestinal: Negative.  Negative for abdominal distention, abdominal pain, anal bleeding, blood in stool, constipation, diarrhea, nausea and vomiting.   Endocrine: Negative.  Negative for cold intolerance.   Genitourinary: Negative.  Negative for hematuria, menstrual problem, vaginal bleeding, vaginal discharge and vaginal pain.   Musculoskeletal: Negative.  Negative for arthralgias, myalgias, neck pain and neck stiffness.   Skin: Negative.  Negative for color change, pallor and rash.   Allergic/Immunologic: Negative.  Negative for immunocompromised state.   Neurological: Negative.  Negative for weakness and headaches.   Hematological:  Negative for adenopathy. Does not bruise/bleed easily.   All other systems reviewed and are negative.      No past medical history on file.  Past Surgical History:   Procedure Laterality Date   • DILATION AND CURETTAGE OF UTERUS  1987    miscarriage     Family History   Problem Relation Age of Onset   • Atrial fibrillation Mother    • Heart disease Mother    • Hypertension Mother    • Melanoma Father 86   • No Known Problems Maternal Grandmother    • No Known Problems Paternal Grandmother    • Lung cancer Paternal Aunt 82   • Heart disease Family    • Hypertension Family      Social History     Socioeconomic History   • Marital status: /Civil Union     Spouse name: Not on file   • Number of children: Not on file   • Years of education: Not on file   • Highest education level: Not on file   Occupational History   • Not on file   Tobacco Use   • Smoking  "status: Never   • Smokeless tobacco: Never   Vaping Use   • Vaping status: Never Used   Substance and Sexual Activity   • Alcohol use: Yes   • Drug use: Never   • Sexual activity: Yes     Partners: Male     Birth control/protection: Post-menopausal   Other Topics Concern   • Not on file   Social History Narrative   • Not on file     Social Determinants of Health     Financial Resource Strain: Not on file   Food Insecurity: Not on file   Transportation Needs: Not on file   Physical Activity: Not on file   Stress: Not on file   Social Connections: Not on file   Intimate Partner Violence: Not on file   Housing Stability: Not on file         Current Outpatient Medications:   •  fluticasone (FLONASE) 50 mcg/act nasal spray, 1 spray into each nostril daily, Disp: 18.2 mL, Rfl: 2  No Known Allergies    Objective   /100 (BP Location: Left arm)   Pulse 84   Temp 97.9 °F (36.6 °C) (Tympanic)   Resp 18   Ht 5' 3.75\" (1.619 m)   Wt 54.2 kg (119 lb 8 oz)   SpO2 96%   BMI 20.67 kg/m²   Physical Exam  Constitutional:       General: She is not in acute distress.     Appearance: She is well-developed.   HENT:      Head: Normocephalic and atraumatic.   Eyes:      General: No scleral icterus.     Pupils: Pupils are equal, round, and reactive to light.   Cardiovascular:      Rate and Rhythm: Normal rate and regular rhythm.   Pulmonary:      Effort: Pulmonary effort is normal. No respiratory distress.   Skin:     General: Skin is warm.      Coloration: Skin is not pale.      Findings: No rash.   Neurological:      Mental Status: She is alert and oriented to person, place, and time.   Psychiatric:         Thought Content: Thought content normal.         Result Review  Labs:  Appointment on 08/10/2024   Component Date Value Ref Range Status   • 24H Urine Volume 08/10/2024 2,500  mL Final   • Calcium, 24H Urine 08/10/2024 105  100 - 300 mg/24 hrs Final   Appointment on 08/07/2024   Component Date Value Ref Range Status   • A/G " "Ratio 08/07/2024 2.01 (H)  1.10 - 1.80 Final   • Albumin Electrophoresis 08/07/2024 66.8  48.0 - 70.0 % Final   • Albumin CONC 08/07/2024 4.54  3.20 - 5.10 g/dl Final   • Alpha 1 08/07/2024 3.2  1.8 - 7.0 % Final   • ALPHA 1 CONC 08/07/2024 0.22  0.15 - 0.47 g/dL Final   • Alpha 2 08/07/2024 8.5  5.9 - 14.9 % Final   • ALPHA 2 CONC 08/07/2024 0.58  0.42 - 1.04 g/dL Final   • Beta-1 08/07/2024 5.9  4.7 - 7.7 % Final   • BETA 1 CONC 08/07/2024 0.40  0.31 - 0.57 g/dL Final   • Beta-2 08/07/2024 4.6  3.1 - 7.9 % Final   • BETA 2 CONC 08/07/2024 0.31  0.20 - 0.58 g/dL Final   • Gamma Globulin 08/07/2024 11.0  6.9 - 22.3 % Final   • GAMMA CONC 08/07/2024 0.75  0.40 - 1.66 g/dL Final   • Total Protein 08/07/2024 6.8  6.4 - 8.2 g/dL Final   • SPEP Interpretation 08/07/2024 See Comment   Final    No monoclonal bands noted. Reviewed by: Keith Vincent MD **Electronic Signature\"\"   • Urine Protein 08/07/2024 4.0  mg/dL Final   • Albumin ELP, Urine 08/07/2024 100.0  % Final   • Alpha 1, Urine 08/07/2024 0.0  % Final   • Alpha 2, Urine 08/07/2024 0.0  % Final   • Beta, Urine 08/07/2024 0.0  % Final   • Gamma Globulin, Urine 08/07/2024 0.0  % Final   • UPEP Interp 08/07/2024 See Comment   Final    No monoclonal bands noted. Reviewed by: Keith Vincent MD **Electronic Signature\"\"         Please note:  This report has been generated by a voice recognition software system. Therefore there may be syntax, spelling, and/or grammatical errors. Please call if you have any questions.  "

## 2024-08-14 ENCOUNTER — TELEPHONE (OUTPATIENT)
Age: 64
End: 2024-08-14

## 2024-08-14 ENCOUNTER — APPOINTMENT (OUTPATIENT)
Dept: LAB | Facility: CLINIC | Age: 64
End: 2024-08-14
Payer: COMMERCIAL

## 2024-08-14 DIAGNOSIS — D75.1 POLYCYTHEMIA: ICD-10-CM

## 2024-08-14 DIAGNOSIS — D75.1 POLYCYTHEMIA: Primary | ICD-10-CM

## 2024-08-14 LAB
BASOPHILS # BLD AUTO: 0.04 THOUSANDS/ÂΜL (ref 0–0.1)
BASOPHILS NFR BLD AUTO: 1 % (ref 0–1)
EOSINOPHIL # BLD AUTO: 0.08 THOUSAND/ÂΜL (ref 0–0.61)
EOSINOPHIL NFR BLD AUTO: 1 % (ref 0–6)
ERYTHROCYTE [DISTWIDTH] IN BLOOD BY AUTOMATED COUNT: 12.4 % (ref 11.6–15.1)
HCT VFR BLD AUTO: 44.5 % (ref 34.8–46.1)
HGB BLD-MCNC: 15 G/DL (ref 11.5–15.4)
IMM GRANULOCYTES # BLD AUTO: 0.03 THOUSAND/UL (ref 0–0.2)
IMM GRANULOCYTES NFR BLD AUTO: 1 % (ref 0–2)
LYMPHOCYTES # BLD AUTO: 1.86 THOUSANDS/ÂΜL (ref 0.6–4.47)
LYMPHOCYTES NFR BLD AUTO: 32 % (ref 14–44)
MCH RBC QN AUTO: 32.3 PG (ref 26.8–34.3)
MCHC RBC AUTO-ENTMCNC: 33.7 G/DL (ref 31.4–37.4)
MCV RBC AUTO: 96 FL (ref 82–98)
MONOCYTES # BLD AUTO: 0.55 THOUSAND/ÂΜL (ref 0.17–1.22)
MONOCYTES NFR BLD AUTO: 9 % (ref 4–12)
NEUTROPHILS # BLD AUTO: 3.32 THOUSANDS/ÂΜL (ref 1.85–7.62)
NEUTS SEG NFR BLD AUTO: 56 % (ref 43–75)
NRBC BLD AUTO-RTO: 0 /100 WBCS
PLATELET # BLD AUTO: 229 THOUSANDS/UL (ref 149–390)
PMV BLD AUTO: 10.9 FL (ref 8.9–12.7)
RBC # BLD AUTO: 4.64 MILLION/UL (ref 3.81–5.12)
WBC # BLD AUTO: 5.88 THOUSAND/UL (ref 4.31–10.16)

## 2024-08-14 PROCEDURE — 85025 COMPLETE CBC W/AUTO DIFF WBC: CPT

## 2024-08-14 PROCEDURE — 36415 COLL VENOUS BLD VENIPUNCTURE: CPT

## 2024-08-14 NOTE — TELEPHONE ENCOUNTER
Received a phone call from patient.  Patient stated that she is at the lab and a new lab order for a CBC and Diff needs to be placed, due to current order has an end date and it would be too soon to draw lab.  Patient saw Johana Hayes yesterday and as per note, plan is to have CBC and Diff drawn. Lab ordered entered.

## 2024-08-15 ENCOUNTER — TELEPHONE (OUTPATIENT)
Dept: HEMATOLOGY ONCOLOGY | Facility: CLINIC | Age: 64
End: 2024-08-15

## 2024-08-15 NOTE — TELEPHONE ENCOUNTER
Telephone call spoke with Pt.  Reviewed Johana's note.  Pt stated great and she has been staying well hydrated.  Her fu appt will be canceled.

## 2024-08-15 NOTE — TELEPHONE ENCOUNTER
----- Message from Johaan Hayes PA-C sent at 8/15/2024  1:46 PM EDT -----  Well hydreated - all the blood work returned normal - cancel follow up appt and notify pt

## 2024-10-10 NOTE — PROGRESS NOTES
Anastasiia Kumari 64 y.o. female MRN: [id number]    Encounter: [id number]      Assessment & Plan     Assessment:  This is a 64 y.o.-year-old female with low level hypercalcemia for several years that corrects to normal when patient's upper-range normal albumin levels are factored in.  She stopped additional calcium and vitamin D supplementation last November but kept dietary calcium intake, and PTH has increased from 61 to 93.    1. Hypercalcemia  Assessment & Plan:  Suspect calcium is appearing occasionally elevated but is actually physiologically normal on corrected calciums due to upper-normal albumin levels in this healthy and well-nourished and active 64-year-old female  Patient already has had normal SPEP/UPEP testing, relatively low urinary calcium excretion     Will complete workup with phosphorus, magnesium, ionized calcium  We will repeat vitamin D as patient has been off of it for almost 1 year  Orders:  -     Ambulatory Referral to Endocrinology  -     Magnesium; Future  -     Phosphorus; Future  -     Calcium, ionized; Future  -     Vitamin D 25 hydroxy; Future  2. Vitamin D deficiency  -     Vitamin D 25 hydroxy; Future  3. High serum parathyroid hormone (PTH)  Assessment & Plan:  Suspect patient now with a secondary hyperparathyroidism due to stopping additional calcium intake and vitamin D in November 2023  Fortunately she does obtain most of her dietary calcium requirement through diet    Recommend repeating PTH level in 3 to 6 months with PCP after resuming vitamin D and multivitamin  4. Osteopenia of left hip  Assessment & Plan:  Management per primary    Follow-up as needed pending labs    CC: Hypercalcemia    History of Present Illness     HPI:  64 y.o. female with past medical history significant for Raynaud's, KUN, osteopenia, polycythemia, vitamin D deficiency, presenting consult for hypercalcemia.     Told to stop calcium by PCP November 2023.. Used to take womens multivitamin (300mg),  calcium supplement 500-600mg, plus prn TUMS, plus 2 dairy servings, 1 green serving a day, sometimes extra and sardince.  Cut out supplements including vitamin.  Active, likes to golf.    Never kidney stones.  Never anemic.  No family hx osteoporosis, calcium issues, parathyroid issues, kidney stones    One child  of ALEX  endocardial fibroelastosis, maybe genetic links?    Patient did also see oncology 2024 for mild chronic polycythemia, told reassuring.     8/10/2020 four 24-hour urine calcium 2500 cc, calcium 105,  2024 PTH 93, calcium 9.6 (9.3 corrected), SPEP negative, UPEP negative  2023 calcium 10.3 stopped additional calcium /multivitamin/D  2023 calcium 10   10/25/2023 PTH 61.4  10/24/2023 calcium 10.3  10/12/2023 calcium 10.3 (9.9 corrected) vitamin D 62.8  10/7/2022 calcium 9.6 (9.4 corrected) vitamin D 42.3  2021 calcium 9.8 (9.3 corrected)  2020 calcium 9.8 (9.6 corrected) vitamin D 29.9  2019 calcium 9.9 (9.2 corrected)  2019 calcium 9.4 (9.1 corrected)  2017 calcium 9.6 (9.4 corrected) vitamin D 35.8    TSH historically WNL    Review of Systems   Constitutional:  Negative for activity change, appetite change and unexpected weight change.   Genitourinary:  Negative for difficulty urinating.       Historical Information   No past medical history on file.  Past Surgical History:   Procedure Laterality Date    DILATION AND CURETTAGE OF UTERUS      miscarriage     Social History   Social History     Substance and Sexual Activity   Alcohol Use Yes     Social History     Substance and Sexual Activity   Drug Use Never     Social History     Tobacco Use   Smoking Status Never    Passive exposure: Past   Smokeless Tobacco Never     Family History:   Family History   Problem Relation Age of Onset    Atrial fibrillation Mother     Heart disease Mother     Hypertension Mother     Melanoma Father 86    No Known Problems Maternal Grandmother     No Known Problems Paternal  "Grandmother     Lung cancer Paternal Aunt 82    Heart disease Family     Hypertension Family        Meds/Allergies   Current Outpatient Medications   Medication Sig Dispense Refill    fluticasone (FLONASE) 50 mcg/act nasal spray 1 spray into each nostril daily 18.2 mL 2     No current facility-administered medications for this visit.     No Known Allergies    Objective   Vitals: Blood pressure 128/88, height 5' 3.75\" (1.619 m), weight 53.4 kg (117 lb 12.8 oz).    Physical Exam  Constitutional:       General: She is not in acute distress.     Appearance: Normal appearance. She is well-developed, well-groomed and normal weight. She is not ill-appearing, toxic-appearing or diaphoretic.   HENT:      Head: Normocephalic and atraumatic.      Nose: Nose normal.   Eyes:      Extraocular Movements: Extraocular movements intact.      Conjunctiva/sclera: Conjunctivae normal.      Pupils: Pupils are equal, round, and reactive to light.   Neck:      Thyroid: No thyroid mass, thyromegaly or thyroid tenderness.   Pulmonary:      Effort: Pulmonary effort is normal. No respiratory distress.   Abdominal:      General: There is no distension.   Musculoskeletal:         General: No deformity.      Right lower leg: No edema.      Left lower leg: No edema.   Skin:     General: Skin is warm and dry.   Neurological:      General: No focal deficit present.      Mental Status: She is alert. Mental status is at baseline.   Psychiatric:         Mood and Affect: Mood normal.         Behavior: Behavior normal.         Thought Content: Thought content normal.         The history was obtained from the review of the chart, patient.    Lab Results:   Lab Results   Component Value Date/Time    Potassium 4.7 08/07/2024 08:59 AM    Potassium 4.6 11/14/2023 09:09 AM    Potassium 6.2 (H) 11/02/2023 08:52 AM    Chloride 98 08/07/2024 08:59 AM    Chloride 101 11/14/2023 09:09 AM    Chloride 102 11/02/2023 08:52 AM    CO2 28 08/07/2024 08:59 AM    CO2 29 " 11/14/2023 09:09 AM    CO2 29 11/02/2023 08:52 AM    BUN 17 08/07/2024 08:59 AM    BUN 17 11/14/2023 09:09 AM    BUN 19 11/02/2023 08:52 AM    Creatinine 0.68 08/07/2024 08:59 AM    Creatinine 0.68 11/14/2023 09:09 AM    Creatinine 0.76 11/02/2023 08:52 AM    Glucose, Fasting 97 08/07/2024 08:59 AM    Glucose, Fasting 105 (H) 11/14/2023 09:09 AM    Glucose, Fasting 114 (H) 11/02/2023 08:52 AM    Calcium 9.6 08/07/2024 08:59 AM    Calcium 10.3 (H) 11/14/2023 09:09 AM    Calcium 10.0 11/02/2023 08:52 AM    eGFR 92 08/07/2024 08:59 AM    eGFR 93 11/14/2023 09:09 AM    eGFR 83 11/02/2023 08:52 AM    PTH 93.0 (H) 08/07/2024 08:59 AM    PTH 61.4 10/25/2023 01:59 PM    Vit D, 25-Hydroxy 38.3 08/07/2024 08:59 AM         Imaging Studies:         Results for orders placed during the hospital encounter of 11/18/22    DXA bone density spine hip and pelvis    Impression  1. Based on the WHO classification, this study identifies a diagnosis of low bone mass, notable at the total hip and femoral neck areas and the patient is considered at low risk for fracture.    2. A daily intake of calcium of at least 1200 mg and vitamin D, 800-1000 IU, as well as weight bearing and muscle strengthening exercise, fall prevention and avoidance of tobacco and excessive alcohol intake as basic preventive measures are recommended.    3. Repeat DXA scan on the same equipment in 18-24 months as clinically indicated.      The 10 year risk of hip fracture is 0.8%, with the 10 year risk of major osteoporotic fracture being 7.8%, as calculated by the WHO fracture risk assessment tool (FRAX).  The current NOF guidelines recommend treating patients with FRAX 10 year risk score  of >3% for hip fracture and >20% for major osteoporotic fracture.    WHO CLASSIFICATION:  Normal (a T-score of -1.0 or higher)  Low bone mineral density (a T-score of less than -1.0 but higher than -2.5)  Osteoporosis (a T-score of -2.5 or less)  Severe osteoporosis (a T-score of  "-2.5 or less with a fragility fracture)    Thank you for allowing us the opportunity to participate in your patient care.    The expanded DEXA report will no longer be arriving in your mail. If you desire to view the full report please contact [facility] records or access the PACS system.          Workstation performed: [id number]            Results Review Statement: No pertinent imaging studies reviewed.    Portions of the record may have been created with voice recognition software. Occasional wrong word or \"sound a like\" substitutions may have occurred due to the inherent limitations of voice recognition software. Read the chart carefully and recognize, using context, where substitutions have occurred.    "

## 2024-10-11 ENCOUNTER — CONSULT (OUTPATIENT)
Dept: ENDOCRINOLOGY | Facility: CLINIC | Age: 64
End: 2024-10-11
Payer: COMMERCIAL

## 2024-10-11 ENCOUNTER — TELEPHONE (OUTPATIENT)
Age: 64
End: 2024-10-11

## 2024-10-11 ENCOUNTER — RA CDI HCC (OUTPATIENT)
Dept: OTHER | Facility: HOSPITAL | Age: 64
End: 2024-10-11

## 2024-10-11 VITALS
DIASTOLIC BLOOD PRESSURE: 88 MMHG | BODY MASS INDEX: 20.11 KG/M2 | WEIGHT: 117.8 LBS | SYSTOLIC BLOOD PRESSURE: 128 MMHG | HEIGHT: 64 IN

## 2024-10-11 DIAGNOSIS — M85.852 OSTEOPENIA OF LEFT HIP: ICD-10-CM

## 2024-10-11 DIAGNOSIS — E55.9 VITAMIN D DEFICIENCY: ICD-10-CM

## 2024-10-11 DIAGNOSIS — R79.89 HIGH SERUM PARATHYROID HORMONE (PTH): ICD-10-CM

## 2024-10-11 DIAGNOSIS — E83.52 HYPERCALCEMIA: Primary | ICD-10-CM

## 2024-10-11 PROCEDURE — 99244 OFF/OP CNSLTJ NEW/EST MOD 40: CPT | Performed by: STUDENT IN AN ORGANIZED HEALTH CARE EDUCATION/TRAINING PROGRAM

## 2024-10-11 NOTE — PATIENT INSTRUCTIONS
--restart multivitamin  --get labs  --after labs, probably restart Vit D 1000-2000u daily   --recommend repeating PTH in a few months back on vitamin D and multivitamin

## 2024-10-11 NOTE — TELEPHONE ENCOUNTER
Patient was originally scheduled for 10/21 and cannot do that date. She requested December. Scheduled.

## 2024-10-11 NOTE — ASSESSMENT & PLAN NOTE
Suspect patient now with a secondary hyperparathyroidism due to stopping additional calcium intake and vitamin D in November 2023  Fortunately she does obtain most of her dietary calcium requirement through diet    Recommend repeating PTH level in 3 to 6 months with PCP after resuming vitamin D and multivitamin   Cigarettes

## 2024-10-11 NOTE — TELEPHONE ENCOUNTER
Patient called to cancel her October 14th appointment with doctor Givens for her physical because she's trying to get some things and blood work taken care of through her endocrinologist. She didn't want to reschedule her physical for two or three weeks after the 14th but I looked for the whole rest of the year and could not find any openings for a physical with doctor Givens at all. Can someone please take a look at the schedule and see if there is anything that could be done to accommodate her into the schedule before the end of the year and please follow up with the patient in regards to this     thank you

## 2024-10-11 NOTE — ASSESSMENT & PLAN NOTE
Suspect calcium is appearing occasionally elevated but is actually physiologically normal on corrected calciums due to upper-normal albumin levels in this healthy and well-nourished and active 64-year-old female  Patient already has had normal SPEP/UPEP testing, relatively low urinary calcium excretion     Will complete workup with phosphorus, magnesium, ionized calcium  We will repeat vitamin D as patient has been off of it for almost 1 year

## 2024-10-28 ENCOUNTER — HOSPITAL ENCOUNTER (OUTPATIENT)
Dept: MAMMOGRAPHY | Facility: MEDICAL CENTER | Age: 64
Discharge: HOME | End: 2024-10-28
Payer: COMMERCIAL

## 2024-10-28 VITALS — WEIGHT: 117 LBS | HEIGHT: 64 IN | BODY MASS INDEX: 19.97 KG/M2

## 2024-10-28 DIAGNOSIS — Z12.31 ENCOUNTER FOR SCREENING MAMMOGRAM FOR MALIGNANT NEOPLASM OF BREAST: ICD-10-CM

## 2024-10-28 PROCEDURE — 77067 SCR MAMMO BI INCL CAD: CPT

## 2024-10-28 PROCEDURE — 77063 BREAST TOMOSYNTHESIS BI: CPT

## 2024-10-29 ENCOUNTER — APPOINTMENT (OUTPATIENT)
Dept: LAB | Facility: CLINIC | Age: 64
End: 2024-10-29
Payer: COMMERCIAL

## 2024-10-29 DIAGNOSIS — E83.52 HYPERCALCEMIA: ICD-10-CM

## 2024-10-29 DIAGNOSIS — E55.9 VITAMIN D DEFICIENCY: ICD-10-CM

## 2024-10-29 LAB
25(OH)D3 SERPL-MCNC: 30.2 NG/ML (ref 30–100)
CA-I BLD-SCNC: 1.28 MMOL/L (ref 1.12–1.32)
MAGNESIUM SERPL-MCNC: 2.1 MG/DL (ref 1.9–2.7)
PHOSPHATE SERPL-MCNC: 3.4 MG/DL (ref 2.3–4.1)

## 2024-10-29 PROCEDURE — 82306 VITAMIN D 25 HYDROXY: CPT

## 2024-10-29 PROCEDURE — 84100 ASSAY OF PHOSPHORUS: CPT

## 2024-10-29 PROCEDURE — 83735 ASSAY OF MAGNESIUM: CPT

## 2024-10-29 PROCEDURE — 82330 ASSAY OF CALCIUM: CPT

## 2024-10-29 PROCEDURE — 36415 COLL VENOUS BLD VENIPUNCTURE: CPT

## 2024-11-06 ENCOUNTER — TELEPHONE (OUTPATIENT)
Age: 64
End: 2024-11-06

## 2024-11-06 ENCOUNTER — PATIENT MESSAGE (OUTPATIENT)
Dept: OBGYN CLINIC | Facility: CLINIC | Age: 64
End: 2024-11-06

## 2024-11-06 NOTE — TELEPHONE ENCOUNTER
Pt had an appt with Dr. Boudreaux for today, cancelled due to provider being sick. Pt presents with questions for provider.    Pt stated she was taking Premarin for vaginal dryness/discomfort prior to establishing care Dr Boudreaux. Pt is inquiring if this is something her provider could prescribe and out of pocket cost bc she does not believe her insurance would cover.    Pt is also inquiring about completing a breast US. She had a recent mammo and was told she has very dense breast tissue. She would like the providers recommendation.    Thank you.

## 2024-11-08 DIAGNOSIS — R92.30 DENSE BREAST TISSUE: Primary | ICD-10-CM

## 2024-11-08 DIAGNOSIS — N89.8 VAGINAL DRYNESS: Primary | ICD-10-CM

## 2024-11-08 NOTE — TELEPHONE ENCOUNTER
Left vm advising pt that medication will most likely not be covered by insurance but still can be sent in. I also offered her an appt on Friday 11/15 with tico in the [place] office. I put her in just please confirm she's okay with it.

## 2024-11-08 NOTE — TELEPHONE ENCOUNTER
Spoke with pt, discussed recommendations for breast US or Mri, pt decided on US, and order was placed. Pt told she would get a call back regarding her medication.

## 2024-11-11 DIAGNOSIS — N89.8 VAGINAL DRYNESS: ICD-10-CM

## 2024-11-11 RX ORDER — CONJUGATED ESTROGENS 0.62 MG/G
1 CREAM VAGINAL 2 TIMES WEEKLY
Qty: 90 G | Refills: 3 | Status: SHIPPED | OUTPATIENT
Start: 2024-11-11 | End: 2024-11-11 | Stop reason: SDUPTHER

## 2024-11-11 NOTE — TELEPHONE ENCOUNTER
Pt states script was sent to wrong pharmacy. Pt requesting script to be cancelled from [facility] and sent to [facility]    Medication: estrogens, conjugated (Premarin) vaginal cream     Dose/Frequency: Insert 1 g into the vagina 2 (two) times a week     Quantity: 90 g     Pharmacy: [id number] - [place] - [address]     Office:   [] PCP/Provider -   [x] Speciality/Provider -     Does the patient have enough for 3 days?   [] Yes   [x] No - Send as HP to POD

## 2024-11-12 RX ORDER — CONJUGATED ESTROGENS 0.62 MG/G
1 CREAM VAGINAL 2 TIMES WEEKLY
Qty: 90 G | Refills: 3 | Status: SHIPPED | OUTPATIENT
Start: 2024-11-14 | End: 2024-11-22

## 2024-11-15 ENCOUNTER — OFFICE VISIT (OUTPATIENT)
Dept: OBGYN CLINIC | Facility: MEDICAL CENTER | Age: 64
End: 2024-11-15
Payer: COMMERCIAL

## 2024-11-15 VITALS
SYSTOLIC BLOOD PRESSURE: 132 MMHG | HEIGHT: 63 IN | BODY MASS INDEX: 21 KG/M2 | WEIGHT: 118.5 LBS | DIASTOLIC BLOOD PRESSURE: 80 MMHG

## 2024-11-15 DIAGNOSIS — Z12.31 BREAST CANCER SCREENING BY MAMMOGRAM: ICD-10-CM

## 2024-11-15 DIAGNOSIS — Z12.4 SCREENING FOR CERVICAL CANCER: ICD-10-CM

## 2024-11-15 DIAGNOSIS — Z01.419 ENCOUNTER FOR WELL WOMAN EXAM WITH ROUTINE GYNECOLOGICAL EXAM: Primary | ICD-10-CM

## 2024-11-15 PROCEDURE — S0612 ANNUAL GYNECOLOGICAL EXAMINA: HCPCS | Performed by: OBSTETRICS & GYNECOLOGY

## 2024-11-15 PROCEDURE — G0476 HPV COMBO ASSAY CA SCREEN: HCPCS | Performed by: OBSTETRICS & GYNECOLOGY

## 2024-11-15 PROCEDURE — G0145 SCR C/V CYTO,THINLAYER,RESCR: HCPCS | Performed by: OBSTETRICS & GYNECOLOGY

## 2024-11-15 NOTE — PROGRESS NOTES
"[facility] [address], [place], [place]    ASSESSMENT/PLAN: Anastasiia Kumari is a 64 y.o.  who presents for annual gynecologic exam.  Routine well woman exam completed today.  Cervical Cancer Screening: Current ASCCP Guidelines reviewed. Last Pap: . Next Pap Due: every 2 years  Breast cancer screening: done , scheduled  DEXA normal   Colon cancer screening recommended, done     CC:  Annual Gynecologic Examination    HPI: Anastasiia Kumari is a 64 y.o.  who presents for annual gynecologic examination.  No gyn complaints; uses Premarin vaginal cream for vaginal dryness    Gynecologic Exam        The following portions of the patient's history were reviewed and updated as appropriate: allergies, current medications, past family history, past medical history, obstetric history, gynecologic history, past social history, past surgical history and problem list.    Review of Systems   Constitutional: Negative.    HENT: Negative.     Eyes: Negative.    Respiratory: Negative.     Cardiovascular: Negative.    Gastrointestinal: Negative.    Genitourinary: Negative.    Musculoskeletal: Negative.    All other systems reviewed and are negative.        Objective:  /80   Ht 5' 3\" (1.6 m)   Wt 53.8 kg (118 lb 8 oz)   BMI 20.99 kg/m²    Physical Exam  Vitals reviewed.   Constitutional:       General: She is not in acute distress.     Appearance: She is well-developed.   HENT:      Head: Normocephalic and atraumatic.      Nose: Nose normal.   Cardiovascular:      Rate and Rhythm: Normal rate.   Pulmonary:      Effort: Pulmonary effort is normal. No respiratory distress.   Chest:   Breasts:     Breasts are symmetrical.      Right: Normal. No mass, nipple discharge, skin change or tenderness.      Left: Normal. No mass, nipple discharge, skin change or tenderness.   Abdominal:      General: There is no distension.      Palpations: Abdomen is soft. There is no mass. "      Tenderness: There is no abdominal tenderness. There is no guarding or rebound.   Genitourinary:     General: Normal vulva.      Exam position: Lithotomy position.      Labia:         Right: No lesion.         Left: No lesion.       Urethra: No prolapse (urethral meatus normal).      Vagina: Normal. No vaginal discharge, erythema or bleeding.      Cervix: Normal.      Uterus: Normal.       Adnexa: Right adnexa normal and left adnexa normal.      Comments: Pap done   Musculoskeletal:         General: Normal range of motion.      Cervical back: Normal range of motion.   Lymphadenopathy:      Upper Body:      Right upper body: No supraclavicular, axillary or pectoral adenopathy.      Left upper body: No supraclavicular, axillary or pectoral adenopathy.      Lower Body: No left inguinal adenopathy.   Skin:     General: Skin is warm and dry.   Neurological:      Mental Status: She is alert and oriented to person, place, and time.   Psychiatric:         Behavior: Behavior normal.         Thought Content: Thought content normal.         Judgment: Judgment normal.

## 2024-11-20 DIAGNOSIS — N89.8 VAGINAL DRYNESS: ICD-10-CM

## 2024-11-21 ENCOUNTER — TELEPHONE (OUTPATIENT)
Age: 64
End: 2024-11-21

## 2024-11-21 LAB
LAB AP GYN PRIMARY INTERPRETATION: NORMAL
Lab: NORMAL

## 2024-11-21 NOTE — TELEPHONE ENCOUNTER
[place] for (Premarin) vaginal cream SUBMITTED to [employer]    via    []CMM-KEY:   [x]Surescripts-Case ID #   []Availity-Auth ID # NDC #   []Faxed to plan   []Other website   []Phone call Case ID #     []PA sent as URGENT    All office notes, labs and other pertaining documents and studies sent. Clinical questions answered. Awaiting determination from insurance company.     Turnaround time for your insurance to make a decision on your Prior Authorization can take 7-21 business days.

## 2024-11-21 NOTE — TELEPHONE ENCOUNTER
As per Yecenia, Premarin is being denied from the pharmacy due to needing a prior auth for coverage exception. As per Yecenia please use covermymeds

## 2024-11-22 ENCOUNTER — RESULTS FOLLOW-UP (OUTPATIENT)
Dept: OBGYN CLINIC | Facility: CLINIC | Age: 64
End: 2024-11-22

## 2024-11-22 RX ORDER — CONJUGATED ESTROGENS 0.62 MG/G
CREAM VAGINAL
Qty: 90 G | Refills: 3 | Status: SHIPPED | OUTPATIENT
Start: 2024-11-22

## 2024-11-22 NOTE — TELEPHONE ENCOUNTER
[place] for (Premarin) vaginal cream APPROVED     Date(s) approved November 21, 2024 to November 21, 2025     Case #    Patient advised by      - office sent message to patient    []MyChart Message  []Phone call   []LMOM  []L/M to call office as no active Communication consent on file  []Unable to leave detailed message as VM not approved on Communication consent       Pharmacy advised by    [x]Fax  []Phone call    Approval letter scanned into [employer] Yes

## 2024-12-04 ENCOUNTER — TELEPHONE (OUTPATIENT)
Dept: OBGYN CLINIC | Facility: CLINIC | Age: 64
End: 2024-12-04

## 2024-12-04 DIAGNOSIS — N89.8 VAGINAL DRYNESS: ICD-10-CM

## 2024-12-04 RX ORDER — CONJUGATED ESTROGENS 0.62 MG/G
1 CREAM VAGINAL 2 TIMES WEEKLY
Qty: 90 G | Refills: 3 | Status: SHIPPED | OUTPATIENT
Start: 2024-12-05

## 2024-12-04 NOTE — TELEPHONE ENCOUNTER
Spoke to pharmacist,unable to give an equivalent of the premarin, would need a new rx for something else, pt aware & aware provider is not in office, msg sent to provider.

## 2024-12-04 NOTE — TELEPHONE ENCOUNTER
Pt is going to speak with her insurance company regarding her options and what else would be covered, will keep us updated.

## 2025-05-14 ENCOUNTER — HOSPITAL ENCOUNTER (OUTPATIENT)
Dept: RADIOLOGY | Age: 65
Discharge: HOME | End: 2025-05-14
Payer: COMMERCIAL

## 2025-05-14 VITALS — HEIGHT: 63 IN | WEIGHT: 118 LBS | BODY MASS INDEX: 20.91 KG/M2

## 2025-05-14 DIAGNOSIS — R92.30 DENSE BREAST TISSUE: ICD-10-CM

## 2025-05-14 PROCEDURE — 76641 ULTRASOUND BREAST COMPLETE: CPT

## 2025-05-15 ENCOUNTER — TELEPHONE (OUTPATIENT)
Age: 65
End: 2025-05-15

## 2025-05-15 NOTE — TELEPHONE ENCOUNTER
Pt calling in about mammogram results, pt was notified Dr Boudreaux ordered the testing, and her results will need to be forwarded to another provider to review, pt was notified it may take up to 72hrs for the provider to review results, pt verbalized understanding.

## 2025-05-19 ENCOUNTER — RESULTS FOLLOW-UP (OUTPATIENT)
Dept: OBGYN CLINIC | Facility: MEDICAL CENTER | Age: 65
End: 2025-05-19

## 2025-05-19 NOTE — TELEPHONE ENCOUNTER
"Pt calling in for DEXA scan results, pt was notified from the breast center, to come back for a new \"targeted US\". Pt has additional testing scheduled but would like to know why she needs the further testing.   "

## 2025-05-19 NOTE — RESULT ENCOUNTER NOTE
Please notify pt of recommendation for ultrasound of the left breast due to incomplete imaging.  This is Dr. Boudreaux's patient.

## 2025-05-20 NOTE — TELEPHONE ENCOUNTER
Placed call to patient to notify her of Dr Vann's recommendations, pt has additional imaging scheduled with breast center, no answer, left a non detailed voice message to call back with phone number provided [phone]

## 2025-05-21 ENCOUNTER — TELEPHONE (OUTPATIENT)
Dept: MAMMOGRAPHY | Facility: CLINIC | Age: 65
End: 2025-05-21

## 2025-05-21 ENCOUNTER — HOSPITAL ENCOUNTER (OUTPATIENT)
Dept: RADIOLOGY | Facility: CLINIC | Age: 65
Discharge: HOME | End: 2025-05-21
Attending: OBSTETRICS & GYNECOLOGY
Payer: COMMERCIAL

## 2025-05-21 VITALS — HEIGHT: 63 IN | WEIGHT: 118 LBS | BODY MASS INDEX: 20.91 KG/M2

## 2025-05-21 VITALS — HEART RATE: 71 BPM | DIASTOLIC BLOOD PRESSURE: 100 MMHG | SYSTOLIC BLOOD PRESSURE: 177 MMHG

## 2025-05-21 DIAGNOSIS — R92.8 ABNORMAL MAMMOGRAM: ICD-10-CM

## 2025-05-21 DIAGNOSIS — R92.8 ABNORMAL FINDING ON BREAST IMAGING: ICD-10-CM

## 2025-05-21 PROCEDURE — 88305 TISSUE EXAM BY PATHOLOGIST: CPT | Performed by: PATHOLOGY

## 2025-05-21 PROCEDURE — A4648 IMPLANTABLE TISSUE MARKER: HCPCS

## 2025-05-21 PROCEDURE — 76642 ULTRASOUND BREAST LIMITED: CPT

## 2025-05-21 PROCEDURE — 88342 IMHCHEM/IMCYTCHM 1ST ANTB: CPT | Performed by: PATHOLOGY

## 2025-05-21 PROCEDURE — 88360 TUMOR IMMUNOHISTOCHEM/MANUAL: CPT | Performed by: PATHOLOGY

## 2025-05-21 PROCEDURE — 19083 BX BREAST 1ST LESION US IMAG: CPT

## 2025-05-21 PROCEDURE — 88341 IMHCHEM/IMCYTCHM EA ADD ANTB: CPT | Performed by: PATHOLOGY

## 2025-05-21 RX ORDER — LIDOCAINE HYDROCHLORIDE 10 MG/ML
5 INJECTION, SOLUTION EPIDURAL; INFILTRATION; INTRACAUDAL; PERINEURAL ONCE
Status: DISCONTINUED | OUTPATIENT
Start: 2025-05-21 | End: 2025-05-25 | Stop reason: HOSPADM

## 2025-05-21 NOTE — DISCHARGE INSTR - OTHER ORDERS
POST LARGE CORE BREAST BIOPSY PATIENT INFORMATION      Place an ice pack inside your bra over the top of the dressing every hour for 20 minutes (20 minutes on, 60 minutes off). Do this until bedtime.    Do not shower or bathe until the following morning.    You may bathe your breast carefully with the steri-strips in place.  Be careful    Not to loosen them.  The steri-strips will fall off in 3-5 days.    You may have mild discomfort, and you may have some bruising where the   Needle entered the skin.  This should clear within 5-7 days.    If you need medicine for discomfort, take acetaminophen products such as   Tylenol. You may also take Advil or Motrin products.    Do not participate in strenuous activities such as-tennis, aerobics, skiing,  Weight lifting, etc. for 24 hours. Refrain from swimming/soaking for 72 hours.    Wearing a bra for sleeping may be more comfortable for the first 24-48 hours.    Watch for continued bleeding, pain or fever over 101. If any of these symptoms occur, please contact our    breast nurse navigator at the location where your biopsy was performed.    During normal business hours (7:30 am-4:00 pm) please call the nurse navigator at the site where your   procedure was performed:    [facility]/[place]:  [phone], [phone] or [phone]  [facility]:     [phone] or [phone]  [facility]:    [phone] or [phone]  [facility]/[place]:   [phone] or [phone]  [facility]:   [phone]  [facility]:     [phone]              After 4 PM - please call your physician or go to the nearest Emergency Department location.            9.         The final results of your biopsy are usually available within one week.

## 2025-05-21 NOTE — PROGRESS NOTES
Procedure type:    ___x__ultrasound guided _____stereotactic _____ MRI guided    Breast:    ___x__Left _____Right    Location: 11:00 4cmfn    Needle: 12g Mike    # of passes: 4    Clip: dragonfly    Performed by: Dr. Barlow    Pressure held for 5 minutes by: Shelley Leos Strips:    __x___yes _____no    Band aid:    __x___yes_____no    Tape and guaze:    _____yes __x___no    Tolerated procedure:    ___x__yes _____no

## 2025-05-21 NOTE — TELEPHONE ENCOUNTER
Patient had a Left breast biopsy today.     BP was elevated prior to the procedure at 178/96.   Post procedure BP was 171/100.     Patient states she has a BP cuff at home and will check it this evening. I instructed the patient to go to ED if rates continue to be elevated or she becomes symptomatic. She verbalized understanding. Routing this message to her PCP to make him aware.

## 2025-05-21 NOTE — PROGRESS NOTES
Met with patient and Dr. Barlow regarding recommendation for;    _____ RIGHT __x____LEFT      ___x__Ultrasound guided  ______Stereotactic breast biopsy.      __X___Verbalized understanding.    Reviewed clip placement with patient, pt states understanding: Yes: __x__ No: ____  Comments:    Blood thinners:  No: _x____ Yes: ______ What:          Biopsy teaching sheet given:  Yes: ___X___ No: ________    Pt given contact information and adv to call with any questions/needs

## 2025-05-22 ENCOUNTER — RESULTS FOLLOW-UP (OUTPATIENT)
Dept: OBGYN CLINIC | Facility: CLINIC | Age: 65
End: 2025-05-22

## 2025-05-22 NOTE — PROGRESS NOTES
Post procedure call completed    Bleeding: _____yes __X___no (pt denies)    Pain: _____yes ___X___no (pt reports some discomfort, took ES Tylenol before bed and used ice as instructed)    Redness/Swelling: ______yes ___X___no (pt denies)    Band aid removed: _____yes ___X__no (discussed removing when she showers)    Steri-Strips intact: ___X___yes _____no (discussed with patient to remove steri strips on Monday if they have not come off on their own)    Pt with no questions at this time, adv will call when results available, adv to call with any questions or concerns, has name/# for contact

## 2025-05-23 ENCOUNTER — TELEPHONE (OUTPATIENT)
Age: 65
End: 2025-05-23

## 2025-05-23 PROCEDURE — 88305 TISSUE EXAM BY PATHOLOGIST: CPT | Performed by: PATHOLOGY

## 2025-05-23 PROCEDURE — 88341 IMHCHEM/IMCYTCHM EA ADD ANTB: CPT | Performed by: PATHOLOGY

## 2025-05-23 PROCEDURE — 88360 TUMOR IMMUNOHISTOCHEM/MANUAL: CPT | Performed by: PATHOLOGY

## 2025-05-23 PROCEDURE — 88342 IMHCHEM/IMCYTCHM 1ST ANTB: CPT | Performed by: PATHOLOGY

## 2025-05-23 NOTE — TELEPHONE ENCOUNTER
Received call from [facility], patient has reviewed results from pathology and believes she has cancer. She would like to speak with someone who can review them with her.     Advised will forward to on call provider.   ESC sent to Dr. Combs  ESC reply- currently has full labor floor with multiple high risk patients.  If she has a stable moment to reach out to Anastasiia to discuss diagnosis she will. If she is unable to reach patient tonight, she will forward to Dr. Indira Llamas to contact her tomorrow.      Contacted Anastasiia to inform Dr Combs will try to reach her between laboring patients however if unable to due to circumstance beyond her control she will forward to DR Borges to follow up with Anastasiia tomorrow.    Anastasiia states she is aware this is cancer, asked if she will be managed by oncology or GYN.  Advised will be followed by oncology, breast health services typically will follow up with her as well to assist with navigating care.     Patient appreciative of return call and verbalized understanding.

## 2025-05-24 ENCOUNTER — TELEPHONE (OUTPATIENT)
Dept: LABOR AND DELIVERY | Facility: HOSPITAL | Age: 65
End: 2025-05-24

## 2025-05-24 NOTE — TELEPHONE ENCOUNTER
Patient called in regarding a missed call from a provider. Patient was advised of message left on chart by provider. Provider who attempted to call patient was contacted via secure chat; provider acknowledged message and stated will attempt to follow-up with patient.

## 2025-05-24 NOTE — TELEPHONE ENCOUNTER
Pt reached answering service, she is following up on call from last night, as per that msg I sent esc to Dr. Borges.

## 2025-05-24 NOTE — TELEPHONE ENCOUNTER
Courtesy call to patient as she has called 2 times to emergency line .  Tried calling patient . Unable to reach her  went to  voicemail   Voicemail  was  not left as this is  sensitive information. Can be followed up in office

## 2025-05-27 ENCOUNTER — TELEPHONE (OUTPATIENT)
Age: 65
End: 2025-05-27

## 2025-05-27 ENCOUNTER — DOCUMENTATION (OUTPATIENT)
Dept: HEMATOLOGY ONCOLOGY | Facility: CLINIC | Age: 65
End: 2025-05-27

## 2025-05-27 ENCOUNTER — TELEPHONE (OUTPATIENT)
Dept: MAMMOGRAPHY | Facility: CLINIC | Age: 65
End: 2025-05-27

## 2025-05-27 ENCOUNTER — TELEPHONE (OUTPATIENT)
Dept: GENETICS | Facility: CLINIC | Age: 65
End: 2025-05-27

## 2025-05-27 ENCOUNTER — APPOINTMENT (OUTPATIENT)
Dept: LAB | Facility: CLINIC | Age: 65
End: 2025-05-27
Payer: COMMERCIAL

## 2025-05-27 DIAGNOSIS — Z80.42 FAMILY HISTORY OF PROSTATE CANCER: ICD-10-CM

## 2025-05-27 DIAGNOSIS — C50.912 INVASIVE DUCTAL CARCINOMA OF BREAST, FEMALE, LEFT (HCC): Primary | ICD-10-CM

## 2025-05-27 DIAGNOSIS — Z80.3 FAMILY HISTORY OF BREAST CANCER: ICD-10-CM

## 2025-05-27 DIAGNOSIS — Z80.0 FAMILY HISTORY OF PANCREATIC CANCER: ICD-10-CM

## 2025-05-27 DIAGNOSIS — Z80.8 FAMILY HISTORY OF MELANOMA: ICD-10-CM

## 2025-05-27 DIAGNOSIS — C50.912 INVASIVE DUCTAL CARCINOMA OF BREAST, LEFT (HCC): Primary | ICD-10-CM

## 2025-05-27 PROCEDURE — 36415 COLL VENOUS BLD VENIPUNCTURE: CPT

## 2025-05-27 NOTE — TELEPHONE ENCOUNTER
[facility] Genetics - Newly Diagnosed Breast Cancer     ALL Newly Diagnosed Breast Cancer    Script for Genetics:    It is standard of care for individuals with breast cancer to undergo genetic testing.  Certain genetic test results may impact your breast surgeon's recommendations, so it is beneficial to initiate that testing before the first surgical appointment. We placed a referral to the genetics team. They will call you to discuss further.       Notes to Genetics Team (not required):  Patient is very anxious to get her genetic testing completed ASAP. She received her positive results on Friday afternoon through [employer] so has been anxious all weekend to get things started. She is concerned due to her ancestry of being Ashkenazi Scientologist.              Route to 'Oncology Genetics Breast Pool' in epic

## 2025-05-27 NOTE — TELEPHONE ENCOUNTER
Patient is scheduled to see Dr Bates on 6/4, per navigation's scheduling suggestions she should be seen in 7 days can someone assist me with this.

## 2025-05-27 NOTE — TELEPHONE ENCOUNTER
I introduced myself to Anastasiia and let her know that her nurse navigator reached out to the cancer risk and genetics program on her behalf to begin STAT genetic testing process.    I reviewed the following with Anastasiia:    While the majority of cancer occurs by chance, approximately 5-10% of breast cancer has an underlying genetic cause. Genetic testing is available which can determine if there is an underlying genetic cause to your cancer. Understanding if there is an underlying genetic cause can:  Provide your surgeon with additional information to help with surgical decisions (i.e. lumpectomy vs mastectomy), treatment decisions and eligibility for clinical trials.    It can determine if you have an increased risk for any additional cancers.  Help family members understand their cancer risk.       We work closely with the [facility] breast surgeons and are reaching out to see if you have interest in genetic testing. This test is not a requirement but can take 5-10 days to complete so we would like to start the process as soon as possible so the results are ready for your appointment with your surgeon.       If interested, family history will be collected to initiate STAT genetic testing process.        Patient elected to pursue testing     Diagnosis Details:  Invasive Ductal Carcinoma  Left  Hormone receptors pending    Personal History:  Do you have a personal history of any other cancer? No  If yes type/age of diagnosis:     Family history:   Do you have Ashkenazi Anglican ancestry? Yes  If yes, maternal, paternal, or both? Both    Do you have any children? Yes  How many sons? 2  How many daughters? 0  Do any of your children have a history of cancer? No  If yes type/age of diagnosis:     Do you have any brothers or sisters? Yes  How many brothers? 1  How many sisters? 0  Are they from the same parents? Yes  If no how maternal/paternal half-siblings:  Do any of your brothers or sisters have a history of cancer? No  If  yes who and the type/age of diagnosis:           Do you have nieces or nephews? Yes  Do any of them have a history of cancer? No  If yes type/age of diagnosis:    Does your mother have a history of cancer? No  If yes, cancer type and age of diagnosis:   Is your mother still living? No  Age/Age of death: 83    Thinking about your mother's family (aunts, uncles, cousins, grandparents) is there anyone with a history of cancer? No  If yes, list relationship, cancer type and age of diagnosis:    Does your father have a history of cancer? Yes  If yes, cancer type and age of diagnosis: Metastatic Melanoma age 86 (Leg)  Is your father still living? No  Age/age of death: 86    Thinking about your father's family (aunts, uncles, cousins, grandparents) is there anyone with a history of cancer? Yes  If yes, list relationship, cancer type and age of diagnosis:   Paternal Aunt - Lung Cancer age 82 ()  Paternal Cousin - Prostate Cancer age 60  Paternal first Cousin once removed - Prostate Cancer   Paternal first Cousin once removed - Colon Cancer   Paternal first Cousin once removed - Breast Cancer  Paternal first Cousin once removed - Breast Cancer  Paternal first Cousin once removed - Breast Cancer  Paternal first Cousin once removed - Breast Cancer  Paternal first Cousin once removed - Multiple Myeloma  Paternal first Cousin once removed - Pancreatic Cancer; Lymphoma     Types of Results  Positive Result - May explain personal diagnosis/family history. Can give surgeon information on treatment plan, inform future screening/management or tell a person about other possible risks. Positive results can initiate testing for other family members who may be at risk (children, siblings, etc)  Negative Result - Does not give an explanation. Surgical/treatment plan will be based on clinical presentation and will be part of discussion with surgeon. Negative result cannot be passed down to children, but they are still at elevated  risk.  Uncertain Result - Common, but treated like a negative result clinically. 90% are downgraded over time.     [facility] billing policy   Most individuals pay <$100 for hereditary cancer genetic testing. If insurance covers the cost of the testing, individuals may still pay out of pocket secondary to co-pays, co-insurance, or deductibles. If the cost of the testing exceeds $100, the lab will reach out to the patient via phone or e-mail. The patient will then have the option to proceed with the testing, cancel the testing, or elect the self-pay option of $250. Anastasiia verbalized understanding.    We have genetic counselors available, if you have any additional questions or would like to speak with them we can schedule you a 15 minute appointment.      Plan:  An order was placed and the patient was instructed to go to a [facility] lab for a blood collection    Genetic Testing Preformed: [facility] BRCAplus STAT Panel (13 genes): DIVINA, BARD1, BRCA1, BRCA2, CDH1, CHEK2, NF1, PALB2, PTEN, RAD51C, RAD51D, STK11, TP53 with reflex to [facility]: Cancer+RNAinsight (59 genes): APC, DIVINA, AXIN2, BAP1, BARD1, BMPR1A, BRCA1, BRCA2, BRIP1, CDH1, CDK4, CDKN1B, CDKN2A, CHEK2, CTNNA1, DICER1, EGLN1, EPCAM, FH, FLCN, GREM1, HOXB13, KIF1B, KIT, MAX, MEN1, MET, MITF, MLH1, MSH2, MSH3, MSH6, MUTYH, NF1, NTHL1, PALB2, PDGFRA PMS2, POLD1, POLE, POT1, PTEN, RAD51C, RAD51D, RB1, RET, SDHA, SDHAF2, SDHB, SDHC, SDHD, SMAD4, SMARCA4, STK11, UPOZ868, TP53, TSC1, TSC2, VHL     Initial results will take approximately 5-12 days to return     Additional results may take up to 2-3 weeks to complete once test is started    Result Call Information:    Patient agreeable to phone call only if results are positive/complex    In the event that we need to reach Anastasiia via telephone:  I confirmed the patient's mobile number on file as the best number to call with results  I confirmed with the patient that we can leave a voicemail on the provided  numbers    Patient does not have any further questions, declined meeting with genetic counselor    When your results are ready, your results will be available in your my chart. If you would like, someone from the genetics team will call you with any type of result- positive, negative or uncertain. Otherwise our team will only call to review significant or complex result. We will make your care team aware of your result.

## 2025-05-27 NOTE — TELEPHONE ENCOUNTER
Called patient and reviewed results and providers recommendations for breast MRI.  Phone number for scheduling provided.

## 2025-05-27 NOTE — TELEPHONE ENCOUNTER
Just a heads up cause I think breast center put order under you -- I called pt and reviewed results, need for consult, etc. She'll be setting up with Dr Bates's office.

## 2025-05-27 NOTE — PROGRESS NOTES
All records needed are in patients chart. No records retrieval needed at this time.    Pt referred to surg onc and should be scheduled within 7 days.  Please notify me if not scheduled within time frame.    Referral received/ Chart reviewed for work up completed     [facility]   [] PET/[facility]   [] MRI   [] CT   [x] US   [x] Mammo   [] Bone scan   [] N/A    Pathology completed:    Date:2025   Location: [facility]   []N/A    Additional records needed:    [] Genomic report   [] Genetic testing results   [] Office Note   [] Procedure/ Operative note   [] Lab results   [x] N/A      [] Radiation Oncology records retrieval needed (PN to route to rad/onc clerical pool once scheduled) N/A  Date:  Location:    Chart reviewed for prepping for initial outreach by nurse navigator.    Diagnosis: Left invasive ductal carcinoma, Grade 2, receptors pending    Recent Imagin2025- Bilateral ABUS  2025- Left breast ultrasound  2025- Left ultrasound guided breast biopsy      Recent Pathology: 2025 tissue exam- Left ultrasound guided breast biopsy    Previous Breast Cancer Diagnosis:  No    Does patient have a TUSHAR ? no a dragonfly    Is patient diabetic?  YES/NO: no  On medication?  no  Last A1C-  Date: N/A  Result: N/A    Is patient on a blood thinner?  no    Surgical Oncology: Dr. Bates     Medical Oncology: TBD    Radiation Oncology: TBD  Previous radiation treated noted?  no    Genetic testing: TBD    Family history of cancer: father had melanoma, paternal haunt had lung cancer    Any further needs: TBD      Information forwarded to Outbound PEP      Nurse Navigator will call patient for initial outreach.      Will have Patient Navigator outreach patient after surgical oncology consultation.  Any clinical questions to be directed to ONN or office nurse.

## 2025-05-27 NOTE — TELEPHONE ENCOUNTER
Please call pt to review above. Add't imaging recommended in anticipation of her consult with breast surgery. Needs MRI. Order placed, please give instructions for scheduling

## 2025-05-27 NOTE — TELEPHONE ENCOUNTER
Hi Dr. Tolbert,    The above patient had a left breast biopsy and the radiologist has some concerns and is recommending the patient be sent for a bilateral breast MRI. Our radiologist did discuss this with the patient. If you could please place an order for this testing for the patient and then contact the patient and let her know it was ordered it would be appreciated. The patient can then call central scheduling at [phone] to schedule her MRI.     If I can be of any further assistance please let me know.     Thank you,   Sujey Smith RN, BSN  Breast Nurse Navigator.

## 2025-05-29 ENCOUNTER — PATIENT OUTREACH (OUTPATIENT)
Dept: HEMATOLOGY ONCOLOGY | Facility: CLINIC | Age: 65
End: 2025-05-29

## 2025-05-29 NOTE — PROGRESS NOTES
Breast Oncology Nurse Navigator    Called patient for initial outreach from nurse navigator.  Introduced myself and my role as well as members of the navigation team.  Oncology Nurse Navigator will follow patient until they are seen by surgical oncology, after which the patient navigator will initiate outreach and follow the patient to survivorship.      Referral received from [facility]  Breast cancer diagnosis was made after pt had an abnormal ABUS, pt had a handheld ultrasound of the left breast, a left ultrasound guided breast biopsy was recommended, pathology of the left breast showed, Left IDC, Grade 2 ER/KS+, Her 2-  Patient states basic understanding/awareness of diagnosis    Does patient have a PCP?  yes  Confirm name. Gabino Givens MD  If no, referral placed to family medicine.  Insurance? yes Confirm insurance carrier: [employer]  Any other issues/diagnoses?  no.    Any implanted devices?  no  Is patient a current smoker: no never    Confirmed upcoming appointment with Dr. Bates, including date, time and location.  Patient will be accompanied by spouse  Provided brief explanation of what to expect at this visit.  Patient has transportation to appointments.    Patient lives with spouse  Support system includes: spouse  Referral placed to oncology social worker: no    Cancer family history includes: father had melanoma, paternal aunt had lung cancer  Referral to oncology genetics: yes 5/27    Does patient have a history of cancer? no  Has patient received Radiation Therapy in the past? No  Where did you receive RT? N/A   When? N/A  Does patient have any implantable devices?   [] Pacemaker  [] Pain stimulator  [] Defibrillator  [] Implanted sleep apnea device  [x] N/A    Was it placed at [facility]? No   If not, where was it placed?  N/A   When? N/A     Is patient pre/post menopausal?  post menopausal    Is patient taking hormone replacement therapy? Premarin cream If so, how long?  About 2 years total  Birth control?   N/A  Fertility issues? N/A    Discussed the Cancer Support Community and some of the programs and services offered, in person and virtually for patients and caregivers.  Discussed Breast Cancer Support group that meets monthly at [facility].  Discussed [facility] and some services offered.  Information sent via [facility].    Patient has my contact information and knows she can reach out with questions.  Office hours provided.  Patient provided with the phone number for [phone].  General assessment completed.  Patient does have [facility] set up   [facility] message sent with our team's contact information.

## 2025-06-02 LAB
GENE DIS ANL INTERP-IMP: NORMAL
GENES NOT REPORTED: NORMAL
INTERPRETATION: NORMAL

## 2025-06-03 ENCOUNTER — TELEPHONE (OUTPATIENT)
Dept: GENETICS | Facility: CLINIC | Age: 65
End: 2025-06-03

## 2025-06-03 ENCOUNTER — HOSPITAL ENCOUNTER (OUTPATIENT)
Dept: MRI IMAGING | Facility: HOSPITAL | Age: 65
Discharge: HOME | End: 2025-06-03
Attending: OBSTETRICS & GYNECOLOGY
Payer: COMMERCIAL

## 2025-06-03 DIAGNOSIS — C50.912 INVASIVE DUCTAL CARCINOMA OF BREAST, FEMALE, LEFT (HCC): ICD-10-CM

## 2025-06-03 PROCEDURE — C8937 CAD BREAST MRI: HCPCS

## 2025-06-03 PROCEDURE — 77049 MRI BREAST C-+ W/CAD BI: CPT

## 2025-06-03 PROCEDURE — A9585 GADOBUTROL INJECTION: HCPCS | Performed by: OBSTETRICS & GYNECOLOGY

## 2025-06-03 PROCEDURE — C8908 MRI W/O FOL W/CONT, BREAST,: HCPCS

## 2025-06-03 RX ORDER — GADOBUTROL 604.72 MG/ML
5 INJECTION INTRAVENOUS
Status: COMPLETED | OUTPATIENT
Start: 2025-06-03 | End: 2025-06-03

## 2025-06-03 RX ADMIN — GADOBUTROL 5 ML: 604.72 INJECTION INTRAVENOUS at 12:03

## 2025-06-03 NOTE — TELEPHONE ENCOUNTER
STAT Genetic Test Result    Summary:    I left a message with Anastasiia to review the result of her STAT genetic test.  I encouraged her to call our office back at [phone] to discuss this result further.      [facility] STAT Panel (13 genes): DIVINA, BARD1, BRCA1, BRCA2, CDH1, CHEK2, NF1, PALB2, PTEN, RAD51C, RAD51D, STK11, TP53     Result: Variant of Unknown Significance Detected (VUS) DIVINA [id number] ([id number])    Assessment:     Variant of uncertain significance (VUS)  A variant of uncertain significance (VUS) means that a change was identified in a specific gene but it cannot be determined whether the variant is associated with an increased risk of cancer or is a harmless genetic change.     It is possible that the variant was seen in only a handful of individuals, or there may be conflicting or incomplete information in the medical literature about the variant and its association with hereditary cancer.     The significance of the DIVINA variant is currently not known and therefore this test result cannot be used to help determine Anastasiia surgical plan, medical management or cancer risks.      At this time, the DIVINA VUS is not clinically actionable and is not recommended to be tested in other family members.    This result does not have surgical implications and surgical options should be discussed with her healthcare provider.        Additional Testing:  [facility]: Cancer+RNAinsight (59 genes): APC, DIVINA, AXIN2, BAP1, BARD1, BMPR1A, BRCA1, BRCA2, BRIP1, CDH1, CDK4, CDKN1B, CDKN2A, CHEK2, CTNNA1, DICER1, EGLN1, EPCAM, FH, FLCN, GREM1, HOXB13, KIF1B, KIT, MAX, MEN1, MET, MITF, MLH1, MSH2, MSH3, MSH6, MUTYH, NF1, NTHL1, PALB2, PDGFRA PMS2, POLD1, POLE, POT1, PTEN, RAD51C, RAD51D, RB1, RET, SDHA, SDHAF2, SDHB, SDHC, SDHD, SMAD4, SMARCA4, STK11, FNRZ855, TP53, TSC1, TSC2, VHL test is pending.  We will contact Anastasiia once results are available.  Additional recommendations for surveillance/medical  management will be made upon final genetic test result.

## 2025-06-04 ENCOUNTER — DOCUMENTATION (OUTPATIENT)
Dept: HEMATOLOGY ONCOLOGY | Facility: CLINIC | Age: 65
End: 2025-06-04

## 2025-06-04 ENCOUNTER — PATIENT OUTREACH (OUTPATIENT)
Dept: HEMATOLOGY ONCOLOGY | Facility: CLINIC | Age: 65
End: 2025-06-04

## 2025-06-04 ENCOUNTER — OFFICE VISIT (OUTPATIENT)
Dept: SURGICAL ONCOLOGY | Facility: CLINIC | Age: 65
End: 2025-06-04
Attending: OBSTETRICS & GYNECOLOGY
Payer: COMMERCIAL

## 2025-06-04 ENCOUNTER — DOCUMENTATION (OUTPATIENT)
Dept: OTHER | Facility: HOSPITAL | Age: 65
End: 2025-06-04

## 2025-06-04 VITALS
HEIGHT: 63 IN | TEMPERATURE: 97.7 F | BODY MASS INDEX: 20.9 KG/M2 | SYSTOLIC BLOOD PRESSURE: 134 MMHG | OXYGEN SATURATION: 97 % | DIASTOLIC BLOOD PRESSURE: 84 MMHG | HEART RATE: 76 BPM

## 2025-06-04 DIAGNOSIS — N64.9 BREAST VARIANT: ICD-10-CM

## 2025-06-04 DIAGNOSIS — Z17.0 MALIGNANT NEOPLASM OF UPPER-INNER QUADRANT OF LEFT BREAST IN FEMALE, ESTROGEN RECEPTOR POSITIVE (HCC): Primary | ICD-10-CM

## 2025-06-04 DIAGNOSIS — Z13.71 BRCA NEGATIVE: ICD-10-CM

## 2025-06-04 DIAGNOSIS — C50.912 INVASIVE DUCTAL CARCINOMA OF LEFT BREAST IN FEMALE (HCC): ICD-10-CM

## 2025-06-04 DIAGNOSIS — C50.212 MALIGNANT NEOPLASM OF UPPER-INNER QUADRANT OF LEFT BREAST IN FEMALE, ESTROGEN RECEPTOR POSITIVE (HCC): Primary | ICD-10-CM

## 2025-06-04 PROCEDURE — 99245 OFF/OP CONSLTJ NEW/EST HI 55: CPT | Performed by: SURGERY

## 2025-06-04 RX ORDER — TRAMADOL HYDROCHLORIDE 50 MG/1
50 TABLET ORAL EVERY 8 HOURS PRN
Qty: 9 TABLET | Refills: 0 | Status: SHIPPED | OUTPATIENT
Start: 2025-06-04

## 2025-06-04 RX ORDER — ACETAMINOPHEN 10 MG/ML
1000 INJECTION, SOLUTION INTRAVENOUS
OUTPATIENT
Start: 2025-06-04 | End: 2025-06-05

## 2025-06-04 NOTE — PROGRESS NOTES
Outreach made to patient regarding possible endocrine therapy medications that may be used after her breast cancer surgery as patient is trying to pick a prescription plan for Medicare and she will not see med onc until after her surgery. Aromasin, Arimidex and Femara are the ones given to patients that are most commonly seen for post menopausal patients after breast cancer surgery. Patient was appreciative of the call and has my contact info for any other needs.

## 2025-06-04 NOTE — PROGRESS NOTES
Name: Anastasiia Kumari      : 1960      MRN: [id number]  Encounter Provider: Areli Bates MD  Encounter Date: 2025   Encounter department: [facility] [place]  :  Assessment & Plan  Invasive ductal carcinoma of left breast in female (HCC)         Malignant neoplasm of upper-inner quadrant of left breast in female, estrogen receptor positive (HCC)    Orders:    US breast clip left; Future    Ambulatory Referral to Hematology / Oncology; Future    Ambulatory Referral to Radiation Oncology; Future    Case request operating room: LEFT BREAST TUSHAR-LOCALIZED LUMPECTOMY WITH BIOPSY LYMPH NODE SENTINEL; Standing    UA w Reflex to Microscopic w Reflex to Culture; Future    Comprehensive metabolic panel; Future    CBC and differential; Future    EKG 12 lead; Future    XR chest pa and lateral; Future    NM lymphatic breast; Future    traMADol (Ultram) 50 mg tablet; Take 1 tablet (50 mg total) by mouth every 8 (eight) hours as needed for severe pain    Mammo diagnostic bilateral w 3d and cad; Future    BRCA negative         Breast variant         64-year-old female who presents with newly diagnosed carcinoma of the left breast.  She was asymptomatic referable to the breast.  She was sent for screening ultrasound secondary to her underlying breast density.  This revealed a lesion on the left side.  Biopsy reveals a 2.5 mm focus of invasive carcinoma with background noninvasive papillary suggestive of encapsulated papillary.  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  She is a good candidate for breast conservation.  She would like to proceed in this fashion.  She has not had a mammogram since 2024.  I reached out to radiology who agree she should have an updated mammogram.  This can be done at the time of the Tushar insertion.  She understands that she will meet with both medical and radiation oncology in the postoperative setting.  Radiation therapy is  standard of care.  She will need at least adjuvant hormone therapy.  The MammaPrint was not able to be performed on the core biopsy given the small component of invasion.  All of her questions were answered.  Consent was signed today in the office.  She will be scheduled for surgery in the near term.      History of Present Illness   Anastasiia Kumari is a 64 y.o. year old female who presents with newly diagnosed carcinoma of the left breast.  She was asymptomatic to the breast.  She denies any family history of breast cancer.  She did have genetic testing which was a VUS in DIVINA but no mutations.  She had a screening ultrasound secondary to her breast density which detected this lesion.  Her last mammogram was in October 2024.     Oncology History   Cancer Staging   Malignant neoplasm of upper-inner quadrant of left breast in female, estrogen receptor positive (HCC)  Staging form: Breast, AJCC 8th Edition  - Clinical stage from 5/21/2025: Stage IA (cT1a, cN0, cM0, G2, ER+, TX+, HER2-) - Signed by Areli Bates MD on 6/4/2025  Stage prefix: Initial diagnosis  Method of lymph node assessment: Clinical  Histologic grading system: 3 grade system  Oncology History   Malignant neoplasm of upper-inner quadrant of left breast in female, estrogen receptor positive (HCC)   5/21/2025 Biopsy    Left US-guided bx    11:00 4cmfn  IDC  Grade 2  ER , TX 61-70, HER2 1+  12mm     5/21/2025 -  Cancer Staged    Staging form: Breast, AJCC 8th Edition  - Clinical stage from 5/21/2025: Stage IA (cT1a, cN0, cM0, G2, ER+, TX+, HER2-) - Signed by Areli Bates MD on 6/4/2025  Stage prefix: Initial diagnosis  Method of lymph node assessment: Clinical  Histologic grading system: 3 grade system       5/27/2025 Genetic Testing    [facility]     VUS of DIVINA     6/3/2025 Observation    B/L breast MRI    IMPRESSION:   1.  12 x 11 x 8 mm heterogeneously enhancing mass 11 o'clock position left breast in keeping with  biopsy-proven carcinoma.  No additional suspicious enhancement in the left breast.  No suspicious axillary or internal mammary lymph nodes.  2.  No suspicious enhancement in the right breast.  3.  Multiple T2 hyperintense lesions within the liver suggestive of cysts.  Further characterization with liver ultrasound could be considered as clinically warranted.        Review of Systems   Constitutional: Negative.  Negative for appetite change, fever and unexpected weight change.   HENT: Negative.  Negative for trouble swallowing.    Eyes: Negative.    Respiratory: Negative.  Negative for cough and shortness of breath.    Cardiovascular: Negative.  Negative for chest pain.   Gastrointestinal: Negative.  Negative for abdominal pain, nausea and vomiting.   Endocrine: Negative.    Genitourinary: Negative.  Negative for dysuria.   Musculoskeletal:  Positive for back pain. Negative for arthralgias and myalgias.   Skin: Negative.    Allergic/Immunologic: Negative.    Neurological: Negative.  Negative for headaches.   Hematological: Negative.  Negative for adenopathy. Does not bruise/bleed easily.   Psychiatric/Behavioral: Negative.      A complete review of systems is negative other than that noted above in the HPI.    Past Medical History   Past Medical History[1]  Past Surgical History[2]  Family History[3]   reports that she has never smoked. She has been exposed to tobacco smoke. She has never used smokeless tobacco. She reports current alcohol use of about 7.0 standard drinks of alcohol per week. She reports that she does not use drugs.  Current Outpatient Medications   Medication Instructions    fluticasone (FLONASE) 50 mcg/act nasal spray 1 spray, Nasal, Daily    Premarin 1 g, Vaginal, 2 times weekly    traMADol (ULTRAM) 50 mg, Oral, Every 8 hours PRN   Allergies[4]        Objective   /84 (BP Location: Left arm, Patient Position: Sitting, Cuff Size: Standard)   Pulse 76   Temp 97.7 °F (36.5 °C) (Temporal)   Ht  "5' 3\" (1.6 m)   SpO2 97%   BMI 20.90 kg/m²     Pain Screening:  Pain Score: 0-No pain  ECOG    Physical Exam  Constitutional:       General: She is not in acute distress.     Appearance: Normal appearance. She is well-developed.   HENT:      Head: Normocephalic and atraumatic.     Cardiovascular:      Heart sounds: Normal heart sounds.   Pulmonary:      Breath sounds: Normal breath sounds.   Chest:   Breasts:     Right: No swelling, bleeding, inverted nipple, mass, nipple discharge, skin change or tenderness.      Left: Skin change (Resolving ecchymosis from recent biopsy) present. No swelling, bleeding, inverted nipple, mass, nipple discharge or tenderness.   Abdominal:      Palpations: Abdomen is soft.     Musculoskeletal:      Right lower leg: No edema.      Left lower leg: No edema.   Lymphadenopathy:      Upper Body:      Right upper body: No supraclavicular, axillary or pectoral adenopathy.      Left upper body: No supraclavicular, axillary or pectoral adenopathy.     Neurological:      Mental Status: She is alert and oriented to person, place, and time.     Psychiatric:         Mood and Affect: Mood normal.          Labs: I have reviewed pertinent labs.     5/21/2025 core biopsy left breast 11:00 4 cm from the nipple showed a 2.5 mm component of invasive ductal carcinoma grade 2 with background noninvasive papillary carcinoma suggestive encapsulated papillary, %, CA 70%, HER2 was 1+    5/27/2025 genetic testing shows a variant of uncertain significance in DIVINA    Appointment on 05/27/2025   Component Date Value Ref Range Status    GENETIC ANALYSIS OVERALL INTERPRET* 05/27/2025 Variant of Unknown Significance Detected   Final    INTERPRETATION 05/27/2025 See Notes   Final    No known clinically actionable alterations were detected.  One variant of unknown significance was detected in the DIVINA gene.  Risk Estimate: should be based on clinical and family history, as the clinical significance of this result " is unknown.  Genetic counseling is a recommended option for all individuals undergoing genetic testing.  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 Refer to the supplementary pages for additional information on this variant. No additional pathogenic mutations, variants of unknown significance, or gross deletions or duplications were detected. Genes Analyzed (13 total): DIVINA, BARD1, BRCA1, BRCA2, CDH1, CHEK2, NF1, PALB2, PTEN, RAD51C, RAD51D, STK11 and TP53 (sequencing and deletion/duplication).    GENES NOT REPORTED 05/27/2025 BARD1,CDH1,CHEK2,PALB2,PTEN,RAD51C,RAD51D,STK11,TP53,NF1,BRCA1,BRCA2   Final   Hospital Outpatient Visit on 05/21/2025   Component Date Value Ref Range Status    Case Report 05/21/2025    Final                    Value:Surgical Pathology Report                         Case: [id number]                                  Authorizing Provider:  Elsi Vann MD               Collected:           05/21/2025 1441              Ordering Location:     [facility]       Received:            05/21/2025 1608                                     Ultrasound                                                                   Pathologist:           Leeanne Faustin MD                                                                    Specimen:    Breast, Left, US BX, LEFT BREAST, 11:00 4CMFN, 4 PASSES 12G MARVAZQUEZ                        Addendum 05/21/2025    Final                    Value:Please order MammaPrint test on excision specimen if clinically indicated. The focus of invasion on biopsy is small and it might be difficult to perform the test. The MammPrint test is not ordered at this time on the biopsy material          Please see CAP checklist for receptor study results.     NOTE:  Patients with breast cancers that are HER2 IHC 3+ or IHC 2+/JOHANNA amplified may be eligible for  several therapies that disrupt HER2 signaling pathways. Invasive breast cancers that test 'HER2-negative' (IHC 0, 1+ or 2+/JOHANNA not-amplified) are more specifically considered 'HER2-negative for protein overexpression/gene amplification' since non-overexpressed levels of the HER2 protein may be present in these cases. Patients with breast cancers that are HER2 IHC 1+ or IHC 2+/JOHANNA not-amplified may be eligible for a treatment that targets non-amplified/non-overexpressed levels of HER2 expression for cytotoxic drug delivery (IHC 0 results do not result in eligibility currently).    Reference:                             Human Epidermal Growth Factor Receptor 2 Testing in Breast Cancer: ASCO-College of American Pathologists Guideline Update Kamaljit Cline, Gabino Schuster, ELIZABETH Bernabe, Bethel Elliott, Melisa Castellanos, Viktor Mckeon, Prerna Dang, and Monica Neil. Journal of Clinical Oncology 2023 41:22, [id number].         Final Diagnosis 05/21/2025    Final                    Value:A. Left breast, 11:00 4 cm from nipple, 4 passes 12G marquee, ultrasound-guided core needle biopsy:  -   Invasive ductal carcinoma, histologic grade 2, 2.5 mm in greatest extent, involving one fragmented core.  -   Background non-invasive papillary carcinoma with features suggestive of encapsulated papillary carcinoma.  -   Breast prognostic markers (ER, PA, HER2) are pending.  -   See comment and synoptic report.    Comment: Immunohistochemical stains on A2 show the invasive carcinoma is positive for CK AE1/AE3, GATA3, CK7 (patchy), E-cadherin (membranous staining) and p120 catenin (membranous staining), and are negative for p63 and CK5/6.  Immunohistochemical stains on A1 shows the non-invasive papillary carcinoma is positive for ER, and focally positive for p63 and CK5/6.    Select slides (A1-5, IHC, A2-5, IHC) are reviewed by Dr. Rafi Sagastume who concurs with the  diagnosis.    Diagnosis is communicated via secure chat to [facility] Breast Nurse Navigators on                           5/23/2025.    Interpretation performed at [facility]  [address] [id number]       Note 05/21/2025    Final                    Value:BEST TUMOR BLOCK(S) FOR FUTURE STUDIES:  A2      Additional Information 05/21/2025    Final                    Value:All reported additional testing was performed with appropriately reactive controls.  These tests were developed and their performance characteristics determined by [facility] or appropriate performing facility, though some tests may be performed on tissues which have not been validated for performance characteristics (such as staining performed on alcohol exposed cell blocks and decalcified tissues).  Results should be interpreted with caution and in the context of the patients’ clinical condition. These tests may not be cleared or approved by the U.S. Food and Drug Administration, though the FDA has determined that such clearance or approval is not necessary. These tests are used for clinical purposes and they should not be regarded as investigational or for research. This laboratory has been approved by CLIA 88, designated as a high-complexity laboratory and is qualified to perform these tests.  .      Synoptic Checklist 05/21/2025    Corrected                    Value:INVASIVE CARCINOMA OF THE BREAST: Biopsy                            INVASIVE CARCINOMA OF THE BREAST: BIOPSY - All Specimens                            Protocol posted: 3/22/2023                                                        SPECIMEN                               Procedure:    Needle biopsy                               Specimen Laterality:    Left                                                        TUMOR                               Tumor Site:    Clock position                               :    11 o'clock                              Tumor Site:    Distance from nipple (Centimeters): 4 cm                             Histologic Type:    Invasive carcinoma of no special type (ductal)                             Histologic Grade ([place] Histologic Score):                                   Glandular (Acinar) / Tubular Differentiation:    Score 3                               Nuclear Pleomorphism:    Score 2                               Mitotic Rate:    Score 1                               Overall Grade:    Grade 2 (scores of 6 or 7)                             Largest Invasive Focus in this Limited Biopsy Sample:    2.5 mm                             Ductal Carcinoma In Situ (DCIS):    Not identified                             Lymphatic and / or Vascular Invasion:    Not identified                             Microcalcifications:    Present in invasive carcinoma                                                        ADDITIONAL FINDINGS                             Additional Findings:    Non-invasive papillary carcinoma with features suggestive of encapsulated papillary carcinoma; apocrine metaplasia                                                         Breast Biomarker Studies (pending):    Breast prognostic markers (ER, PA, HER2) are pending                            Breast Biomarker Reporting Template                            (Added in Addendum) BREAST BIOMARKER REPORTING TEMPLATE - A                            Protocol posted: 12/13/2023                                                           Test(s) Performed:                                     Estrogen Receptor (ER) Status:    Positive (greater than 10% of cells demonstrate nuclear positivity)                                   Percentage of Cells with Nuclear Positivity:    %                                   Average Intensity of Staining:    Strong                                 Test Type:    Food and Drug Administration (FDA) cleared (test / vendor): CONFIRM  "anti-Estrogen Receptor (ER)/[employer]                                 Primary Antibody:    SP1                               Test(s) Performed:                                     Progesterone Receptor (PgR) Status:    Positive                                   Percentage of Cells with Nuclear Positivity:    61-70%                                   Average Intensity of Staining:    Strong                                 Test Type:    Food and Drug Administration (FDA) cleared (test / vendor): CONFIRM anti-Progesterone Receptor ([place])/[employer]                                 Primary Antibody:    1E2                               Test(s) Performed:                                     HER2 by Immunohistochemistry:    Negative (Score 1+)                                 Test Type:    Food and Drug Administration (FDA) cleared (test / vendor): [employer] anti-HER-2/donta (4B5)/[employer]                                 Primary Antibody:    4B5                               Cold Ischemia and Fixation Times:    Meet requirements specified in latest version of the ASCO / CAP Guidelines                               Cold Ischemia Time (minutes):    7 min                               Fixation Time (hours):    8 hours                                                        METHODS                               Fixative:    Formalin                               Image Analysis:    Not performed      Gross Description 05/21/2025    Final                    Value:A. The specimen is received in formalin, labeled with the patient's name and hospital number, and is designated \" left breast ultrasound biopsy 1100, 4 cm from nipple 4 passes 12-gauge marquee\".  The specimen consists of 4 tan-yellow rubbery and fatty tissue cores measuring less than 0.1-0.3 cm in diameter and ranging from 1.5-2.0 cm in length.  The specimen is entirely submitted between sponges, 2 cassettes.    Note: The estimated total formalin fixation time based " upon information provided by the submitting clinician and the standard processing schedule is 8 hours.  The cold ischemia time based upon information provided by the submitting clinician and receiving staff in the laboratory is within 7 minute.       -Kia Villar      Clinical Information 05/21/2025    Final                    Value:HYPOECHOIC MASS    Per US breast left limited (diagnostic)  INDICATION: Anastasiia Kumari is a 64 y.o. female presenting for   abnormal mammogram.     FINDINGS:   LEFT  1) MASS [A]: There is a 12 mm x 7 mm x 14 mm oval, parallel, hypoechoic mass with indistinct margins seen in the left breast at 11 o'clock in the posterior depth, 4 cm from the nipple.       Pathology: I have reviewed pathology reports described above.    Radiology Results Review: I personally reviewed the following image studies in PACS and associated radiology reports: 10/28/2024 bilateral 3D screening mammogram, 5/14/2025 automated breast ultrasound, 5/21/2025 left breast ultrasound and postbiopsy mammogram, 6/3/2025 bilateral breast MRI. My interpretation of the radiology images/reports is: Mammogram was benign with dense tissue, mass seen on the screening ultrasound with correlate on diagnostic imaging measuring 12 mm, postbiopsy mammogram is concordant with standard clip, no additional areas of concern on MRI.  Concordance: yes           [1]   Past Medical History:  Diagnosis Date    Miscarriage 1988   [2]   Past Surgical History:  Procedure Laterality Date    BREAST BIOPSY      DILATION AND CURETTAGE OF UTERUS  1987    miscarriage    US GUIDED BREAST BIOPSY LEFT COMPLETE Left 05/21/2025   [3]   Family History  Problem Relation Name Age of Onset    Atrial fibrillation Mother leigha     Heart disease Mother leigha     Hypertension Mother leigha     Melanoma Father  86    No Known Problems Maternal Grandmother fancis     No Known Problems Maternal Grandfather      No Known Problems Paternal Grandmother iesha     No  Known Problems Paternal Grandfather      Lung cancer Paternal Aunt shiva 82    Heart disease Family      Hypertension Family     [4] No Known Allergies

## 2025-06-04 NOTE — PROGRESS NOTES
Referral to rad onc received from Dr. Bates.  Chart reviewed by oncology nurse navigator at this time.      Diagnosis: breast cancer  After review of chart, instructions for scheduling added to referral and sent to be scheduled as advised.

## 2025-06-04 NOTE — ASSESSMENT & PLAN NOTE
Orders:    US breast clip left; Future    Ambulatory Referral to Hematology / Oncology; Future    Ambulatory Referral to Radiation Oncology; Future    Case request operating room: LEFT BREAST TUSHAR-LOCALIZED LUMPECTOMY WITH BIOPSY LYMPH NODE SENTINEL; Standing    UA w Reflex to Microscopic w Reflex to Culture; Future    Comprehensive metabolic panel; Future    CBC and differential; Future    EKG 12 lead; Future    XR chest pa and lateral; Future    NM lymphatic breast; Future    traMADol (Ultram) 50 mg tablet; Take 1 tablet (50 mg total) by mouth every 8 (eight) hours as needed for severe pain    Mammo diagnostic bilateral w 3d and cad; Future

## 2025-06-04 NOTE — PROGRESS NOTES
[facility]  Documentation of Informed Consent      I, Breanna Carolina, Clinical Research Coordinator, met with Anastasiia Kumari and her  on Date: 6/4/2025 at Time: 10:30 to conduct the informed consent process for enrollment into Research Study: [employer] 2021-05 . Breanna Carolina, Anastasiia ALLEN Kumari, and her  was present during the consent discussion.   The current IRB approved informed consent form was reviewed in its entirety. Anastasiia Kumari  was given sufficient time to review the study information and to ask questions, and all questions were answered to the satisfaction of Anastasiia Kumari. The patient was sent home with a blank informed consent form for her review. Patient was interested in the trial, but upon review of her eligibility, was not eligible at this time due to MRI contrast <24 hrs prior to our meeting. Patient eligibility was explained to patient, and she is agreeable to sign the consent at another time, prior to when other outpatient labs may be drawn so that these can be collected with the study labs as well. Patient had no further questions at this time and was given my contact information so that we may coordinate.

## 2025-06-04 NOTE — PROGRESS NOTES
Breast Oncology Patient Navigator    Met with patient at her Consult appointment with .    Introduced my self to patient as her patient navigator, explained my role.  Mentioned to patient that I will be following up with her as I have a few questions to go over with her.    Offered support and answered questions when appropriate.  Patient was given my business card, Patient has my contact information and knows to reach out with further questions or concerns.

## 2025-06-05 ENCOUNTER — TELEPHONE (OUTPATIENT)
Dept: SURGICAL ONCOLOGY | Facility: CLINIC | Age: 65
End: 2025-06-05

## 2025-06-05 ENCOUNTER — PATIENT OUTREACH (OUTPATIENT)
Dept: HEMATOLOGY ONCOLOGY | Facility: CLINIC | Age: 65
End: 2025-06-05

## 2025-06-05 NOTE — PROGRESS NOTES
Called and LVM for outreach/DT- mentioned that I am her patient navigator and I was calling to check in with patient. Stated that I wanted to address any questions,concerns or any barriers to care she may have at this time. Mentioned that I do have some questions for her and that should I not hear back from her ok, that I would be reaching out to her again tomorrow or sometime early next week. Mentioned that I was calling from my direct line. Stated that if patient did have any questions or concerns she could give me a call #[phone].

## 2025-06-05 NOTE — TELEPHONE ENCOUNTER
Patient called in with questions regarding timing of juliette placement and mammogram pre op. States she received call from the [facility] but doesn't remember when she should schedule them. I explained that the juliette needs to go in no later that 72 hour prior to surgery and the nurses know the surgery date and will most like schedule both tests on the same day. She also inquired as to wether she can request her rad on and med onc consults location. I explained that yes she may request the location and the scheduling team will do their best to accommodate her. She was appreciative of the information and I encourage he rot call with any other questions.

## 2025-06-06 ENCOUNTER — PATIENT OUTREACH (OUTPATIENT)
Dept: HEMATOLOGY ONCOLOGY | Facility: CLINIC | Age: 65
End: 2025-06-06

## 2025-06-06 LAB
GENE DIS ANL INTERP-IMP: NORMAL
INTERPRETATION: NORMAL

## 2025-06-06 NOTE — PROGRESS NOTES
Called and LVM for outreach/DT- mentioned that I am her patient navigator, I was calling to follow up with patient after her recent consult appointment with . stated that I wanted to address any questions,concerns or any barriers to care she may have at this time. Stated that should she have any questions or concerns to please feel free to reach out to me directly. Mentioned that I do have questions for her as well and I apologized we keep missing each other. Mentioned that should not I hear from patient today that is OK I would be reaching out to her again on Monday 6/9.

## 2025-06-06 NOTE — PROGRESS NOTES
I spoke with Anastasiia regarding scheduling her left breast US Isabel Clip. I explained the procedure and pre/post procedure instructions. Anastasiia verbalized understanding and is scheduled at [facility] CV 6/27/25 at 1430.

## 2025-06-09 ENCOUNTER — TELEPHONE (OUTPATIENT)
Dept: OTHER | Facility: HOSPITAL | Age: 65
End: 2025-06-09

## 2025-06-09 ENCOUNTER — RESULTS FOLLOW-UP (OUTPATIENT)
Dept: GENETICS | Facility: CLINIC | Age: 65
End: 2025-06-09

## 2025-06-09 ENCOUNTER — PATIENT OUTREACH (OUTPATIENT)
Dept: HEMATOLOGY ONCOLOGY | Facility: CLINIC | Age: 65
End: 2025-06-09

## 2025-06-09 NOTE — TELEPHONE ENCOUNTER
Clinical Research Coordinator left  with request for contact at the pt's earliest convenience regarding [employer] clinical trial interest and timeline for pt to obtain pre-op labs.

## 2025-06-10 ENCOUNTER — PATIENT OUTREACH (OUTPATIENT)
Dept: CASE MANAGEMENT | Facility: OTHER | Age: 65
End: 2025-06-10

## 2025-06-10 ENCOUNTER — HOSPITAL ENCOUNTER (OUTPATIENT)
Dept: RADIOLOGY | Facility: HOSPITAL | Age: 65
Discharge: HOME | End: 2025-06-10
Payer: COMMERCIAL

## 2025-06-10 ENCOUNTER — APPOINTMENT (OUTPATIENT)
Dept: LAB | Facility: HOSPITAL | Age: 65
End: 2025-06-10
Payer: COMMERCIAL

## 2025-06-10 ENCOUNTER — PATIENT OUTREACH (OUTPATIENT)
Dept: HEMATOLOGY ONCOLOGY | Facility: CLINIC | Age: 65
End: 2025-06-10

## 2025-06-10 DIAGNOSIS — C50.212 MALIGNANT NEOPLASM OF UPPER-INNER QUADRANT OF LEFT BREAST IN FEMALE, ESTROGEN RECEPTOR POSITIVE (HCC): ICD-10-CM

## 2025-06-10 DIAGNOSIS — Z17.0 MALIGNANT NEOPLASM OF UPPER-INNER QUADRANT OF LEFT BREAST IN FEMALE, ESTROGEN RECEPTOR POSITIVE (HCC): Primary | ICD-10-CM

## 2025-06-10 DIAGNOSIS — Z17.0 MALIGNANT NEOPLASM OF UPPER-INNER QUADRANT OF LEFT BREAST IN FEMALE, ESTROGEN RECEPTOR POSITIVE (HCC): ICD-10-CM

## 2025-06-10 DIAGNOSIS — C50.212 MALIGNANT NEOPLASM OF UPPER-INNER QUADRANT OF LEFT BREAST IN FEMALE, ESTROGEN RECEPTOR POSITIVE (HCC): Primary | ICD-10-CM

## 2025-06-10 LAB
ALBUMIN SERPL BCG-MCNC: 4.8 G/DL (ref 3.5–5)
ALP SERPL-CCNC: 36 U/L (ref 34–104)
ALT SERPL W P-5'-P-CCNC: 21 U/L (ref 7–52)
ANION GAP SERPL CALCULATED.3IONS-SCNC: 7 MMOL/L (ref 4–13)
AST SERPL W P-5'-P-CCNC: 21 U/L (ref 13–39)
ATRIAL RATE: 63 BPM
BASOPHILS # BLD AUTO: 0.03 THOUSANDS/ÂΜL (ref 0–0.1)
BASOPHILS NFR BLD AUTO: 1 % (ref 0–1)
BILIRUB SERPL-MCNC: 0.95 MG/DL (ref 0.2–1)
BILIRUB UR QL STRIP: NEGATIVE
BUN SERPL-MCNC: 13 MG/DL (ref 5–25)
CALCIUM SERPL-MCNC: 10.1 MG/DL (ref 8.4–10.2)
CHLORIDE SERPL-SCNC: 100 MMOL/L (ref 96–108)
CLARITY UR: CLEAR
CO2 SERPL-SCNC: 29 MMOL/L (ref 21–32)
COLOR UR: COLORLESS
CREAT SERPL-MCNC: 0.68 MG/DL (ref 0.6–1.3)
EOSINOPHIL # BLD AUTO: 0.06 THOUSAND/ÂΜL (ref 0–0.61)
EOSINOPHIL NFR BLD AUTO: 1 % (ref 0–6)
ERYTHROCYTE [DISTWIDTH] IN BLOOD BY AUTOMATED COUNT: 12.6 % (ref 11.6–15.1)
GFR SERPL CREATININE-BSD FRML MDRD: 92 ML/MIN/1.73SQ M
GLUCOSE SERPL-MCNC: 106 MG/DL (ref 65–140)
GLUCOSE UR STRIP-MCNC: NEGATIVE MG/DL
HCT VFR BLD AUTO: 44.7 % (ref 34.8–46.1)
HGB BLD-MCNC: 15.2 G/DL (ref 11.5–15.4)
HGB UR QL STRIP.AUTO: NEGATIVE
IMM GRANULOCYTES # BLD AUTO: 0.01 THOUSAND/UL (ref 0–0.2)
IMM GRANULOCYTES NFR BLD AUTO: 0 % (ref 0–2)
KETONES UR STRIP-MCNC: NEGATIVE MG/DL
LEUKOCYTE ESTERASE UR QL STRIP: NEGATIVE
LYMPHOCYTES # BLD AUTO: 1.32 THOUSANDS/ÂΜL (ref 0.6–4.47)
LYMPHOCYTES NFR BLD AUTO: 31 % (ref 14–44)
MCH RBC QN AUTO: 33.1 PG (ref 26.8–34.3)
MCHC RBC AUTO-ENTMCNC: 34 G/DL (ref 31.4–37.4)
MCV RBC AUTO: 97 FL (ref 82–98)
MONOCYTES # BLD AUTO: 0.33 THOUSAND/ÂΜL (ref 0.17–1.22)
MONOCYTES NFR BLD AUTO: 8 % (ref 4–12)
NEUTROPHILS # BLD AUTO: 2.46 THOUSANDS/ÂΜL (ref 1.85–7.62)
NEUTS SEG NFR BLD AUTO: 59 % (ref 43–75)
NITRITE UR QL STRIP: NEGATIVE
NRBC BLD AUTO-RTO: 0 /100 WBCS
P AXIS: 41 DEGREES
PH UR STRIP.AUTO: 6.5 [PH]
PLATELET # BLD AUTO: 222 THOUSANDS/UL (ref 149–390)
PMV BLD AUTO: 11 FL (ref 8.9–12.7)
POTASSIUM SERPL-SCNC: 4 MMOL/L (ref 3.5–5.3)
PR INTERVAL: 140 MS
PROT SERPL-MCNC: 7.3 G/DL (ref 6.4–8.4)
PROT UR STRIP-MCNC: NEGATIVE MG/DL
QRS AXIS: 77 DEGREES
QRSD INTERVAL: 76 MS
QT INTERVAL: 408 MS
QTC INTERVAL: 418 MS
RBC # BLD AUTO: 4.59 MILLION/UL (ref 3.81–5.12)
SODIUM SERPL-SCNC: 136 MMOL/L (ref 135–147)
SP GR UR STRIP.AUTO: 1 (ref 1–1.03)
T WAVE AXIS: 42 DEGREES
UROBILINOGEN UR STRIP-ACNC: <2 MG/DL
VENTRICULAR RATE: 63 BPM
WBC # BLD AUTO: 4.21 THOUSAND/UL (ref 4.31–10.16)

## 2025-06-10 PROCEDURE — 71046 X-RAY EXAM CHEST 2 VIEWS: CPT

## 2025-06-10 PROCEDURE — 36415 COLL VENOUS BLD VENIPUNCTURE: CPT

## 2025-06-10 PROCEDURE — 80053 COMPREHEN METABOLIC PANEL: CPT

## 2025-06-10 PROCEDURE — 93005 ELECTROCARDIOGRAM TRACING: CPT

## 2025-06-10 PROCEDURE — 85025 COMPLETE CBC W/AUTO DIFF WBC: CPT

## 2025-06-10 PROCEDURE — 93010 ELECTROCARDIOGRAM REPORT: CPT

## 2025-06-10 PROCEDURE — 81003 URINALYSIS AUTO W/O SCOPE: CPT

## 2025-06-10 NOTE — PROGRESS NOTES
Called and LVM for outreach/DT- I apologized that we keep missing each others phone calls.  Stated that I am her patient navigator and I was calling to check in with her after her consult appointment with . stated that I wanted to address any questions,concerns or any barriers to care she may have at this time. Stated that should I not hear from patient today that is OK I would attempt to reach her again tomorrow or later this week. Mentioned that should she attempt to call me today that I would try my best to be available to take her call. Stated that this is my direct line #[phone].

## 2025-06-10 NOTE — PROGRESS NOTES
I reached out and spoke with Anastasiia,  She has been seen in consult by Surgical Oncology. I introduced myself and explained my role as their Patient Navigator. I reviewed for any barriers to care and offered referrals to supportive services as needed. I reviewed and updated the members assigned to the care team in [facility]. She knows the members of the care team as well as how and when to contact them with any needs.     Distress Thermometer completed at this time. Patient scored 7/10. Referral to  placed.    Patient additionally only answered YES to one of the questions on her DT:  -Worry/Anxiety (due to her diagnosis)    I was finally able to speak with patient after we kept missing each other's phone calls.  Patient was very polite and answered all of my questions.    Patient mentioned that she has yet to cry yet regarding her diagnosis as of yet.  She's anticipating that it will happen soon.    Anastasiia mentions that she has a great support system at home with family,friends and her adult children.  Patient expressed that she experienced a loss of her infant child about 40 years ago.  She feels as though she was able to get through that- that she can get through anything.    Patient mentioned wanting to know of a good sports bra for after surgery- she mentioned that she went to couple of stores yesterday to check some things out. Anastasiia even mentioned looking on amaz[employer] already. She just wants to be comfortable, anastasiia asked if I knew of a specific bra that other patient's might have mentioned. I apologized and let her know not yet and should someone recommend something specific I would make her aware.     Anastasiia seems to have a good attitude about her diagnosis although she is anxious about her surgery and what the future holds.    Patient did not have any additional questions or concerns at this time.  Anastasiia is aware that should she have any questions or concerns in the future to please reach out to me directly.    She  is currently able to drive and denies any transportation needs.    Patient is still driving but mentioned that her  has been taking her to and from all of her appointments currently.    She denies any uncontrolled symptoms. Discussed role of Palliative Care in symptom and side effect management. Declined referral at this time.    She states that she is eating and drinking as per usual with no unintentional weight loss.     Anastasiia mentioned that her  likes to cook-and since her diagnosis he has been stress cooking and she mentioned which makes her stress eat. However she mentioned her  is now on a diet which means they will be eating healthier from now on.     Completed MST and patient Met criteria for referral to [facility] and agreeable to referral    Patient does not smoke.     She states she is well supported by family and friends.      Going to be sending patient my contact information and cancer support community information via my chart.    She feels she has adequate insurance coverage and denies any financial concerns at this time.     She verbalizes managing the schedules well.   Future Appointments   Date Time Provider Department Center   6/27/2025  2:00 PM BE MAMMO RBC 1 BE RBC Mammo BE RBC   6/27/2025  2:30 PM BE US RBC 1 BE RBC US BE RBC   7/8/2025 10:00 AM [facility] NM 2 [facility]   7/21/2025 11:30 AM Areli Bates MD SURG ONC [facility] Practice-Onc   7/22/2025 11:00 AM Kailey Soler DO HEM ONC [facility]On[facility]   7/22/2025  1:00 PM Nika Chadwick MD UB Rad Onc [facility]   9/8/2025  1:15 PM Gabino Givens MD [facility]        Based on individual needs I will follow up in about 4/5 weeks. I have provided my direct contact information and welcome them to contact me if needs as discussed above change. She was appreciative for the call.

## 2025-06-16 ENCOUNTER — PATIENT OUTREACH (OUTPATIENT)
Dept: HEMATOLOGY ONCOLOGY | Facility: CLINIC | Age: 65
End: 2025-06-16

## 2025-06-16 ENCOUNTER — PATIENT OUTREACH (OUTPATIENT)
Dept: CASE MANAGEMENT | Facility: OTHER | Age: 65
End: 2025-06-16

## 2025-06-16 NOTE — PROGRESS NOTES
I returned patient's voicemail message from Friday 6/13, I left my name and contact information for patient to call back.

## 2025-06-26 ENCOUNTER — PATIENT OUTREACH (OUTPATIENT)
Dept: CASE MANAGEMENT | Facility: OTHER | Age: 65
End: 2025-06-26

## 2025-06-27 ENCOUNTER — HOSPITAL ENCOUNTER (OUTPATIENT)
Dept: MAMMOGRAPHY | Facility: CLINIC | Age: 65
Discharge: HOME | End: 2025-06-27
Payer: COMMERCIAL

## 2025-06-27 ENCOUNTER — HOSPITAL ENCOUNTER (OUTPATIENT)
Dept: MAMMOGRAPHY | Facility: CLINIC | Age: 65
Discharge: HOME | End: 2025-06-27
Attending: SURGERY
Payer: COMMERCIAL

## 2025-06-27 ENCOUNTER — HOSPITAL ENCOUNTER (OUTPATIENT)
Dept: ULTRASOUND IMAGING | Facility: CLINIC | Age: 65
Discharge: HOME | End: 2025-06-27
Attending: SURGERY
Payer: COMMERCIAL

## 2025-06-27 VITALS — HEART RATE: 61 BPM | SYSTOLIC BLOOD PRESSURE: 183 MMHG | DIASTOLIC BLOOD PRESSURE: 84 MMHG

## 2025-06-27 DIAGNOSIS — Z17.0 MALIGNANT NEOPLASM OF UPPER-INNER QUADRANT OF LEFT BREAST IN FEMALE, ESTROGEN RECEPTOR POSITIVE (HCC): ICD-10-CM

## 2025-06-27 DIAGNOSIS — C50.212 MALIGNANT NEOPLASM OF UPPER-INNER QUADRANT OF LEFT BREAST IN FEMALE, ESTROGEN RECEPTOR POSITIVE (HCC): ICD-10-CM

## 2025-06-27 PROCEDURE — 77066 DX MAMMO INCL CAD BI: CPT

## 2025-06-27 PROCEDURE — 19285 PERQ DEV BREAST 1ST US IMAG: CPT

## 2025-06-27 PROCEDURE — G0279 TOMOSYNTHESIS, MAMMO: HCPCS

## 2025-06-27 RX ORDER — LIDOCAINE HYDROCHLORIDE 10 MG/ML
5 INJECTION, SOLUTION EPIDURAL; INFILTRATION; INTRACAUDAL; PERINEURAL ONCE
Status: COMPLETED | OUTPATIENT
Start: 2025-06-27 | End: 2025-06-27

## 2025-06-27 RX ADMIN — LIDOCAINE HYDROCHLORIDE 5 ML: 10 INJECTION, SOLUTION EPIDURAL; INFILTRATION; INTRACAUDAL; PERINEURAL at 15:16

## 2025-06-27 NOTE — PROGRESS NOTES
Procedure type: Clip Placement    __x___ultrasound guided _____stereotactic    Breast:    __x___Left _____Right    Location: 11 o'clock 4 cfmn    Needle:16G    # of passes:1    Clip:Isabel    Performed by:Dr. Bryant    Pressure held for 2 minutes by: Jaimee Leos Strips:    _____yes __x___no    Band aid:    __X___yes_____no    Tolerated procedure:    __X___yes _____no

## 2025-07-01 NOTE — PROGRESS NOTES
Post procedure call completed, patient reports    Bleeding: _____yes __X___no    Pain: _____yes ___X___no    Redness/Swelling: ______yes ___X___no    Band aid removed: __x___yes ___X__no (discussed removing when she showers)

## 2025-07-02 RX ORDER — DIPHENOXYLATE HYDROCHLORIDE AND ATROPINE SULFATE 2.5; .025 MG/1; MG/1
1 TABLET ORAL DAILY
COMMUNITY

## 2025-07-02 NOTE — PRE-PROCEDURE INSTRUCTIONS
Pre-Surgery Instructions:   Medication Instructions    acetaminophen (TYLENOL) 325 mg suppository Uses PRN- OK to take day of surgery    fluticasone (FLONASE) 50 mcg/act nasal spray Take day of surgery.    traMADol (Ultram) 50 mg tablet Ordered for post op      Medication instructions for day of surgery reviewed. Please take all instructed medications with only a sip of water. Please do not take any over the counter (non-prescribed) vitamins or supplements for one week prior to date of surgery.      You will receive a call one business day prior to surgery with an arrival time and hospital directions. If your surgery is scheduled on a Monday, the hospital will be calling you on the Friday prior to your surgery. If you have not heard from anyone by 8pm, please call the hospital supervisor through the hospital  at [phone]. ([place] [phone] or [place] [phone]).    Do not eat or drink anything after midnight the night before your surgery, including candy, mints, lifesavers, or chewing gum. Do not drink alcohol 24hrs before your surgery. Try not to smoke at least 24hrs before your surgery.       Follow the pre surgery showering instructions as listed in the “My Surgical Experience Booklet” or otherwise provided by your surgeon's office. Do not use a blade to shave the surgical area 1 week before surgery. It is okay to use a clean electric clippers up to 24 hours before surgery. Do not apply any lotions, creams, including makeup, cologne, deodorant, or perfumes after showering on the day of your surgery. Do not use dry shampoo, hair spray, hair gel, or any type of hair products.     No contact lenses, eye make-up, or artificial eyelashes. Remove nail polish, including gel polish, and any artificial, gel, or acrylic nails if possible. Remove all jewelry including rings and body piercing jewelry.     Wear causal clothing that is easy to take on and off. Consider your type of surgery.    Keep any  valuables, jewelry, piercings at home. Please bring any specially ordered equipment (sling, braces) if indicated.    Arrange for a responsible person to drive you to and from the hospital on the day of your surgery. Please confirm the visitor policy for the day of your procedure when you receive your phone call with an arrival time.     Call the surgeon's office with any new illnesses, exposures, or additional questions prior to surgery.    Please reference your “My Surgical Experience Booklet” for additional information to prepare for your upcoming surgery.    [address] SAME DAY SURGERY UNIT: [phone]    PREADMISSION TESTING DEPT: [phone]

## 2025-07-05 ENCOUNTER — ANESTHESIA EVENT (OUTPATIENT)
Dept: PERIOP | Facility: HOSPITAL | Age: 65
End: 2025-07-05
Payer: MEDICARE

## 2025-07-08 ENCOUNTER — HOSPITAL ENCOUNTER (OUTPATIENT)
Dept: NUCLEAR MEDICINE | Facility: HOSPITAL | Age: 65
Discharge: HOME | End: 2025-07-08
Attending: SURGERY
Payer: MEDICARE

## 2025-07-08 ENCOUNTER — TELEPHONE (OUTPATIENT)
Age: 65
End: 2025-07-08

## 2025-07-08 ENCOUNTER — HOSPITAL ENCOUNTER (OUTPATIENT)
Dept: MAMMOGRAPHY | Facility: HOSPITAL | Age: 65
Discharge: HOME | End: 2025-07-08
Payer: MEDICARE

## 2025-07-08 ENCOUNTER — ANESTHESIA (OUTPATIENT)
Dept: PERIOP | Facility: HOSPITAL | Age: 65
End: 2025-07-08
Payer: MEDICARE

## 2025-07-08 ENCOUNTER — HOSPITAL ENCOUNTER (OUTPATIENT)
Facility: HOSPITAL | Age: 65
Setting detail: OUTPATIENT SURGERY
Discharge: HOME | End: 2025-07-08
Attending: SURGERY | Admitting: SURGERY
Payer: MEDICARE

## 2025-07-08 VITALS
HEIGHT: 63 IN | TEMPERATURE: 98.4 F | SYSTOLIC BLOOD PRESSURE: 140 MMHG | OXYGEN SATURATION: 96 % | DIASTOLIC BLOOD PRESSURE: 89 MMHG | WEIGHT: 117.95 LBS | HEART RATE: 73 BPM | RESPIRATION RATE: 18 BRPM | BODY MASS INDEX: 20.9 KG/M2

## 2025-07-08 DIAGNOSIS — Z17.0 MALIGNANT NEOPLASM OF UPPER-INNER QUADRANT OF LEFT BREAST IN FEMALE, ESTROGEN RECEPTOR POSITIVE (HCC): ICD-10-CM

## 2025-07-08 DIAGNOSIS — C50.212 MALIGNANT NEOPLASM OF UPPER-INNER QUADRANT OF LEFT BREAST IN FEMALE, ESTROGEN RECEPTOR POSITIVE (HCC): ICD-10-CM

## 2025-07-08 PROCEDURE — A9541 TC99M SULFUR COLLOID: HCPCS

## 2025-07-08 PROCEDURE — 38900 IO MAP OF SENT LYMPH NODE: CPT | Performed by: SURGERY

## 2025-07-08 PROCEDURE — 88344 IMHCHEM/IMCYTCHM EA MLT ANTB: CPT | Performed by: PATHOLOGY

## 2025-07-08 PROCEDURE — NC001 PR NO CHARGE: Performed by: SURGERY

## 2025-07-08 PROCEDURE — 88307 TISSUE EXAM BY PATHOLOGIST: CPT | Performed by: PATHOLOGY

## 2025-07-08 PROCEDURE — 38525 BIOPSY/REMOVAL LYMPH NODES: CPT | Performed by: SURGERY

## 2025-07-08 PROCEDURE — 78195 LYMPH SYSTEM IMAGING: CPT

## 2025-07-08 PROCEDURE — 88342 IMHCHEM/IMCYTCHM 1ST ANTB: CPT | Performed by: PATHOLOGY

## 2025-07-08 PROCEDURE — 19301 PARTIAL MASTECTOMY: CPT | Performed by: SURGERY

## 2025-07-08 PROCEDURE — 88341 IMHCHEM/IMCYTCHM EA ADD ANTB: CPT | Performed by: PATHOLOGY

## 2025-07-08 RX ORDER — KETOROLAC TROMETHAMINE 30 MG/ML
INJECTION, SOLUTION INTRAMUSCULAR; INTRAVENOUS AS NEEDED
Status: DISCONTINUED | OUTPATIENT
Start: 2025-07-08 | End: 2025-07-08

## 2025-07-08 RX ORDER — FENTANYL CITRATE 50 UG/ML
INJECTION, SOLUTION INTRAMUSCULAR; INTRAVENOUS AS NEEDED
Status: DISCONTINUED | OUTPATIENT
Start: 2025-07-08 | End: 2025-07-08

## 2025-07-08 RX ORDER — LIDOCAINE HYDROCHLORIDE 20 MG/ML
INJECTION, SOLUTION EPIDURAL; INFILTRATION; INTRACAUDAL; PERINEURAL AS NEEDED
Status: DISCONTINUED | OUTPATIENT
Start: 2025-07-08 | End: 2025-07-08

## 2025-07-08 RX ORDER — ONDANSETRON 2 MG/ML
4 INJECTION INTRAMUSCULAR; INTRAVENOUS ONCE AS NEEDED
Status: DISCONTINUED | OUTPATIENT
Start: 2025-07-08 | End: 2025-07-08 | Stop reason: HOSPADM

## 2025-07-08 RX ORDER — SODIUM CHLORIDE, SODIUM LACTATE, POTASSIUM CHLORIDE, CALCIUM CHLORIDE 600; 310; 30; 20 MG/100ML; MG/100ML; MG/100ML; MG/100ML
125 INJECTION, SOLUTION INTRAVENOUS CONTINUOUS
Status: DISCONTINUED | OUTPATIENT
Start: 2025-07-08 | End: 2025-07-08 | Stop reason: HOSPADM

## 2025-07-08 RX ORDER — ONDANSETRON 2 MG/ML
INJECTION INTRAMUSCULAR; INTRAVENOUS AS NEEDED
Status: DISCONTINUED | OUTPATIENT
Start: 2025-07-08 | End: 2025-07-08

## 2025-07-08 RX ORDER — EPHEDRINE SULFATE 50 MG/ML
INJECTION INTRAVENOUS AS NEEDED
Status: DISCONTINUED | OUTPATIENT
Start: 2025-07-08 | End: 2025-07-08

## 2025-07-08 RX ORDER — DEXAMETHASONE SODIUM PHOSPHATE 10 MG/ML
INJECTION, SOLUTION INTRAMUSCULAR; INTRAVENOUS AS NEEDED
Status: DISCONTINUED | OUTPATIENT
Start: 2025-07-08 | End: 2025-07-08

## 2025-07-08 RX ORDER — HYDROMORPHONE HCL/PF 1 MG/ML
0.5 SYRINGE (ML) INJECTION
Status: DISCONTINUED | OUTPATIENT
Start: 2025-07-08 | End: 2025-07-08 | Stop reason: HOSPADM

## 2025-07-08 RX ORDER — FENTANYL CITRATE/PF 50 MCG/ML
25 SYRINGE (ML) INJECTION
Status: DISCONTINUED | OUTPATIENT
Start: 2025-07-08 | End: 2025-07-08 | Stop reason: HOSPADM

## 2025-07-08 RX ORDER — TRAMADOL HYDROCHLORIDE 50 MG/1
50 TABLET ORAL EVERY 6 HOURS PRN
Status: DISCONTINUED | OUTPATIENT
Start: 2025-07-08 | End: 2025-07-08 | Stop reason: HOSPADM

## 2025-07-08 RX ORDER — BUPIVACAINE HYDROCHLORIDE 5 MG/ML
INJECTION, SOLUTION EPIDURAL; INTRACAUDAL; PERINEURAL AS NEEDED
Status: DISCONTINUED | OUTPATIENT
Start: 2025-07-08 | End: 2025-07-08 | Stop reason: HOSPADM

## 2025-07-08 RX ORDER — MEPERIDINE HYDROCHLORIDE 25 MG/ML
12.5 INJECTION INTRAMUSCULAR; INTRAVENOUS; SUBCUTANEOUS
Status: DISCONTINUED | OUTPATIENT
Start: 2025-07-08 | End: 2025-07-08 | Stop reason: HOSPADM

## 2025-07-08 RX ORDER — PROPOFOL 10 MG/ML
INJECTION, EMULSION INTRAVENOUS AS NEEDED
Status: DISCONTINUED | OUTPATIENT
Start: 2025-07-08 | End: 2025-07-08

## 2025-07-08 RX ORDER — CEFAZOLIN SODIUM 2 G/50ML
2000 SOLUTION INTRAVENOUS ONCE
Status: COMPLETED | OUTPATIENT
Start: 2025-07-08 | End: 2025-07-08

## 2025-07-08 RX ORDER — ACETAMINOPHEN 10 MG/ML
1000 INJECTION, SOLUTION INTRAVENOUS
Status: COMPLETED | OUTPATIENT
Start: 2025-07-08 | End: 2025-07-08

## 2025-07-08 RX ORDER — MIDAZOLAM HYDROCHLORIDE 2 MG/2ML
INJECTION, SOLUTION INTRAMUSCULAR; INTRAVENOUS AS NEEDED
Status: DISCONTINUED | OUTPATIENT
Start: 2025-07-08 | End: 2025-07-08

## 2025-07-08 RX ADMIN — DEXAMETHASONE SODIUM PHOSPHATE 10 MG: 10 INJECTION, SOLUTION INTRAMUSCULAR; INTRAVENOUS at 11:34

## 2025-07-08 RX ADMIN — SODIUM CHLORIDE, SODIUM LACTATE, POTASSIUM CHLORIDE, AND CALCIUM CHLORIDE 125 ML/HR: .6; .31; .03; .02 INJECTION, SOLUTION INTRAVENOUS at 09:09

## 2025-07-08 RX ADMIN — ONDANSETRON 4 MG: 2 INJECTION INTRAMUSCULAR; INTRAVENOUS at 12:58

## 2025-07-08 RX ADMIN — KETOROLAC TROMETHAMINE 15 MG: 30 INJECTION, SOLUTION INTRAMUSCULAR at 12:47

## 2025-07-08 RX ADMIN — ACETAMINOPHEN 1000 MG: 10 INJECTION INTRAVENOUS at 09:09

## 2025-07-08 RX ADMIN — MIDAZOLAM 2 MG: 1 INJECTION INTRAMUSCULAR; INTRAVENOUS at 11:26

## 2025-07-08 RX ADMIN — LIDOCAINE HYDROCHLORIDE 100 MG: 20 INJECTION, SOLUTION EPIDURAL; INFILTRATION; INTRACAUDAL at 11:29

## 2025-07-08 RX ADMIN — EPHEDRINE SULFATE 5 MG: 50 INJECTION INTRAVENOUS at 12:12

## 2025-07-08 RX ADMIN — FENTANYL CITRATE 50 MCG: 50 INJECTION INTRAMUSCULAR; INTRAVENOUS at 11:26

## 2025-07-08 RX ADMIN — PROPOFOL 200 MG: 10 INJECTION, EMULSION INTRAVENOUS at 11:29

## 2025-07-08 RX ADMIN — CEFAZOLIN SODIUM 2000 MG: 2 SOLUTION INTRAVENOUS at 11:31

## 2025-07-08 RX ADMIN — SODIUM CHLORIDE, SODIUM LACTATE, POTASSIUM CHLORIDE, AND CALCIUM CHLORIDE: .6; .31; .03; .02 INJECTION, SOLUTION INTRAVENOUS at 12:48

## 2025-07-08 RX ADMIN — FENTANYL CITRATE 25 MCG: 50 INJECTION INTRAMUSCULAR; INTRAVENOUS at 11:44

## 2025-07-08 RX ADMIN — FENTANYL CITRATE 25 MCG: 50 INJECTION INTRAMUSCULAR; INTRAVENOUS at 12:47

## 2025-07-08 NOTE — ANESTHESIA POSTPROCEDURE EVALUATION
Post-Op Assessment Note    CV Status:  Stable  Pain Score: 0    Pain management: adequate       Mental Status:  Sleepy and arousable   Hydration Status:  Stable   PONV Controlled:  None   Airway Patency:  Patent     Post Op Vitals Reviewed: Yes    No anethesia notable event occurred.    Staff: CRNA           Last Filed PACU Vitals:  Vitals Value Taken Time   Temp     Pulse     /76 07/08/25 13:19   Resp     SpO2 95 % 07/08/25 13:20   Vitals shown include unfiled device data.

## 2025-07-08 NOTE — OP NOTE
OPERATIVE REPORT  PATIENT NAME: Anastasiia Kumari    :  1960  MRN: [id number]  Pt Location: [facility] OR ROOM 05    SURGERY DATE: 2025    Surgeons and Role:     * Areli Bates MD - Primary     * Estelita Zamarripa MD - Assisting    Preop Diagnosis:  Malignant neoplasm of upper-inner quadrant of left breast in female, estrogen receptor positive (HCC) [C50.212, Z17.0]    Post-Op Diagnosis Codes:     * Malignant neoplasm of upper-inner quadrant of left breast in female, estrogen receptor positive (HCC) [C50.212, Z17.0]    Procedure(s):  Left - LEFT BREAST JULIETTE-LOCALIZED LUMPECTOMY WITH BIOPSY LYMPH NODE SENTINEL & LYMPHATIC MAPPING; 1000 nuc med  Injection of blue dye  Use of gamma probe  Use of juliette   Specimen radiograph    Specimen(s):  ID Type Source Tests Collected by Time Destination   1 : LEFT Lumpectomy, Short Superior, Long Lateral Tissue Breast, Left TISSUE EXAM Areli Bates MD 2025 1207    2 : new medial margin left breast- stitch marks true margin Tissue Breast, Left TISSUE EXAM Areli Bates MD 2025 1216    3 : new superior margin left breast- stitch marks true margin Tissue Breast, Left TISSUE EXAM Areli Bates MD 2025 1217    4 : new inferior margin left breast- stitch marks true margin Tissue Breast, Left TISSUE EXAM Areli Bates MD 2025 1217    5 : new posterior margin left breast- stitch marks true margin Tissue Breast, Left TISSUE EXAM Areli Bates MD 2025 1218    6 : left axilla sentinel lymph node #1 Tissue Axillary lymph node TISSUE EXAM Areli Bates MD 2025 1232    7 : left axilla sentinel lymph node #2 Tissue Axillary TISSUE EXAM Areli Bates MD 2025 1232        Estimated Blood Loss:   Minimal    Drains:  * No LDAs found *    Anesthesia Type:   General    Operative Indications:  Malignant neoplasm of upper-inner quadrant of left breast in female, estrogen receptor positive (HCC) [C50.212, Z17.0]      Operative Findings:  Juliette reflector and dragonfly clip in  specimen       Complications:   None    Procedure and Technique:  Anastasiia is a 64-year-old female presents with left breast carcinoma.  She was counseled on a ISABEL localized lumpectomy with lymphatic mapping and sentinel node biopsy.  She presented the day of surgery to the radiology suite and underwent lymphoscintigraphy of the left breast.  From there she went to the operating room.  She had preoperative antibiotics.  She was administered general anesthesia.  She had a 5 cc injection of Lymphazurin into the left subareolar plexus.  She was prepped and draped in the usual standard fashion.  Timeout was performed.  Attention was turned to the upper inner left breast.  The Iasbel reflector was identified using the  probe.  The skin was marked in this location.  Half percent Marcaine plain was injected for local anesthesia.  Elliptical incision was created through the skin and subcutaneous tissue.  Electrocautery was used to dissect margins superior, medial, inferior, lateral and posterior.  The savvy probe was used throughout to help guide dissection.  The specimen was marked with a short stitch superior and a long stitch lateral.  This was interrogated to confirm reflector removal.  It was also imaged in the operating room revealing both the Isabel reflector and dragonfly clip.  The closest margins were superior, medial, inferior and posterior.  Therefore new margins were excised in these locations and sutures were placed on the true margins.  All breast specimens were submitted to pathology in formalin.  The posterior extent was taken down to the pectoralis muscle.  Attention was then turned to the left axilla.  There was an area of increased radioactive uptake at the lateral pectoralis edge.  Additional half percent Marcaine plain was injected for local anesthesia.  An incision was created through the skin and subcutaneous tissue.  Electrocautery was used to dissect through the remaining subcutaneous tissue and  clavipectoral fascia to enter the axillary fat pad.  Deep in the mid axilla was a blue lymphatic channel tracing to a blue stained and radioactive node.  This was elevated into the wound using an Allis clamp.  Hemoclips were placed on superficial draining vessels.  The node was excised.  There appeared to be a small adjacent lymph node.  The nodes were divided and submitted as sentinel node 1 and 2 of the left axilla.  Following removal of these nodes, there were no additional blue stained, radioactive or palpable nodes.  Both of her wounds were irrigated and hemostasis was achieved.  Hemoclips were placed within the lumpectomy bed.  1 piece of Surgicel gauze was placed within the axilla for additional hemostasis.  The lumpectomy cavity was reapproximated using a 3-0 Vicryl suture to close dead space.  Both of the wounds were then closed using multiple interrupted 3-0 Monocryl suture and a running 4-0 Monocryl subcuticular stitch.  All counts were correct.  The skin was cleaned and dried.  Surgical glue, fluffs and a bra were applied.  The patient was then extubated and taken to recovery in stable condition.       Patient Disposition:  extubated and stable    [place] Node Biopsy for Breast Cancer - Left  Operation performed with curative intent. Yes   Tracer(s) used to identify sentinel nodes in the upfront surgery (non-neoadjuvant) setting (select all that apply). Dye and Radioactive tracer   Tracer(s) used to identify sentinel nodes in the neoadjuvant setting (select all that apply). N/A   All nodes (colored or non-colored) present at the end of a dye-filled lymphatic channel were removed. Yes   All significantly radioactive nodes were removed. Yes   All palpably suspicious nodes were removed. N/A   Biopsy-proven positive nodes marked with clips prior to chemotherapy were identified and removed. N/A             SIGNATURE: Areli Bates MD  DATE: July 8, 2025  TIME: 12:49 PM

## 2025-07-08 NOTE — ANESTHESIA POSTPROCEDURE EVALUATION
Post-Op Assessment Note    CV Status:  Stable    Pain management: adequate       Mental Status:  Alert and awake   Hydration Status:  Euvolemic   PONV Controlled:  Controlled   Airway Patency:  Patent  Two or more mitigation strategies used for obstructive sleep apnea   Post Op Vitals Reviewed: Yes    No anethesia notable event occurred.    Staff: Anesthesiologist           Last Filed PACU Vitals:  Vitals Value Taken Time   Temp 98.2 °F (36.8 °C) 07/08/25 13:50   Pulse 68 07/08/25 13:59   /82 07/08/25 13:50   Resp 16 07/08/25 13:50   SpO2 97 % 07/08/25 13:59   Vitals shown include unfiled device data.    Modified Ezekiel:     Vitals Value Taken Time   Activity 2 07/08/25 13:50   Respiration 2 07/08/25 13:50   Circulation 2 07/08/25 13:50   Consciousness 2 07/08/25 13:50   Oxygen Saturation 2 07/08/25 13:50     Modified Ezekiel Score: 10

## 2025-07-08 NOTE — ANESTHESIA PREPROCEDURE EVALUATION
Procedure:  LEFT BREAST TUSHAR-LOCALIZED LUMPECTOMY WITH BIOPSY LYMPH NODE SENTINEL & LYMPHATIC MAPPING; 1000 nuc med (Left: Breast)    Relevant Problems   CARDIO   (+) Raynaud's syndrome without gangrene      GYN   (+) Malignant neoplasm of upper-inner quadrant of left breast in female, estrogen receptor positive (HCC)        Physical Exam    Airway     Mallampati score: II  TM Distance: <3 FB        Cardiovascular  Rhythm: regular, Rate: normalCardiovascular exam normal    Dental   No notable dental hx     Pulmonary  Pulmonary exam normal     Neurological  - normal exam  She appears awake, alert and oriented x3.      Other Findings  post-pubertal.      Anesthesia Plan  ASA Score- 2     Anesthesia Type- general with ASA Monitors.         Additional Monitors:     Airway Plan: LMA and LMA.           Plan Factors-Exercise tolerance (METS): >4 METS.    Chart reviewed. EKG reviewed.  Existing labs reviewed. Patient summary reviewed.                  Induction- intravenous.    Postoperative Plan- Plan for postoperative opioid use.   Monitoring Plan - Monitoring plan - standard ASA monitoring  Post Operative Pain Plan - non-opiod analgesics, plan for postoperative opioid use and multimodal analgesia        Informed Consent- Anesthetic plan and risks discussed with patient and spouse.        NPO Status:  No vitals data found for the desired time range.

## 2025-07-08 NOTE — H&P
Name: Anastasiia Kumari      : 1960      MRN: [id number]  Encounter Provider: No name on file  Encounter Date: 2025   Encounter department: [facility] OPERATING ROOM  :  Assessment & Plan    64-year-old female who presents with newly diagnosed carcinoma of the left breast.  She was asymptomatic referable to the breast.  She was sent for screening ultrasound secondary to her underlying breast density.  This revealed a lesion on the left side.  Biopsy reveals a 2.5 mm focus of invasive carcinoma with background noninvasive papillary suggestive of encapsulated papillary.  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  She is a good candidate for breast conservation.  She would like to proceed in this fashion.  She has not had a mammogram since 2024.  I reached out to radiology who agree she should have an updated mammogram.  This can be done at the time of the Isabel insertion.  She understands that she will meet with both medical and radiation oncology in the postoperative setting.  Radiation therapy is standard of care.  She will need at least adjuvant hormone therapy.  The MammaPrint was not able to be performed on the core biopsy given the small component of invasion.  All of her questions were answered.  Consent was signed today in the office.  She will be scheduled for surgery in the near term.      History of Present Illness   Anastasiia Kumari is a 64 y.o. year old female who presents with newly diagnosed carcinoma of the left breast.  She was asymptomatic to the breast.  She denies any family history of breast cancer.  She did have genetic testing which was a VUS in DIVINA but no mutations.  She had a screening ultrasound secondary to her breast density which detected this lesion.  Her last mammogram was in 2024.     Oncology History   Cancer Staging   Malignant neoplasm of upper-inner quadrant of left breast in female, estrogen receptor positive  (HCC)  Staging form: Breast, AJCC 8th Edition  - Clinical stage from 5/21/2025: Stage IA (cT1a, cN0, cM0, G2, ER+, SD+, HER2-) - Signed by Areli Bates MD on 6/4/2025  Stage prefix: Initial diagnosis  Method of lymph node assessment: Clinical  Histologic grading system: 3 grade system  Oncology History   Malignant neoplasm of upper-inner quadrant of left breast in female, estrogen receptor positive (HCC)   5/21/2025 Biopsy    Left US-guided bx    11:00 4cmfn  IDC  Grade 2  ER , SD 61-70, HER2 1+  12mm     5/21/2025 -  Cancer Staged    Staging form: Breast, AJCC 8th Edition  - Clinical stage from 5/21/2025: Stage IA (cT1a, cN0, cM0, G2, ER+, SD+, HER2-) - Signed by Areli Bates MD on 6/4/2025  Stage prefix: Initial diagnosis  Method of lymph node assessment: Clinical  Histologic grading system: 3 grade system       5/27/2025 Genetic Testing    [facility]     VUS of DIVINA     6/3/2025 Observation    B/L breast MRI    IMPRESSION:   1.  12 x 11 x 8 mm heterogeneously enhancing mass 11 o'clock position left breast in keeping with biopsy-proven carcinoma.  No additional suspicious enhancement in the left breast.  No suspicious axillary or internal mammary lymph nodes.  2.  No suspicious enhancement in the right breast.  3.  Multiple T2 hyperintense lesions within the liver suggestive of cysts.  Further characterization with liver ultrasound could be considered as clinically warranted.        Review of Systems   Constitutional: Negative.  Negative for appetite change, fever and unexpected weight change.   HENT: Negative.  Negative for trouble swallowing.    Eyes: Negative.    Respiratory: Negative.  Negative for cough and shortness of breath.    Cardiovascular: Negative.  Negative for chest pain.   Gastrointestinal: Negative.  Negative for abdominal pain, nausea and vomiting.   Endocrine: Negative.    Genitourinary: Negative.  Negative for dysuria.   Musculoskeletal:  Positive for back pain.  "Negative for arthralgias and myalgias.   Skin: Negative.    Allergic/Immunologic: Negative.    Neurological: Negative.  Negative for headaches.   Hematological: Negative.  Negative for adenopathy. Does not bruise/bleed easily.   Psychiatric/Behavioral: Negative.      A complete review of systems is negative other than that noted above in the HPI.    Past Medical History   Past Medical History[1]  Past Surgical History[2]  Family History[3]   reports that she has never smoked. She has been exposed to tobacco smoke. She has never used smokeless tobacco. She reports current alcohol use of about 7.0 standard drinks of alcohol per week. She reports that she does not use drugs.  Current Outpatient Medications   Medication Instructions    fluticasone (FLONASE) 50 mcg/act nasal spray 1 spray, Nasal, Daily    multivitamin (THERAGRAN) TABS 1 tablet, Daily    Premarin 1 g, Vaginal, 2 times weekly    traMADol (ULTRAM) 50 mg, Oral, Every 8 hours PRN   Allergies[4]        Objective   /79 Comment: Right arm  Pulse 70   Temp 98.5 °F (36.9 °C) (Temporal)   Resp 16   Ht 5' 3\" (1.6 m)   Wt 53.5 kg (117 lb 15.1 oz)   SpO2 100%   BMI 20.89 kg/m²     Pain Screening:     ECOG    Physical Exam  Constitutional:       General: She is not in acute distress.     Appearance: Normal appearance. She is well-developed.   HENT:      Head: Normocephalic and atraumatic.     Cardiovascular:      Heart sounds: Normal heart sounds.   Pulmonary:      Breath sounds: Normal breath sounds.   Chest:   Breasts:     Right: No swelling, bleeding, inverted nipple, mass, nipple discharge, skin change or tenderness.      Left: Skin change (Resolving ecchymosis from recent biopsy) present. No swelling, bleeding, inverted nipple, mass, nipple discharge or tenderness.   Abdominal:      Palpations: Abdomen is soft.     Musculoskeletal:      Right lower leg: No edema.      Left lower leg: No edema.   Lymphadenopathy:      Upper Body:      Right upper body: " No supraclavicular, axillary or pectoral adenopathy.      Left upper body: No supraclavicular, axillary or pectoral adenopathy.     Neurological:      Mental Status: She is alert and oriented to person, place, and time.     Psychiatric:         Mood and Affect: Mood normal.          Labs: I have reviewed pertinent labs.     5/21/2025 core biopsy left breast 11:00 4 cm from the nipple showed a 2.5 mm component of invasive ductal carcinoma grade 2 with background noninvasive papillary carcinoma suggestive encapsulated papillary, %, MT 70%, HER2 was 1+    5/27/2025 genetic testing shows a variant of uncertain significance in DIVINA    Appointment on 06/10/2025   Component Date Value Ref Range Status    Ventricular Rate 06/10/2025 63  BPM Final    Atrial Rate 06/10/2025 63  BPM Final    MT Interval 06/10/2025 140  ms Final    QRSD Interval 06/10/2025 76  ms Final    QT Interval 06/10/2025 408  ms Final    QTC Interval 06/10/2025 418  ms Final    P Axis 06/10/2025 41  degrees Final    QRS [place] 06/10/2025 77  degrees Final    T Wave Axis 06/10/2025 42  degrees Final   Appointment on 06/10/2025   Component Date Value Ref Range Status    Color, UA 06/10/2025 Colorless   Final    Clarity, UA 06/10/2025 Clear   Final    Specific Gravity, UA 06/10/2025 1.002 (L)  1.003 - 1.030 Final    pH, UA 06/10/2025 6.5  4.5, 5.0, 5.5, 6.0, 6.5, 7.0, 7.5, 8.0 Final    Leukocytes, UA 06/10/2025 Negative  Negative Final    Nitrite, UA 06/10/2025 Negative  Negative Final    Protein, UA 06/10/2025 Negative  Negative mg/dl Final    Glucose, UA 06/10/2025 Negative  Negative mg/dl Final    Ketones, UA 06/10/2025 Negative  Negative mg/dl Final    Urobilinogen, UA 06/10/2025 <2.0  <2.0 mg/dl mg/dl Final    Bilirubin, UA 06/10/2025 Negative  Negative Final    Occult Blood, UA 06/10/2025 Negative  Negative Final    Sodium 06/10/2025 136  135 - 147 mmol/L Final    Potassium 06/10/2025 4.0  3.5 - 5.3 mmol/L Final    Chloride 06/10/2025 100  96 - 108  mmol/L Final    CO2 06/10/2025 29  21 - 32 mmol/L Final    ANION GAP 06/10/2025 7  4 - 13 mmol/L Final    BUN 06/10/2025 13  5 - 25 mg/dL Final    Creatinine 06/10/2025 0.68  0.60 - 1.30 mg/dL Final    Standardized to IDMS reference method    Glucose 06/10/2025 106  65 - 140 mg/dL Final    If the patient is fasting, the ADA then defines impaired fasting glucose as > 100 mg/dL and diabetes as > or equal to 123 mg/dL.    Calcium 06/10/2025 10.1  8.4 - 10.2 mg/dL Final    AST 06/10/2025 21  13 - 39 U/L Final    ALT 06/10/2025 21  7 - 52 U/L Final    Specimen collection should occur prior to Sulfasalazine administration due to the potential for falsely depressed results.     Alkaline Phosphatase 06/10/2025 36  34 - 104 U/L Final    Total Protein 06/10/2025 7.3  6.4 - 8.4 g/dL Final    Albumin 06/10/2025 4.8  3.5 - 5.0 g/dL Final    Total Bilirubin 06/10/2025 0.95  0.20 - 1.00 mg/dL Final    Use of this assay is not recommended for patients undergoing treatment with eltrombopag due to the potential for falsely elevated results.  N-acetyl-p-benzoquinone imine (metabolite of Acetaminophen) will generate erroneously low results in samples for patients that have taken an overdose of Acetaminophen.    eGFR 06/10/2025 92  ml/min/1.73sq m Final    WBC 06/10/2025 4.21 (L)  4.31 - 10.16 Thousand/uL Final    RBC 06/10/2025 4.59  3.81 - 5.12 Million/uL Final    Hemoglobin 06/10/2025 15.2  11.5 - 15.4 g/dL Final    Hematocrit 06/10/2025 44.7  34.8 - 46.1 % Final    MCV 06/10/2025 97  82 - 98 fL Final    MCH 06/10/2025 33.1  26.8 - 34.3 pg Final    MCHC 06/10/2025 34.0  31.4 - 37.4 g/dL Final    RDW 06/10/2025 12.6  11.6 - 15.1 % Final    MPV 06/10/2025 11.0  8.9 - 12.7 fL Final    Platelets 06/10/2025 222  149 - 390 Thousands/uL Final    nRBC 06/10/2025 0  /100 WBCs Final    Segmented % 06/10/2025 59  43 - 75 % Final    Immature Grans % 06/10/2025 0  0 - 2 % Final    Lymphocytes % 06/10/2025 31  14 - 44 % Final    Monocytes %  06/10/2025 8  4 - 12 % Final    Eosinophils Relative 06/10/2025 1  0 - 6 % Final    Basophils Relative 06/10/2025 1  0 - 1 % Final    Absolute Neutrophils 06/10/2025 2.46  1.85 - 7.62 Thousands/µL Final    Absolute Immature Grans 06/10/2025 0.01  0.00 - 0.20 Thousand/uL Final    Absolute Lymphocytes 06/10/2025 1.32  0.60 - 4.47 Thousands/µL Final    Absolute Monocytes 06/10/2025 0.33  0.17 - 1.22 Thousand/µL Final    Eosinophils Absolute 06/10/2025 0.06  0.00 - 0.61 Thousand/µL Final    Basophils Absolute 06/10/2025 0.03  0.00 - 0.10 Thousands/µL Final     Pathology: I have reviewed pathology reports described above.    Radiology Results Review: I personally reviewed the following image studies in PACS and associated radiology reports: 10/28/2024 bilateral 3D screening mammogram, 5/14/2025 automated breast ultrasound, 5/21/2025 left breast ultrasound and postbiopsy mammogram, 6/3/2025 bilateral breast MRI. My interpretation of the radiology images/reports is: Mammogram was benign with dense tissue, mass seen on the screening ultrasound with correlate on diagnostic imaging measuring 12 mm, postbiopsy mammogram is concordant with standard clip, no additional areas of concern on MRI.  Concordance: yes           [1]   Past Medical History:  Diagnosis Date    Breast cancer (HCC) 2024    Left    Miscarriage 1988   [2]   Past Surgical History:  Procedure Laterality Date    BREAST BIOPSY      DILATION AND CURETTAGE OF UTERUS  1987    miscarriage    US BREAST NEEDLE LOC LEFT Left 6/27/2025    US GUIDED BREAST BIOPSY LEFT COMPLETE Left 05/21/2025   [3]   Family History  Problem Relation Name Age of Onset    Atrial fibrillation Mother leigha     Heart disease Mother leigha     Hypertension Mother leigha     Melanoma Father  86    No Known Problems Maternal Grandmother fancis     No Known Problems Maternal Grandfather      No Known Problems Paternal Grandmother iesha     No Known Problems Paternal Grandfather      Lung cancer  Paternal Aunt shiva 82    Heart disease Family      Hypertension Family     [4] No Known Allergies

## 2025-07-08 NOTE — DISCHARGE INSTR - AVS FIRST PAGE
POST-OPERATIVE CARE INSTRUCTIONS       Care after your procedure:   General  Rest and relax for 24 hours, then gradually return to normal activities.  Do not perform any heavy lifting or strenuous physical activities for 14 days.  Your activity restrictions will be re-evaluated at your post op visit.  Drink clear liquids until you are certain there is no nausea, then resume a normal diet.  Do not drink alcohol, drive any vehicle, operate mechanical equipment or make critical decisions for at least 24 hours and until you are off any narcotic pain medications.  The Incision  Your incision is closed with:   dissolvable stiches just underneath the skin.                The incision is also covered with:                          clear waterproof glue  A gauze-pad is covering the wound.  Wound care  Remove your gauze-pad after 24 hours.   You may then shower using soap and water to clean your incision. Gently dry the wound.  You may redress your wound with additional gauze and tape if you choose.  A little bruising at the wound site is normal.    Medication  Resume all previous medications  Take either Naproxen (Aleve) one tablet every 8 hours or Ibuprofen(Advil/Motrin) one(1) to two(2) tablets every 6 hours around the clock for the first 2-3 days.       Take this even if you don't think you need it for at least the first 24 hours.  Pain Medication Instructions: may also use over the counter tylenol or prescription tramadol if needed          Other (If applicable)  Wear a post-surgical bra around the clock.  May use ice to the incision site(s) for the next 24-48 hours, twice daily.   Call your  doctor if you have any of the following:  Redness, swelling, heat, drainage, and/or bleeding from your wound  Chills or fever ( above 101' F )  Pain, not relieved with the above medications  If you have any questions or problems call our office [phone]    Follow-up appointment:  As scheduled

## 2025-07-08 NOTE — TELEPHONE ENCOUNTER
Patient's spouse calling to report they received instructions for incentive spirometry for home use prior to discharge and forget how often there were instructed to do it.  I reached out to surgical oncology team and I relayed to the patient that we don't typically need people to do that regularly after outpatient surgery, but if she would like to, she can do it a few times per day. He thanked me.

## 2025-07-09 ENCOUNTER — PATIENT OUTREACH (OUTPATIENT)
Dept: HEMATOLOGY ONCOLOGY | Facility: CLINIC | Age: 65
End: 2025-07-09

## 2025-07-09 NOTE — PROGRESS NOTES
"SENT MY CHART MESSAGE FOR FOLLOW UP STATING THE FOLLOWING:      \"Good Afternoon Anastasiia,    Just wanting to check in with you, I hope all is well.    Should you have any questions, concerns or just unsure of something please feel free to reach out to me directly. If I don't know the answer I will find you the answer or at least point you in the right direction.     Just know I'm here for added support and should you run into any issues you can always reach out and I will assist you in anyway I can.        Hope you have a great rest of your day!        Thank you!      Maritza Dove MA, PN  Breast Patient Navigator  Oncology Care Coordination   [address]  [place] [address]  P:[phone]  F:[phone]  [email or web address]  "

## 2025-07-10 ENCOUNTER — TELEPHONE (OUTPATIENT)
Age: 65
End: 2025-07-10

## 2025-07-10 NOTE — TELEPHONE ENCOUNTER
How does the incision look? WNL    Do you have fever or chills? No    Are you having any pain? Yes     Is it controlled with your pain medication? Yes    What medications are you currently taking for pain control? Tolerated with Tramadol and Advil     Do you have a drain(s)? No    Verify post-op appointment date and time  [x]    Do you have any other questions or concerns? none    **NOTE TO TRIAGER: If patient requires further triage, based upon the answers above, move to appropriate triage protocol.

## 2025-07-15 PROCEDURE — 88342 IMHCHEM/IMCYTCHM 1ST ANTB: CPT | Performed by: PATHOLOGY

## 2025-07-15 PROCEDURE — 88307 TISSUE EXAM BY PATHOLOGIST: CPT | Performed by: PATHOLOGY

## 2025-07-15 PROCEDURE — 88341 IMHCHEM/IMCYTCHM EA ADD ANTB: CPT | Performed by: PATHOLOGY

## 2025-07-15 PROCEDURE — 88344 IMHCHEM/IMCYTCHM EA MLT ANTB: CPT | Performed by: PATHOLOGY

## 2025-07-16 ENCOUNTER — NURSE TRIAGE (OUTPATIENT)
Age: 65
End: 2025-07-16

## 2025-07-16 NOTE — TELEPHONE ENCOUNTER
Returned patient's call to discuss further.    She notes the area of swelling is about a golf ball size, but not hard/round. She denies any redness, fevers, or drainage from the incision at this time.    Discussed the use of warm compresses multiple times per day to help with swelling.    Patient will be attempting to send photo via [employer] for assessment.

## 2025-07-16 NOTE — TELEPHONE ENCOUNTER
"REASON FOR CONVERSATION: recent surgery (Pt calling after her lumpectomy.  She is calling because it is swollen. )    SYMPTOMS: swelling behind her incisional site    OTHER HEALTH INFORMATION: Surgery on 7/8/25    PROTOCOL DISPOSITION: Home Care    CARE ADVICE PROVIDED: Dr. Bates's office will call back to discuss.     PRACTICE FOLLOW-UP: please f/u with Anastasiia to discuss her swelling.       Reason for Disposition   General activity, questions about    Answer Asssessment - Initial Assessment Questions  1. SYMPTOM: \"What's the main symptom you're concerned about?\" (e.g., pain, fever, vomiting)      Swelling behind her incision.  L behind her incision.  2. ONSET: \"When did symptoms start?\"      Yesterday  3. SURGERY: \"What surgery was performed?\"      Lumpectomy with 2 lymph nodes removed.   4. DATE of SURGERY: \"When was surgery performed?\"       7/8/25  5. ANESTHESIA: \" What type of anesthesia did you have?\" (e.g., general, spinal, epidural, local)      general  6. PAIN: \"Is there any pain?\" If Yes, ask: \"How bad is it?\"  (Scale 1-10; or mild, moderate, severe)      Mild.   7. FEVER: \"Do you have a fever?\" If Yes, ask: \"What is your temperature, how was it measured, and when did it start?\"      no  8. VOMITING: \"Is there any vomiting?\" If yes, ask: \"How many times?\"      no  9. BLEEDING: \"Is there any bleeding?\" If Yes, ask: \"How much?\" and \"Where?\"      no  10. OTHER SYMPTOMS: \"Do you have any other symptoms?\" (e.g., drainage from wound, painful urination, constipation)        none    Protocols used: ONC-Gynecologic/Surgical Post-Op Symptoms-ADULT-[place]    "

## 2025-07-18 ENCOUNTER — TELEPHONE (OUTPATIENT)
Age: 65
End: 2025-07-18

## 2025-07-18 ENCOUNTER — NURSE TRIAGE (OUTPATIENT)
Age: 65
End: 2025-07-18

## 2025-07-18 NOTE — TELEPHONE ENCOUNTER
Called and spoke with Anastasiia and reviewed the following message:    Unfortunately, we don't have anything available for NP's or Dr. Bates today. She should continue doing the warm compresses that we discussed with her yesterday, and keep her activity light. If she notices any redness or fevers over the weekend, she should call back into the office.     Can you please offer her 8:45am with Dr. Bates for this Monday in [facility] following message:     Pt will see  on 845 on Monday in [place].

## 2025-07-18 NOTE — TELEPHONE ENCOUNTER
REASON FOR CONVERSATION: No chief complaint on file.    SYMPTOMS: Pt now has a harder swelling in her left breast.     OTHER HEALTH INFORMATION: Had a lumpectomy with lymph node removal     PROTOCOL DISPOSITION: See Today in Office    CARE ADVICE PROVIDED: Continue to monitor and monitor for fever    PRACTICE FOLLOW-UP: Please f/u with pt asap.

## 2025-07-18 NOTE — TELEPHONE ENCOUNTER
"Pt is calling about the swelling  on her Left breast.  Now swelling is \"harder\".      Incision is not red or draining.  Feels like a golf ball and a half.  Well defined.     No drainage.  Glue is peeling off.    Anastasiia is not running a fever  Reason for Disposition   Breast looks infected (spreading redness, feels hot or painful to touch) and no fever    Answer Assessment - Initial Assessment Questions  1. SYMPTOM: \"What's the main symptom you're concerned about?\"  (e.g., lump, nipple discharge, pain, rash )      Lump under left breast.   2. LOCATION: \"Where is the Left breast.  located?\"      Left breast.   3. ONSET: \"When did hard area  start?\"      This morning.   4. PRIOR HISTORY: \"Do you have any history of prior problems with your breasts?\" (e.g., breast cancer, breast implant, fibrocystic breast disease)      Had a lumpectomy with lymph nodes removed.   5. CAUSE: \"What do you think is causing this symptom?\"      Where here lymph nodes were removed   6. OTHER SYMPTOMS: \"Do you have any other symptoms?\" (e.g., fever, breast pain, nipple discharge, redness or rash)      none    Protocols used: Breast Symptoms-Adult-[place]    "

## 2025-07-21 ENCOUNTER — OFFICE VISIT (OUTPATIENT)
Dept: SURGICAL ONCOLOGY | Facility: CLINIC | Age: 65
End: 2025-07-21

## 2025-07-21 VITALS
DIASTOLIC BLOOD PRESSURE: 82 MMHG | TEMPERATURE: 97.8 F | HEIGHT: 63 IN | OXYGEN SATURATION: 97 % | WEIGHT: 119 LBS | HEART RATE: 78 BPM | SYSTOLIC BLOOD PRESSURE: 138 MMHG | BODY MASS INDEX: 21.09 KG/M2

## 2025-07-21 DIAGNOSIS — Z13.71 BRCA NEGATIVE: ICD-10-CM

## 2025-07-21 DIAGNOSIS — R92.343 EXTREMELY DENSE TISSUE OF BOTH BREASTS ON MAMMOGRAPHY: ICD-10-CM

## 2025-07-21 DIAGNOSIS — Z17.0 MALIGNANT NEOPLASM OF UPPER-INNER QUADRANT OF LEFT BREAST IN FEMALE, ESTROGEN RECEPTOR POSITIVE (HCC): Primary | ICD-10-CM

## 2025-07-21 DIAGNOSIS — C50.212 MALIGNANT NEOPLASM OF UPPER-INNER QUADRANT OF LEFT BREAST IN FEMALE, ESTROGEN RECEPTOR POSITIVE (HCC): Primary | ICD-10-CM

## 2025-07-21 DIAGNOSIS — N64.9 BREAST VARIANT: ICD-10-CM

## 2025-07-21 PROBLEM — R92.342 EXTREMELY DENSE TISSUE OF LEFT BREAST ON MAMMOGRAPHY: Status: ACTIVE | Noted: 2025-07-21

## 2025-07-21 PROCEDURE — 99024 POSTOP FOLLOW-UP VISIT: CPT | Performed by: SURGERY

## 2025-07-21 NOTE — PROGRESS NOTES
Name: Anastasiia Kumari      : 1960      MRN: [id number]  Encounter Provider: Areli Bates MD  Encounter Date: 2025   Encounter department: [facility] [place]  :  Assessment & Plan  Malignant neoplasm of upper-inner quadrant of left breast in female, estrogen receptor positive (HCC)         Extremely dense tissue of both breasts on mammography         BRCA negative         Breast variant         65-year-old female status post left lumpectomy and sentinel node biopsy.  She is healing well with no signs of infection.  I aspirated a seroma today from the left axilla with clear serous fluid obtained.  I advised her to continue using warm compresses.  She should limit her activity until this completely resolves.  She is scheduled to meet with both medical and radiation oncology tomorrow.  We will continue following her here as well.  We will see her again in 6 months for survivorship visit.  We also reviewed her imaging again today.  Her malignancy was mammographically occult.  She therefore would benefit from adjuvant imaging in addition to the mammogram.  Her malignancy was detected on screening ultrasound which we can continue versus breast MRI.        History of Present Illness   Anastasiia Kumari is a 65 y.o. year old female who presents for postop visit.  She states that the swelling in the axilla has gone down.  She denies any other concerns.     Oncology History   Cancer Staging   Malignant neoplasm of upper-inner quadrant of left breast in female, estrogen receptor positive (HCC)  Staging form: Breast, AJCC 8th Edition  - Clinical stage from 2025: Stage IA (cT1a, cN0, cM0, G2, ER+, OK+, HER2-) - Signed by Areli Bates MD on 2025  Stage prefix: Initial diagnosis  Method of lymph node assessment: Clinical  Histologic grading system: 3 grade system  - Pathologic stage from 2025: Stage IA (pT1b, pN0(sn), cM0, G1, ER+, OK+, HER2-) - Signed by Areli Bates  MD on 7/21/2025  Stage prefix: Initial diagnosis  Method of lymph node assessment: [place] lymph node biopsy  Histologic grading system: 3 grade system  Oncology History Overview Note   With left breast cancer. She was referred by Dr. Bates. She is being seen today for Radiation Oncology consult.     Patient had ABUS on 5/14/25 which showed a 12 mm oval mass in the left breast at 11 o'clock 4cmfn. Additional US of left breast on 5/21/25 showed the hypoechocic mass in the left breast and biopsy recommended. 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 She met with Dr. Bates and opted for breast conservation. Genetic testing was negative.         5/14/25 ABUS  FINDINGS:   LEFT  1) MASS [A]: There is a 12 mm oval, hypoechoic mass seen in the left breast at 11 o'clock in the posterior depth, 4 cm from the nipple.      Right  There are no suspicious masses or areas of architectural distortion.      IMPRESSION:   Possible mass in the left breast is incompletely characterized.  Targeted ultrasound is indicated.     No ultrasound evidence of invasive malignancy in the right breast.     Automated breast ultrasound is an adjunct, not a replacement, for routine yearly screening mammography.     ASSESSMENT/BI-RADS CATEGORY:  Left: 0 - Incomplete: Needs Additional Imaging Evaluation  Right: 1 - Negative  Overall: 0 - Incomplete: Needs Additional Imaging Evaluation     RECOMMENDATION:       - Ultrasound at the current time for the left breast.       - Continue annual screening mammography for the right breast.       - ABUS in 1 year for the right breast.      5/21/25 US left breast diagnostic  FINDINGS:   LEFT  1) MASS [A]: There is a 12 mm x 7 mm x 14 mm oval, parallel, hypoechoic mass with indistinct margins seen in the left breast at 11 o'clock in the posterior depth, 4 cm  from the nipple.          IMPRESSION:   Hypoechoic mass in the left breast 11:00, 4 cm from the nipple, suspicious.  Ultrasound-guided biopsy recommended.     The findings and recommendation were discussed by me with the patient that she expressed understanding.           ASSESSMENT/BI-RADS CATEGORY:  Left: 4A - Low Suspicion for Malignancy  Overall: 4 - Suspicious     RECOMMENDATION:       - Ultrasound-guided breast biopsy for the left breast.       6/3/25 Mammo breast bilateral  FINDINGS:   LEFT  1) MASS [A]: There is a 12 mm x 8 mm x 11 mm irregularly shaped mass with irregular margins and heterogeneous internal enhancement seen in the left breast at 11 o'clock in the posterior depth, 4 cm from the nipple. There is an associated barbell-shaped clip.  There is a low T1 and T2 signal area centrally in keeping with biopsy clip.  This is best visualized on subtraction image 108.  This has mixed kinetics.  This is located 10 mm anterior to the adjacent chest wall musculature and 15 mm from the medial skin surface.     There is no additional suspicious mass, non-mass enhancement or focus of enhancement within the left breast.     Right  There are no suspicious enhancing masses or suspicious areas of non-mass enhancement. There is no axillary or internal mammary adenopathy.      There are multiple T2 hyperintense lesions within the liver suggestive of cysts.     IMPRESSION:   1.  12 x 11 x 8 mm heterogeneously enhancing mass 11 o'clock position left breast in keeping with biopsy-proven carcinoma.  No additional suspicious enhancement in the left breast.  No suspicious axillary or internal mammary lymph nodes.  2.  No suspicious enhancement in the right breast.  3.  Multiple T2 hyperintense lesions within the liver suggestive of cysts.  Further characterization with liver ultrasound could be considered as clinically warranted.     ASSESSMENT/BI-RADS CATEGORY:  Left: 6 - Known Biopsy-Proven Malignancy  Right: 1 -  Negative  Overall: 6 - Known Biopsy-Proven Malignancy     RECOMMENDATION:       - Surgical consultation for the left breast.       - Continue annual screening mammography for the right breast.       - Breast MRI Screening in 1 year for the right breast.      6/4/25 Dr. Bates  Good candidate for breast conservation. Patient wishes to proceed with this. Need updated mammogram. Mammaprint not performed due to small component of invasion.         6/27/25 mammo diagnostic bilateral  FINDINGS:   Bilateral  There are no new suspicious masses, grouped microcalcifications or areas of unexplained architectural distortion.  A dragonfly marker in the upper inner left breast corresponds with the site of biopsy-proven carcinoma.  A queried asymmetry just posterior to the nipple effaces on the supplemental spot compression view.  No prior suspicious correlate was seen on recent MRI.  This is consistent with overlapping fibroglandular tissue.  There has been no significant change in the appearance of the right breast since prior study.  The skin and nipple areolar complex are unremarkable.         IMPRESSION:   Biopsy-proven malignancy in the left breast denoted by dragonfly marker.  There is no evidence of malignancy in the right breast.           ASSESSMENT/BI-RADS CATEGORY:  Left: 6 - Known Biopsy-Proven Malignancy  Right: 2 - Benign  Overall: 6 - Known Biopsy-Proven Malignancy     RECOMMENDATION:       - Surgical consultation for the left breast.       - Diagnostic mammogram in 1 year for the right breast.      7/8/25 Lumpectomy  7/21/25 Dr. Bates  7/22/25 Dr. Soler           Malignant neoplasm of upper-inner quadrant of left breast in female, estrogen receptor positive (HCC)   5/21/2025 Biopsy    Left US-guided bx    11:00 4cmfn  IDC  Grade 2  ER , AL 61-70, HER2 1+  12mm     5/21/2025 -  Cancer Staged    Staging form: Breast, AJCC 8th Edition  - Clinical stage from 5/21/2025: Stage IA (cT1a, cN0, cM0, G2, ER+, AL+,  "HER2-) - Signed by Areli Bates MD on 6/4/2025  Stage prefix: Initial diagnosis  Method of lymph node assessment: Clinical  Histologic grading system: 3 grade system       5/27/2025 Genetic Testing    [facility]     [facility]     6/3/2025 Observation    B/L breast MRI    IMPRESSION:   1.  12 x 11 x 8 mm heterogeneously enhancing mass 11 o'clock position left breast in keeping with biopsy-proven carcinoma.  No additional suspicious enhancement in the left breast.  No suspicious axillary or internal mammary lymph nodes.  2.  No suspicious enhancement in the right breast.  3.  Multiple T2 hyperintense lesions within the liver suggestive of cysts.  Further characterization with liver ultrasound could be considered as clinically warranted.     7/8/2025 Surgery    Left breast TUSHAR-localized lumpectomy w/ SLNB    IDC  Grade 1  9mm  DCIS present, grade 2  All margins negative for invasive carcinoma and DCIS  0/2 sentinel lymph nodes     7/8/2025 -  Cancer Staged    Staging form: Breast, AJCC 8th Edition  - Pathologic stage from 7/8/2025: Stage IA (pT1b, pN0(sn), cM0, G1, ER+, NC+, HER2-) - Signed by Areli Bates MD on 7/21/2025  Stage prefix: Initial diagnosis  Method of lymph node assessment: [place] lymph node biopsy  Histologic grading system: 3 grade system          Review of Systems A complete review of systems is negative other than that noted above in the HPI.           Objective   /82 (BP Location: Right arm, Patient Position: Sitting, Cuff Size: Standard)   Pulse 78   Temp 97.8 °F (36.6 °C) (Temporal)   Ht 5' 3\" (1.6 m)   Wt 54 kg (119 lb)   SpO2 97%   BMI 21.08 kg/m²     Pain Screening:  Pain Score: 0-No pain (R armpit discomfort)  ECOG    Physical Exam  Constitutional:       General: She is not in acute distress.     Appearance: Normal appearance.   Chest:   Breasts:     Left: Swelling (in the axilla) and skin change (Well-healing incisions in the breast and axilla with no " signs of infection) present.      Comments: Axillary seroma was aspirated today in the office with clear serous fluid obtained    Neurological:      Mental Status: She is alert and oriented to person, place, and time.     Psychiatric:         Mood and Affect: Mood normal.          Labs: I have reviewed pertinent labs.     Admission on 07/08/2025, Discharged on 07/08/2025   Component Date Value Ref Range Status    Case Report 07/08/2025    Final                    Value:Surgical Pathology Report                         Case: [id number]                                  Authorizing Provider:  Areli Bates MD           Collected:           07/08/2025 1207              Ordering Location:     [facility]        Received:            07/08/2025 1312                                     [facility]                                                     Pathologist:           Leeanne Faustin MD                                                                    Specimens:   A) - Breast, Left, LEFT Lumpectomy, Short Superior, Long Lateral                                    B) - Breast, Left, new medial margin left breast- stitch marks true margin                          C) - Breast, Left, new superior margin left breast- stitch marks true margin                        D) - Breast, Left, new inferior margin left breast- stitch marks true margin                        E) - Breast, Left, new posterior margin left breast- stitch marks true margin                                                 F) - Lymph Node, [place], left axilla sentinel lymph node #1                                       G) - Lymph Node, [place], left axilla sentinel lymph node #2                              Final Diagnosis 07/08/2025    Final                    Value:A. 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    -   Breast  prognostic markers (ER, IA, HER2) performed on biopsy specimen [id number].   -   Background breast tissue with microcalcification, usual ductal hyperplasia, apocrine metaplasia, microcysts.  -   Skin with no significant histopathologic change.    B. New medial margin left breast- stitch marks true margin, resection:  -   Focus of atypical glands, less than 1 mm from new true resection margin.  -   Background breast tissue with microcysts.        C. New superior margin left breast- stitch marks true margin, resection:  -   Breast tissue with apocrine metaplasia and microcalcification.  -   New true resection margin is negative for atypical hyperplasia, carcinoma in situ, and malignancy.      D. New inferior margin left breast- stitch marks true margin, resection:  -   Breast tissue with apocrine metaplasia.  -                             New true resection margin is negative for atypical hyperplasia, carcinoma in situ, and malignancy.       E. New posterior margin left breast- stitch marks true margin, resection:  -   Breast tissue with apocrine metaplasia.  -   New true resection margin is negative for atypical hyperplasia, carcinoma in situ, and malignancy.       F. Left axilla sentinel lymph node #1, excision:  -   One lymph node, negative for metastatic carcinoma (0/1).      G. Left axilla sentinel lymph node #2, excision:  -   One lymph node, negative for metastatic carcinoma, confirmed with CK AE1 / AE3 immunostain (0/1).      See comment and synoptic report.    Comment:   A) A5, A7 and A9 are three consecutive blocks containing biopsy cavity.  There are few atypical glands surrounding the biopsy cavity with loss of myoepithelial cells on A5, confirmed with breast triple stain multiplex (CK5/p63 CK8/18); the findings favor invasive carcinoma.  Immunohistochemical stains on A7 and A9 show the invasive carcinoma                           is positive for E-cadherin (membranous staining) and p120 Catenin  (membranous staining), and is negative for CK5/6 and p63.      B) Breast triple stain multiplex (CK5/p63 CK8/18), CK5/6 and calponin immunostains on B1 confirm the loss of myoepithelial cells in focus of atypical glands.  Immunostains on B1 show the focus of atypical glands is positive for ER, E-cadherin (membranous staining), p120 Catenin (membranous staining).  The findings may represent displaced carcinoma cells due to needle biopsy, however a separate focus of invasive carcinoma cannot be entirely excluded.    Select slides (A7-1, IHC, A9-1, IHC, B1-1, B1-6, IHC) are reviewed by Dr. Rafi Sagastume and Dr. Fiordaliza Hinojosa who concur with the diagnosis.    Interpretation performed at [facility] [address] [place] [address] [place] [address]       Note 07/08/2025    Final                    Value:BEST TUMOR BLOCK(S) FOR FUTURE STUDIES:  A7, A9      Additional Information 07/08/2025    Final                    Value:All reported additional testing was performed with appropriately reactive controls.  These tests were developed and their performance characteristics determined by [facility] or appropriate performing facility, though some tests may be performed on tissues which have not been validated for performance characteristics (such as staining performed on alcohol exposed cell blocks and decalcified tissues).  Results should be interpreted with caution and in the context of the patients’ clinical condition. These tests may not be cleared or approved by the U.S. Food and Drug Administration, though the FDA has determined that such clearance or approval is not necessary. These tests are used for clinical purposes and they should not be regarded as investigational or for research. This laboratory has been approved by CLIA 88, designated as a high-complexity laboratory and is qualified to perform these tests.  .      Synoptic Checklist 07/08/2025    Final                    Value:INVASIVE  CARCINOMA OF THE BREAST: Resection                            INVASIVE CARCINOMA OF THE BREAST: RESECTION - All Specimens                            8th Edition - Protocol posted: 6/19/2024                                                        SPECIMEN                               Procedure:    Excision (less than total mastectomy)                               Specimen Laterality:    Left                                                        TUMOR                               Tumor Site:    Clock position                               :    11 o'clock                             Histologic Type:    Invasive carcinoma of no special type (ductal)                               Histologic Type Comment:    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                             Histologic Grade (Salvatore Histologic Score):                                   Glandular (Acinar) / Tubular Differentiation:    Score 2                               Nuclear Pleomorphism:    Score 2                               Mitotic Rate:    Score 1                               Overall Grade:    Grade 1 (scores of 3, 4 or 5)                             Tumor Size:    Greatest dimension of largest invasive focus (Millimeters): 9 mm                             Tumor Focality:    Cannot be determined: Focus of atypical glands in B1 may represent displaced carcinoma cells due to needle biopsy, however a separate focus of invasive carcinoma cannot be entirely excluded (less than 1 mm from new true medial resection margin)                             Ductal Carcinoma In Situ (DCIS):    Present                               :    Negative for extensive intraductal component (EIC)                               Architectural Patterns:    Cribriform                               Architectural Patterns:    Papillary                               Nuclear Grade:    Grade II (intermediate)                                Necrosis:    Present, focal (small foci or single cell necrosis)                               Number of Blocks with DCIS:    2                             Lobular Carcinoma In Situ (LCIS):    Not identified                             Lymphatic and / or Vascular Invasion:    Not identified                             Dermal Lymphatic and / or Vascular Invasion:    Not identified                             Microcalcifications:    Present in DCIS                             Microcalcifications:    Present in invasive carcinoma                             Microcalcifications:    Present in non-neoplastic tissue                             Treatment Effect in the Breast:    No known presurgical therapy                                                        MARGINS                             Margin Status for Invasive Carcinoma:    All margins negative for invasive carcinoma                               Distance from Invasive Carcinoma to Closest Margin:    Greater than: 5 mm                               Closest Margin(s) to Invasive Carcinoma:    Posterior                             Margin Status for DCIS:    All margins negative for DCIS                               Distance from DCIS to Closest Margin:    Greater than: 5 mm                               Closest Margin(s) to DCIS:    Posterior                                                        REGIONAL LYMPH NODES                             Regional Lymph Node Status:                                   :    All regional lymph nodes negative for tumor                               Total Number of Lymph Nodes Examined (sentinel and non-sentinel):    2                               Number of [place] Nodes Examined:    2                                                        pTNM CLASSIFICATION (AJCC 8th Edition)                               Reporting of pT, pN, and (when applicable) pM categories is based on information available to the pathologist at the  "time the report is issued. As per the AJCC (Chapter 1, 8th Ed.) it is the managing physician's responsibility to establish the final pathologic stage based upon all pertinent information, including but potentially not limited to this pathology report.                             pT Category:    pT1b                             pN Category:    pN0                             N Suffix:    (sn)                                                         Comment(s):    Breast prognostic markers (ER, IA, HER2) performed on biopsy specimen [id number].       Gross Description 07/08/2025    Final                    Value:A. The specimen is received in formalin, labeled with the patient's name and hospital number, and is designated \" left breast lumpectomy\".  The specimen consists of a fatty tissue fragment measuring 3.8 x 3.3 x 2.9 cm.  The specimen is marked by 2 sutures which according to the physician short is superior and long is lateral.  The specimen is inked as follows:  anterior-green, posterior-black, superior-red, inferior-blue, medial-yellow and lateral-orange.  An unremarkable tan ellipse of skin measuring 2.1 x 1.0 cm is present on the superior aspect of the anterior surface.  The specimen is sectioned in a medial to lateral progression into 8 slices revealing a tan gritty lesion present in slices 4-6 which measures 1.0 x 0.9 x 0.7 cm.  A Isabel  is identified within the lesion in slice 4.  A dragonfly clip is identified within the lesion in slice 5.  The lesion extends to within less than 0.1 cm of the posterior margin and is located 0.5 cm or greater from the remaining margins.  The                           remainder of the specimen exhibits yellow fatty and white fibrous cut surfaces.  No additional lesions are seen or palpated.  Representative sections.  11 cassettes.  See attached breast map.  1-2: Slice 1, medial margin, sectioned  3: Slice 3, tissue immediately medial to lesion  4-5: Slice 4, lesion with " "adjacent margins, bisected anterior/posterior  6-7: Slice 5, lesion with adjacent margins, bisected anterior/posterior  8-9: Slice 6, remainder of lesion with adjacent margins, bisected anterior/posterior  10: Slice 7, tissue immediately lateral to lesion  11: Slice 8, lateral margin nearest lesion, 2 pieces  B. The specimen is received in formalin, labeled with the patient's name and hospital number, and is designated \" new medial margin left breast\".  The specimen consists of a fatty tissue fragment measuring 1.7 x 1.5 x 1.0 cm.  The specimen is marked by 1 suture which according to the physician indicates the true margin.  The true margin is inked blue and the remaining surfaces                           are inked black.  The specimen is quadrisected and entirely submitted for histologic examination in 2 cassettes, 2 pieces in each cassette.  C. The specimen is received in formalin, labeled with the patient's name and hospital number, and is designated \" new superior margin left breast\".  The specimen consists of a fatty tissue fragment measuring 1.4 x 0.9 x 0.9 cm.  The specimen is marked by 1 suture which according to the physician indicates the true margin.  The true margin is inked blue and the remaining surfaces are inked black.  The specimen is trisected and entirely submitted for histologic examination in 1 cassette.  D. The specimen is received in formalin, labeled with the patient's name and hospital number, and is designated \" new inferior margin left breast\".  The specimen consists of a fatty tissue fragment measuring 1.5 x 1.2 x 1.0 cm.  The specimen is marked by 1 suture which according to the physician indicates the true margin.  The true margin is inked blue and the                           remaining surfaces are inked black.  The specimen is trisected and entirely submitted for histologic examination in 1 cassette.  E. The specimen is received in formalin, labeled with the patient's name and " "hospital number, and is designated \" new posterior margin left breast\".  The specimen consists of a fatty tissue fragment measuring 1.2 x 0.9 x 0.5 cm.  The specimen is marked by 1 suture which according to the physician indicates the true margin.  The true margin is inked blue and the remaining surfaces are inked black.  The specimen is trisected and entirely submitted for histologic examination in 1 cassette.   F. The specimen is received in formalin, labeled with the patient's name and hospital number, and is designated \" left axilla sentinel lymph node 1\".  The specimen consists of a tan-yellow fatty tissue fragment measuring 1.4 cm.  The fragment is bisected revealing a single tan-purple density consistent with a lymph node measuring approximately 1 cm in greatest dimension.                            Entirely submitted. One cassette.  G. The specimen is received in formalin, labeled with the patient's name and hospital number, and is designated \" left axilla sentinel lymph node 2\".  The specimen consists of a tan nodule measuring 0.7 x 0.5 x 0.4 cm.  The specimen is bisected and entirely submitted for histologic examination in 1 cassette.  Submitted on sponges.    Note: The estimated total formalin fixation time based upon information provided by the submitting clinician and the standard processing schedule is 36.0 hours.    The cold ischemia time based upon information provided by the submitting clinician and receiving staff in the laboratory is within 1 minute.    [facility]      Clinical Information 07/08/2025    Final                    Value:Per op note,   Operative Indications:  Malignant neoplasm of upper-inner quadrant of left breast in female, estrogen receptor positive (HCC) [C50.212, Z17.0]      Operative Findings:  Isabel reflector and dragonfly clip in specimen     Pathology: I have reviewed pathology reports described above.        "

## 2025-07-22 ENCOUNTER — CONSULT (OUTPATIENT)
Dept: HEMATOLOGY ONCOLOGY | Facility: CLINIC | Age: 65
End: 2025-07-22
Attending: SURGERY
Payer: MEDICARE

## 2025-07-22 ENCOUNTER — CONSULT (OUTPATIENT)
Facility: HOSPITAL | Age: 65
End: 2025-07-22
Attending: SURGERY
Payer: MEDICARE

## 2025-07-22 VITALS
HEIGHT: 63 IN | BODY MASS INDEX: 21.12 KG/M2 | HEART RATE: 84 BPM | DIASTOLIC BLOOD PRESSURE: 90 MMHG | OXYGEN SATURATION: 100 % | RESPIRATION RATE: 18 BRPM | WEIGHT: 119.2 LBS | SYSTOLIC BLOOD PRESSURE: 150 MMHG | TEMPERATURE: 97.7 F

## 2025-07-22 VITALS
SYSTOLIC BLOOD PRESSURE: 148 MMHG | OXYGEN SATURATION: 98 % | HEIGHT: 63 IN | TEMPERATURE: 98 F | WEIGHT: 119.6 LBS | HEART RATE: 84 BPM | RESPIRATION RATE: 16 BRPM | BODY MASS INDEX: 21.19 KG/M2 | DIASTOLIC BLOOD PRESSURE: 82 MMHG

## 2025-07-22 DIAGNOSIS — Z17.0 MALIGNANT NEOPLASM OF UPPER-INNER QUADRANT OF LEFT BREAST IN FEMALE, ESTROGEN RECEPTOR POSITIVE (HCC): ICD-10-CM

## 2025-07-22 DIAGNOSIS — C50.212 MALIGNANT NEOPLASM OF UPPER-INNER QUADRANT OF LEFT BREAST IN FEMALE, ESTROGEN RECEPTOR POSITIVE (HCC): ICD-10-CM

## 2025-07-22 DIAGNOSIS — Z78.0 ASYMPTOMATIC MENOPAUSE: Primary | ICD-10-CM

## 2025-07-22 PROCEDURE — 99204 OFFICE O/P NEW MOD 45 MIN: CPT | Performed by: INTERNAL MEDICINE

## 2025-07-22 PROCEDURE — 99211 OFF/OP EST MAY X REQ PHY/QHP: CPT | Performed by: RADIOLOGY

## 2025-07-22 PROCEDURE — 99245 OFF/OP CONSLTJ NEW/EST HI 55: CPT | Performed by: RADIOLOGY

## 2025-07-22 PROCEDURE — G2211 COMPLEX E/M VISIT ADD ON: HCPCS | Performed by: INTERNAL MEDICINE

## 2025-07-22 RX ORDER — ANASTROZOLE 1 MG/1
1 TABLET ORAL DAILY
Qty: 30 TABLET | Refills: 2 | Status: SHIPPED | OUTPATIENT
Start: 2025-07-22

## 2025-07-22 NOTE — ASSESSMENT & PLAN NOTE
Orders:    Ambulatory Referral to Hematology / Oncology    anastrozole (ARIMIDEX) 1 mg tablet; Take 1 tablet (1 mg total) by mouth daily (Patient not taking: Reported on 7/22/2025)

## 2025-07-22 NOTE — PROGRESS NOTES
Consultation Visit   Name: Anastasiia Kumari      : 1960      MRN: [id number]  Encounter Provider: Nika Chadwick MD  Encounter Date: 2025   [facility]  :  Assessment & Plan  Malignant neoplasm of upper-inner quadrant of left breast in female, estrogen receptor positive (HCC)    Ms. Kumari is a 65-year-old postmenopausal woman who had screening ultrasound that showed a mass in the upper central left breast, biopsy showed a grade 2 invasive duct carcinoma the strongly estrogen and progesterone receptor positive, HER2 negative, insufficient tissue for MammaPrint.  She underwent a lumpectomy and sentinel node biopsy by Dr. Bates on 2025.  She had aspiration of an axillary seroma which is reaccumulated.  Final pathology showed a 9 mm grade 1 invasive ductal carcinoma with negative margins of excision and negative sentinel nodes, pathologic stage pT1b pN0 cM0, stage IA.    I explained to the patient that radiation is used to eradicate microscopic deposits of disease which may reside in the breast after surgery.  Radiation therefore reduces the risk of local recurrence and contributes to overall survival benefit.  I described the randomized trials comparing mastectomy to lumpectomy with or without radiation, showing equivalent outcomes and establishing the efficacy and safety of breast conservation treatment with lumpectomy and radiation.  I explained that lumpectomy alone is associated with a higher risk of local recurrence.  I described the randomized trials comparing different fractionation regimens for treatment of the left breast including hypofractionated whole breast radiation and accelerated partial breast radiation, which in several large randomized trials have shown excellent 10-year local recurrence rates of 2 to 3%.  I described to the patient the ongoing randomized trial, NRG , which randomizes women with a low  molecular risk stage I breast cancer based on low risk Oncotype recurrence score to endocrine therapy alone with or without standard of care breast radiation.  Patient declined study enrollment and does not wish for Oncotype recurrence score testing.  I described the procedure involving radiation to the left breast including the importance of cardiac avoidance using deep inspiration breath-hold, cardiac blocking, partial breast technique.  I described the potential acute and long-term side effects of left breast radiation.  Acutely she may experience localized skin irritation, swelling in the breast or fatigue.  Late effects can include the possibility fibrosis formation which could lead to changes in texture or cosmesis.  Patient expressed understanding these recommendations.  She would like to proceed with accelerated partial breast irradiation as technically feasible.  Informed consent was read by me and signed by the patient today.  CT simulation was scheduled for 2 weeks to allow for additional wound healing.        History of Present Illness   Chief Complaint   Patient presents with    Consult   Pertinent Medical History     Ms. Kumari is a 65-year-old postmenopausal woman who had routine screening mammograms on October 28, 2024 that showed extremely dense breast tissue with no suspicious findings or calcifications bilaterally.  Patient requested screening ultrasound which was performed on May 14, 2025.  She was not specifically palpating any mass in either breast on self-exam.  That study showed a 1.2 cm hypoechoic mass in the left breast 11 o'clock position with no suspicious findings on the right.  Diagnostic ultrasound and biopsy were performed on May 21, 2025 of a 1.4 cm hypoechoic mass at the 11 o'clock position of the left breast.  Pathology showed grade 2 invasive ductal carcinoma, estrogen receptor 100%, progesterone receptor 70%, HER2 negative, with associated noninvasive papillary carcinoma.   Genetic testing showed no pathogenic variant.  There was insufficient tissue to perform MammaPrint.  She underwent a lumpectomy and sentinel node biopsy by Dr. Bates on July 8, 2025.  Final pathology showed a 9 mm grade 1 invasive ductal carcinoma with DCIS present not extensive, negative evidence of excision, lymphovascular invasion 2 negative sentinel nodes.  Pathologic stage pT1b pN0 cM0.  An axillary seroma was aspirated at her postoperative visit.  She was seen by Dr. Soler in medical oncology earlier today and reports that endocrine therapy was recommended.  She is referred by Dr. Bates for information regarding the role of radiation therapy in the setting of breast conservation treatment for an early-stage low risk left breast cancer.      Oncology History   Cancer Staging   Malignant neoplasm of upper-inner quadrant of left breast in female, estrogen receptor positive (HCC)  Staging form: Breast, AJCC 8th Edition  - Clinical stage from 5/21/2025: Stage IA (cT1a, cN0, cM0, G2, ER+, [place]+, HER2-) - Signed by Areli Bates MD on 6/4/2025  Stage prefix: Initial diagnosis  Method of lymph node assessment: Clinical  Histologic grading system: 3 grade system  - Pathologic stage from 7/8/2025: Stage IA (pT1b, pN0(sn), cM0, G1, ER+, [place]+, HER2-) - Signed by Areli Bates MD on 7/21/2025  Stage prefix: Initial diagnosis  Method of lymph node assessment: [place] lymph node biopsy  Histologic grading system: 3 grade system  Oncology History Overview Note   7/8/2025 - left sided partial mastectomy with SLNBX for an invasive ductal carcinoma, grade 1 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, pT1b(9mm)N0M0, ER % [place] 61-70% Her2 1+    No mammaprint due to insufficient tissue on biopsy     Malignant neoplasm of upper-inner quadrant of left breast in female, estrogen receptor positive (HCC)   5/21/2025 Biopsy    Left US-guided bx    11:00 4cmfn  IDC  Grade 2  ER , [place] 61-70, HER2 1+  12mm    "  5/21/2025 -  Cancer Staged    Staging form: Breast, AJCC 8th Edition  - Clinical stage from 5/21/2025: Stage IA (cT1a, cN0, cM0, G2, ER+, [place]+, HER2-) - Signed by Areli Bates MD on 6/4/2025  Stage prefix: Initial diagnosis  Method of lymph node assessment: Clinical  Histologic grading system: 3 grade system       5/27/2025 Genetic Testing    [facility] BRCA[facility] Cancer +RNAInsight     [facility]     6/3/2025 Observation    B/L breast MRI    IMPRESSION:   1.  12 x 11 x 8 mm heterogeneously enhancing mass 11 o'clock position left breast in keeping with biopsy-proven carcinoma.  No additional suspicious enhancement in the left breast.  No suspicious axillary or internal mammary lymph nodes.  2.  No suspicious enhancement in the right breast.  3.  Multiple T2 hyperintense lesions within the liver suggestive of cysts.  Further characterization with liver ultrasound could be considered as clinically warranted.     7/8/2025 Surgery    Left breast TUSHAR-localized lumpectomy w/ SLNB    IDC  Grade 1  9mm  DCIS present, grade 2  All margins negative for invasive carcinoma and DCIS  0/2 sentinel lymph nodes     7/8/2025 -  Cancer Staged    Staging form: Breast, AJCC 8th Edition  - Pathologic stage from 7/8/2025: Stage IA (pT1b, pN0(sn), cM0, G1, ER+, [place]+, HER2-) - Signed by Areli Bates MD on 7/21/2025  Stage prefix: Initial diagnosis  Method of lymph node assessment: [place] lymph node biopsy  Histologic grading system: 3 grade system          Review of Systems Refer to nursing note.       Objective   /90 Comment: states it is usually elevated in the doctor's offices and comes down when she is at home  Pulse 84   Temp 97.7 °F (36.5 °C)   Resp 18   Ht 5' 3\" (1.6 m)   Wt 54.1 kg (119 lb 3.2 oz)   SpO2 100%   BMI 21.12 kg/m²     Pain Screening:  Pain Score:   1  ECOG 0   Physical Exam  Vitals and nursing note reviewed.   Constitutional:       General: She is not in acute distress.     Appearance: Normal appearance. "   HENT:      Nose: No congestion.     Eyes:      General: No scleral icterus.     Extraocular Movements: Extraocular movements intact.      Pupils: Pupils are equal, round, and reactive to light.     Pulmonary:      Effort: Pulmonary effort is normal. No respiratory distress.   Chest:   Breasts:     Right: Normal.        Comments: Left breast: Well-healing incision in the upper central breast and lower axilla, residual surgical glue, palpable 2 to 3 cm seroma in the axilla without or tenderness.    Musculoskeletal:         General: No swelling. Normal range of motion.      Cervical back: Normal range of motion.   Lymphadenopathy:      Cervical: No cervical adenopathy.      Upper Body:      Right upper body: No supraclavicular or axillary adenopathy.      Left upper body: No supraclavicular or axillary adenopathy.     Skin:     General: Skin is warm and dry.     Neurological:      General: No focal deficit present.      Mental Status: She is alert and oriented to person, place, and time.     Psychiatric:         Mood and Affect: Mood normal.         Thought Content: Thought content normal.         Judgment: Judgment normal.          Radiology Results: I have reviewed radiology images/reports described above. Radiology Results Review: I personally reviewed the following image studies in PACS and associated radiology reports: Mammogram and Breast Ultrasound. My interpretation of the radiology images/reports is: Mass in the upper central left breast, no lymphadenopathy.  Other Study Results Review : Pathology reports reviewed.    Administrative Statements   I have spent a total time of 60 minutes in caring for this patient on the day of the visit/encounter including Diagnostic results, Prognosis, Risks and benefits of tx options, Instructions for management, Patient and family education, Risk factor reductions, Counseling / Coordination of care, Documenting in the medical record, Reviewing/placing orders in the medical  record (including tests, medications, and/or procedures), and Obtaining or reviewing history  .

## 2025-07-22 NOTE — PROGRESS NOTES
Name: Anastasiia Kumari      : 1960      MRN: [id number]  Encounter Provider: Kailey Soler DO  Encounter Date: 2025   Encounter department: [facility] [place]  :  Assessment & Plan  Malignant neoplasm of upper-inner quadrant of left breast in female, estrogen receptor positive (HCC)    Orders:    Ambulatory Referral to Hematology / Oncology    anastrozole (ARIMIDEX) 1 mg tablet; Take 1 tablet (1 mg total) by mouth daily (Patient not taking: Reported on 2025)    Asymptomatic menopause    Orders:    DXA bone density spine hip and pelvis; Future    65-year-old female with a pT1b N0 ER positive invasive ductal carcinoma 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  She has very low grade, ER positive, early stage disease.  Her prognosis is excellent even without adjuvant endocrine therapy.  I did offer her anastrozole.We reviewed the side effects of aromatase inhibitors which include joint aches, worsening of bone density, sexual dysfunction, worsening of mood, difficulty sleeping and hot flashes.    If she would have any difficulty tolerating an aromatase inhibitor she would be a candidate for tamoxifen as well.  She has never had a blood clot or stroke in the past.  I think think we need to assess her quality of life in the setting of these medications.  If there would be excessive side effects I do not absolutely feel that she has to do the 5 years of adjuvant endocrine therapy if it is poorly tolerated.  Her disease was very small and very low-grade.  She will be meeting with radiation this week as well.  I gave her a prescription for the anastrozole to start after radiation or if she does not do radiation, she can start it now.  I will see her back in 3 months with a baseline DEXA scan.  She is going to try to avoid using vaginal estrogens.  I explained that the Vagifem suppository does have very minimal systemic absorption so if she is  running into issues with vaginal dryness and painful sexual intercourse we could consider still letting her use it for quality-of-life purposes.  All of her questions were answered.  And she knows to call me in the interim with any concerns.    Return in about 2 months (around 9/22/2025) for 20 minute follow up visit.    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  It was ER/[place] positive HER2 negative.  MammaPrint was not sent due to small amount of tissue on biopsy.  The patient had a seroma drained by Dr. Bates this week but no other active infections.  She was on vaginal estrogen up until her diagnosis and she has stopped it.  She has had some issues in the past with vaginal dryness and painful sexual intercourse.  She has occasional hot flashes.  She has a history of osteopenia with her last DEXA scan showing a T-score of -1.7 in the left femoral neck.  She has no other significant medical problems and is in her normal state of health otherwise.  No headache, chest pain shortness of breath, new or abnormal back pain.  Her genetic testing was negative.  She is a retired physical therapist.  No tobacco, she drinks wine socially, no dental problems.    Oncology History   Cancer Staging   Malignant neoplasm of upper-inner quadrant of left breast in female, estrogen receptor positive (HCC)  Staging form: Breast, AJCC 8th Edition  - Clinical stage from 5/21/2025: Stage IA (cT1a, cN0, cM0, G2, ER+, [place]+, HER2-) - Signed by Areli Bates MD on 6/4/2025  Stage prefix: Initial diagnosis  Method of lymph node assessment: Clinical  Histologic grading system: 3 grade system  - Pathologic stage from 7/8/2025: Stage IA (pT1b, pN0(sn), cM0, G1, ER+, [place]+, HER2-) - Signed by  Areli Bates MD on 7/21/2025  Stage prefix: Initial diagnosis  Method of lymph node assessment: [place] lymph node biopsy  Histologic grading system: 3 grade system  Oncology History Overview Note   7/8/2025 - left sided partial mastectomy with SLNBX for an invasive ductal carcinoma, grade 1 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, pT1b(9mm)N0M0, ER % [place] 61-70% Her2 1+    No mammaprint due to insufficient tissue on biopsy     Malignant neoplasm of upper-inner quadrant of left breast in female, estrogen receptor positive (HCC)   5/21/2025 Biopsy    Left US-guided bx    11:00 4cmfn  IDC  Grade 2  ER , [place] 61-70, HER2 1+  12mm     5/21/2025 -  Cancer Staged    Staging form: Breast, AJCC 8th Edition  - Clinical stage from 5/21/2025: Stage IA (cT1a, cN0, cM0, G2, ER+, [place]+, HER2-) - Signed by Areli Bates MD on 6/4/2025  Stage prefix: Initial diagnosis  Method of lymph node assessment: Clinical  Histologic grading system: 3 grade system       5/27/2025 Genetic Testing    [facility] BRCAplus [facility] Cancer +RNAInsight     [facility]     6/3/2025 Observation    B/L breast MRI    IMPRESSION:   1.  12 x 11 x 8 mm heterogeneously enhancing mass 11 o'clock position left breast in keeping with biopsy-proven carcinoma.  No additional suspicious enhancement in the left breast.  No suspicious axillary or internal mammary lymph nodes.  2.  No suspicious enhancement in the right breast.  3.  Multiple T2 hyperintense lesions within the liver suggestive of cysts.  Further characterization with liver ultrasound could be considered as clinically warranted.     7/8/2025 Surgery    Left breast TUSHAR-localized lumpectomy w/ SLNB    IDC  Grade 1  9mm  DCIS present, grade 2  All margins negative for invasive carcinoma and DCIS  0/2 sentinel lymph nodes     7/8/2025 -  Cancer Staged    Staging form: Breast, AJCC 8th Edition  - Pathologic stage from 7/8/2025: Stage IA (pT1b, pN0(sn), cM0, G1, ER+, [place]+, HER2-) - Signed by  "Areli Bates MD on 7/21/2025  Stage prefix: Initial diagnosis  Method of lymph node assessment: [place] lymph node biopsy  Histologic grading system: 3 grade system          Pertinent Medical History      07/22/25: osteopenia     Review of Systems otherwise neg        Objective   /82 (BP Location: Right arm, Patient Position: Sitting, Cuff Size: Adult)   Pulse 84   Temp 98 °F (36.7 °C) (Temporal)   Resp 16   Ht 5' 3\" (1.6 m)   Wt 54.3 kg (119 lb 9.6 oz)   SpO2 98%   BMI 21.19 kg/m²     Pain Screening:  Pain Score: 0-No pain  ECOG   0  Physical Exam    GEN: Alert, awake oriented x3, in no acute distress  HEENT- No pallor, icterus, cyanosis, no oral mucosal lesions,   LAD - no palpable cervical, clavicle, axillary, inguinal LAD  Heart- normal S1 S2, regular rate and rhythm, No murmur, rubs.   Lungs- clear breathing sound bilateral.   Abdomen- soft, Non tender, bowel sounds present  Extremities- No cyanosis, clubbing, edema  Neuro- No focal neurological deficit  Breast - well healing surgical incision on the left, residual seroma at SLNBX site on left    Labs: I have reviewed the following labs:  Lab Results   Component Value Date/Time    WBC 4.21 (L) 06/10/2025 11:41 AM    RBC 4.59 06/10/2025 11:41 AM    Hemoglobin 15.2 06/10/2025 11:41 AM    Hematocrit 44.7 06/10/2025 11:41 AM    MCV 97 06/10/2025 11:41 AM    MCH 33.1 06/10/2025 11:41 AM    RDW 12.6 06/10/2025 11:41 AM    Platelets 222 06/10/2025 11:41 AM    Segmented % 59 06/10/2025 11:41 AM    Lymphocytes % 31 06/10/2025 11:41 AM    Monocytes % 8 06/10/2025 11:41 AM    Eosinophils Relative 1 06/10/2025 11:41 AM    Basophils Relative 1 06/10/2025 11:41 AM    Immature Grans % 0 06/10/2025 11:41 AM    Absolute Neutrophils 2.46 06/10/2025 11:41 AM     Lab Results   Component Value Date/Time    Potassium 4.0 06/10/2025 11:41 AM    Chloride 100 06/10/2025 11:41 AM    CO2 29 06/10/2025 11:41 AM    BUN 13 06/10/2025 11:41 AM    Creatinine 0.68 06/10/2025 " 11:41 AM    Calcium 10.1 06/10/2025 11:41 AM    AST 21 06/10/2025 11:41 AM    ALT 21 06/10/2025 11:41 AM    Alkaline Phosphatase 36 06/10/2025 11:41 AM    Total Protein 7.3 06/10/2025 11:41 AM    Albumin 4.8 06/10/2025 11:41 AM    Total Bilirubin 0.95 06/10/2025 11:41 AM    eGFR 92 06/10/2025 11:41 AM

## 2025-07-22 NOTE — ASSESSMENT & PLAN NOTE
Ms. Kumari is a 65-year-old postmenopausal woman who had screening ultrasound that showed a mass in the upper central left breast, biopsy showed a grade 2 invasive duct carcinoma the strongly estrogen and progesterone receptor positive, HER2 negative, insufficient tissue for MammaPrint.  She underwent a lumpectomy and sentinel node biopsy by Dr. Bates on July 8, 2025.  She had aspiration of an axillary seroma which is reaccumulated.  Final pathology showed a 9 mm grade 1 invasive ductal carcinoma with negative margins of excision and negative sentinel nodes, pathologic stage pT1b pN0 cM0, stage IA.    I explained to the patient that radiation is used to eradicate microscopic deposits of disease which may reside in the breast after surgery.  Radiation therefore reduces the risk of local recurrence and contributes to overall survival benefit.  I described the randomized trials comparing mastectomy to lumpectomy with or without radiation, showing equivalent outcomes and establishing the efficacy and safety of breast conservation treatment with lumpectomy and radiation.  I explained that lumpectomy alone is associated with a higher risk of local recurrence.  I described the randomized trials comparing different fractionation regimens for treatment of the left breast including hypofractionated whole breast radiation and accelerated partial breast radiation, which in several large randomized trials have shown excellent 10-year local recurrence rates of 2 to 3%.  I described to the patient the ongoing randomized trial, NRG , which randomizes women with a low molecular risk stage I breast cancer based on low risk Oncotype recurrence score to endocrine therapy alone with or without standard of care breast radiation.  Patient declined study enrollment and does not wish for Oncotype recurrence score testing.  I described the procedure involving radiation to the left breast including the importance of cardiac avoidance  using deep inspiration breath-hold, cardiac blocking, partial breast technique.  I described the potential acute and long-term side effects of left breast radiation.  Acutely she may experience localized skin irritation, swelling in the breast or fatigue.  Late effects can include the possibility fibrosis formation which could lead to changes in texture or cosmesis.  Patient expressed understanding these recommendations.  She would like to proceed with accelerated partial breast irradiation as technically feasible.  Informed consent was read by me and signed by the patient today.  CT simulation was scheduled for 2 weeks to allow for additional wound healing.

## 2025-07-22 NOTE — PROGRESS NOTES
Anastasiia Kumari 1960 is a 65 y.o. female with left breast cancer. She was referred by Dr. Bates. She is being seen today for Radiation Oncology consult.     Patient had ABUS on 5/14/25 which showed a 12 mm oval mass in the left breast at 11 o'clock 4 cmfn. Additional US of left breast on 5/21/25 showed the hypoechocic mass in the left breast and biopsy recommended. 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 She met with Dr. Bates and opted for breast conservation. Genetic testing was negative.       5/14/25 ABUS  FINDINGS:   LEFT  1) MASS [A]: There is a 12 mm oval, hypoechoic mass seen in the left breast at 11 o'clock in the posterior depth, 4 cm from the nipple.      Right  There are no suspicious masses or areas of architectural distortion.      IMPRESSION:   Possible mass in the left breast is incompletely characterized.  Targeted ultrasound is indicated.     No ultrasound evidence of invasive malignancy in the right breast.     Automated breast ultrasound is an adjunct, not a replacement, for routine yearly screening mammography.     ASSESSMENT/BI-RADS CATEGORY:  Left: 0 - Incomplete: Needs Additional Imaging Evaluation  Right: 1 - Negative  Overall: 0 - Incomplete: Needs Additional Imaging Evaluation     RECOMMENDATION:       - Ultrasound at the current time for the left breast.       - Continue annual screening mammography for the right breast.       - ABUS in 1 year for the right breast.      5/21/25 US left breast diagnostic  FINDINGS:   LEFT  1) MASS [A]: There is a 12 mm x 7 mm x 14 mm oval, parallel, hypoechoic mass with indistinct margins seen in the left breast at 11 o'clock in the posterior depth, 4 cm from the nipple.       IMPRESSION:   Hypoechoic mass in the left breast 11:00, 4 cm from the nipple, suspicious.  Ultrasound-guided biopsy  recommended.     ASSESSMENT/BI-RADS CATEGORY:  Left: 4A - Low Suspicion for Malignancy  Overall: 4 - Suspicious     RECOMMENDATION:       - Ultrasound-guided breast biopsy for the left breast.       6/3/25 Mammo breast bilateral  FINDINGS:   LEFT  1) MASS [A]: There is a 12 mm x 8 mm x 11 mm irregularly shaped mass with irregular margins and heterogeneous internal enhancement seen in the left breast at 11 o'clock in the posterior depth, 4 cm from the nipple. There is an associated barbell-shaped clip.  There is a low T1 and T2 signal area centrally in keeping with biopsy clip.  This is best visualized on subtraction image 108.  This has mixed kinetics.  This is located 10 mm anterior to the adjacent chest wall musculature and 15 mm from the medial skin surface.     There is no additional suspicious mass, non-mass enhancement or focus of enhancement within the left breast.     Right  There are no suspicious enhancing masses or suspicious areas of non-mass enhancement. There is no axillary or internal mammary adenopathy.      There are multiple T2 hyperintense lesions within the liver suggestive of cysts.     IMPRESSION:   1.  12 x 11 x 8 mm heterogeneously enhancing mass 11 o'clock position left breast in keeping with biopsy-proven carcinoma.  No additional suspicious enhancement in the left breast.  No suspicious axillary or internal mammary lymph nodes.  2.  No suspicious enhancement in the right breast.  3.  Multiple T2 hyperintense lesions within the liver suggestive of cysts.  Further characterization with liver ultrasound could be considered as clinically warranted.     ASSESSMENT/BI-RADS CATEGORY:  Left: 6 - Known Biopsy-Proven Malignancy  Right: 1 - Negative  Overall: 6 - Known Biopsy-Proven Malignancy     RECOMMENDATION:       - Surgical consultation for the left breast.       - Continue annual screening mammography for the right breast.       - Breast MRI Screening in 1 year for the right  breast.      25 Dr. Bates  Good candidate for breast conservation. Patient wishes to proceed with this. Need updated mammogram. Mammaprint not performed due to small component of invasion.         25 mammo diagnostic bilateral  FINDINGS:   Bilateral  There are no new suspicious masses, grouped microcalcifications or areas of unexplained architectural distortion.  A dragonfly marker in the upper inner left breast corresponds with the site of biopsy-proven carcinoma.  A queried asymmetry just posterior to the nipple effaces on the supplemental spot compression view.  No prior suspicious correlate was seen on recent MRI.  This is consistent with overlapping fibroglandular tissue.  There has been no significant change in the appearance of the right breast since prior study.  The skin and nipple areolar complex are unremarkable.      IMPRESSION:   Biopsy-proven malignancy in the left breast denoted by dragonfly marker.  There is no evidence of malignancy in the right breast.     ASSESSMENT/BI-RADS CATEGORY:  Left: 6 - Known Biopsy-Proven Malignancy  Right: 2 - Benign  Overall: 6 - Known Biopsy-Proven Malignancy     RECOMMENDATION:       - Surgical consultation for the left breast.       - Diagnostic mammogram in 1 year for the right breast.      25 Lumpectomy  Left breast TUSHAR-localized lumpectomy w/ SLNB  IDC  Grade 1  9mm  DCIS present, grade 2  All margins negative for invasive carcinoma and DCIS  0/2 sentinel lymph nodes      25 Dr. Bates  Healing well with no signs of infection   aspirated a seroma today from the left axilla with clear serous fluid obtained. I advised her to continue using warm compresses.   see her again in 6 months for survivorship visit.     25 Dr. Soler  Note not complete. Anastrozole prescribed      Upcomin25 DEXA scan  25 Surg Onc    Oncology History   Malignant neoplasm of upper-inner quadrant of left breast in female, estrogen receptor positive (HCC)    5/21/2025 Biopsy    Left US-guided bx    11:00 4cmfn  IDC  Grade 2  ER , VT 61-70, HER2 1+  12mm     5/21/2025 -  Cancer Staged    Staging form: Breast, AJCC 8th Edition  - Clinical stage from 5/21/2025: Stage IA (cT1a, cN0, cM0, G2, ER+, VT+, HER2-) - Signed by Areli Bates MD on 6/4/2025  Stage prefix: Initial diagnosis  Method of lymph node assessment: Clinical  Histologic grading system: 3 grade system       5/27/2025 Genetic Testing    [facility]     [facility]     6/3/2025 Observation    B/L breast MRI    IMPRESSION:   1.  12 x 11 x 8 mm heterogeneously enhancing mass 11 o'clock position left breast in keeping with biopsy-proven carcinoma.  No additional suspicious enhancement in the left breast.  No suspicious axillary or internal mammary lymph nodes.  2.  No suspicious enhancement in the right breast.  3.  Multiple T2 hyperintense lesions within the liver suggestive of cysts.  Further characterization with liver ultrasound could be considered as clinically warranted.     7/8/2025 Surgery    Left breast TUSHAR-localized lumpectomy w/ SLNB    IDC  Grade 1  9mm  DCIS present, grade 2  All margins negative for invasive carcinoma and DCIS  0/2 sentinel lymph nodes     7/8/2025 -  Cancer Staged    Staging form: Breast, AJCC 8th Edition  - Pathologic stage from 7/8/2025: Stage IA (pT1b, pN0(sn), cM0, G1, ER+, VT+, HER2-) - Signed by Areli Bates MD on 7/21/2025  Stage prefix: Initial diagnosis  Method of lymph node assessment: [place] lymph node biopsy  Histologic grading system: 3 grade system           Review of Systems:  Review of Systems   Constitutional: Negative.    HENT: Negative.     Eyes: Negative.    Respiratory: Negative.     Cardiovascular: Negative.    Gastrointestinal: Negative.    Endocrine: Negative.    Genitourinary: Negative.    Musculoskeletal:  Positive for back pain (chronic).   Skin: Negative.    Allergic/Immunologic: Negative.    Neurological: Negative.   "  Hematological: Negative.    Psychiatric/Behavioral: Negative.         Clinical Trial: no    OB/GYN History:  The patient underwent menarche at 13 years  Menopause Status Post  No LMP recorded. Patient is postmenopausal.  Menopause at 55 years.  Menopause Reason natural  Hormone replacement therapy: Premarin vaginal cream off and on   4   Para 3   Age at first delivery being 27 years.   Nursing: not applicable.   Birth control pills: yes.  Years used 5    Pregnancy test needed:  no    Prior Radiation no    History of autoimmune or connective tissue disorder no    Teaching NCI RT packet given    Implantable Devices (Port, Pacemaker, pain stimulator) no    Hip Replacement no    Health Maintenance   Topic Date Due    Hepatitis C Screening  Never done    HIV Screening  Never done    Pneumococcal Vaccine: 50+ Years (3 of 3 - PCV20 or PCV21) 12/10/2023    COVID-19 Vaccine (5 - - season) 2024    Depression Screening  10/16/2024    Annual Physical  10/16/2024    Fall Risk  Never done    Urinary Incontinence Screening  Never done    Influenza Vaccine (1) 2025    Breast Cancer Screening: Mammogram  2026    BMI: Adult  2026    Colorectal Cancer Screening  2031    DTaP,Tdap,and Td Vaccines (2 - Td or Tdap) 2033    RSV Vaccine for Pregnant Patients and Patients Age 60+ Years (1 - 1-dose 75+ series) 07/10/2035    Osteoporosis Screening  Completed    Zoster Vaccine  Completed    Meningococcal B Vaccine  Aged Out    RSV Vaccine age 0-20 Months  Aged Out    HIB Vaccine  Aged Out    IPV Vaccine  Aged Out    Hepatitis A Vaccine  Aged Out    Meningococcal ACWY Vaccine  Aged Out    HPV Vaccine  Aged Out     Past Medical History[1]  Past Surgical History[2]  Family History[3]  Social History[4]   Current Medications[5]  Allergies[6]   Vitals:    25 1243   Height: 5' 3\" (1.6 m)               [1]   Past Medical History:  Diagnosis Date    Miscarriage    [2]   Past Surgical " History:  Procedure Laterality Date    BREAST BIOPSY      DILATION AND CURETTAGE OF UTERUS  1987    miscarriage    AK BX/EXC LYMPH NODE OPEN SUPERFICIAL Left 7/8/2025    Procedure: LEFT BREAST TUSHAR-LOCALIZED LUMPECTOMY WITH BIOPSY LYMPH NODE SENTINEL & LYMPHATIC MAPPING; 1000 nuc med;  Surgeon: Areli Bates MD;  Location: [facility];  Service: Surgical Oncology    AK EXC BREAST LES PREOP PLMT RAD MARKER OPEN 1 LES Left 7/8/2025    Procedure: LEFT BREAST TUSHAR-LOCALIZED LUMPECTOMY WITH BIOPSY LYMPH NODE SENTINEL & LYMPHATIC MAPPING; 1000 nuc med;  Surgeon: Areli Bates MD;  Location: [facility];  Service: Surgical Oncology    AK INTRAOP SENTINEL LYMPH NODE ID W/DYE INJECTION Left 7/8/2025    Procedure: LEFT BREAST TUSHAR-LOCALIZED LUMPECTOMY WITH BIOPSY LYMPH NODE SENTINEL & LYMPHATIC MAPPING; 1000 nuc med;  Surgeon: Areli Bates MD;  Location: [facility];  Service: Surgical Oncology    AK MASTECTOMY PARTIAL Left 7/8/2025    Procedure: LEFT BREAST TUSHAR-LOCALIZED LUMPECTOMY WITH BIOPSY LYMPH NODE SENTINEL & LYMPHATIC MAPPING; 1000 nuc med;  Surgeon: Areli Bates MD;  Location: [facility];  Service: Surgical Oncology    US BREAST NEEDLE LOC LEFT Left 6/27/2025    US GUIDED BREAST BIOPSY LEFT COMPLETE Left 05/21/2025   [3]   Family History  Problem Relation Name Age of Onset    Atrial fibrillation Mother leigha     Heart disease Mother leigha     Hypertension Mother leigha     Melanoma Father  86    No Known Problems Maternal Grandmother fancis     No Known Problems Maternal Grandfather      No Known Problems Paternal Grandmother iesha     No Known Problems Paternal Grandfather      Lung cancer Paternal Aunt shiva 82    Heart disease Family      Hypertension Family     [4]   Social History  Tobacco Use    Smoking status: Never     Passive exposure: Past    Smokeless tobacco: Never   Vaping Use    Vaping status: Never Used   Substance Use Topics    Alcohol use: Yes     Alcohol/week: 7.0 standard drinks of alcohol     Types: 7  Glasses of wine per week     Comment: laST dharmeshkd 7/7    Drug use: Never   [5]   Current Outpatient Medications:     anastrozole (ARIMIDEX) 1 mg tablet, Take 1 tablet (1 mg total) by mouth daily, Disp: 30 tablet, Rfl: 2    estrogens, conjugated (Premarin) vaginal cream, Insert 1 g into the vagina 2 (two) times a week, Disp: 90 g, Rfl: 3    fluticasone (FLONASE) 50 mcg/act nasal spray, 1 spray into each nostril daily, Disp: 18.2 mL, Rfl: 2    multivitamin (THERAGRAN) TABS, Take 1 tablet by mouth daily, Disp: , Rfl:     traMADol (Ultram) 50 mg tablet, Take 1 tablet (50 mg total) by mouth every 8 (eight) hours as needed for severe pain, Disp: 9 tablet, Rfl: 0  [6] No Known Allergies

## 2025-08-13 ENCOUNTER — PATIENT OUTREACH (OUTPATIENT)
Dept: HEMATOLOGY ONCOLOGY | Facility: CLINIC | Age: 65
End: 2025-08-13

## 2025-08-15 ENCOUNTER — TELEPHONE (OUTPATIENT)
Age: 65
End: 2025-08-15

## 2025-08-16 DIAGNOSIS — Z17.0 MALIGNANT NEOPLASM OF UPPER-INNER QUADRANT OF LEFT BREAST IN FEMALE, ESTROGEN RECEPTOR POSITIVE (HCC): ICD-10-CM

## 2025-08-16 DIAGNOSIS — C50.212 MALIGNANT NEOPLASM OF UPPER-INNER QUADRANT OF LEFT BREAST IN FEMALE, ESTROGEN RECEPTOR POSITIVE (HCC): ICD-10-CM

## 2025-08-18 RX ORDER — ANASTROZOLE 1 MG/1
1 TABLET ORAL DAILY
Qty: 90 TABLET | Refills: 1 | Status: SHIPPED | OUTPATIENT
Start: 2025-08-18

## 2025-08-21 LAB
RAD ONC ARIA COURSE FIRST TREATMENT DATE: NORMAL
RAD ONC ARIA COURSE ID: NORMAL
RAD ONC ARIA COURSE INTENT: NORMAL
RAD ONC ARIA COURSE LAST TREATMENT DATE: NORMAL
RAD ONC ARIA COURSE SESSION NUMBER: 1
RAD ONC ARIA COURSE START DATE: NORMAL
RAD ONC ARIA COURSE TREATMENT ELAPSED DAYS: 0
RAD ONC ARIA PLAN FRACTIONS TREATED TO DATE: 1
RAD ONC ARIA PLAN ID: NORMAL
RAD ONC ARIA PLAN NAME: NORMAL
RAD ONC ARIA PLAN PRESCRIBED DOSE PER FRACTION: 6 GY
RAD ONC ARIA PLAN PRIMARY REFERENCE POINT: NORMAL
RAD ONC ARIA PLAN TOTAL FRACTIONS PRESCRIBED: 5
RAD ONC ARIA PLAN TOTAL PRESCRIBED DOSE: 3000 CGY
RAD ONC ARIA REFERENCE POINT DOSAGE GIVEN TO DATE: 6 GY
RAD ONC ARIA REFERENCE POINT ID: NORMAL
RAD ONC ARIA REFERENCE POINT SESSION DOSAGE GIVEN: 6 GY

## 2025-08-22 LAB
RAD ONC ARIA COURSE FIRST TREATMENT DATE: NORMAL
RAD ONC ARIA COURSE ID: NORMAL
RAD ONC ARIA COURSE INTENT: NORMAL
RAD ONC ARIA COURSE LAST TREATMENT DATE: NORMAL
RAD ONC ARIA COURSE SESSION NUMBER: 2
RAD ONC ARIA COURSE START DATE: NORMAL
RAD ONC ARIA COURSE TREATMENT ELAPSED DAYS: 1
RAD ONC ARIA PLAN FRACTIONS TREATED TO DATE: 2
RAD ONC ARIA PLAN ID: NORMAL
RAD ONC ARIA PLAN NAME: NORMAL
RAD ONC ARIA PLAN PRESCRIBED DOSE PER FRACTION: 6 GY
RAD ONC ARIA PLAN PRIMARY REFERENCE POINT: NORMAL
RAD ONC ARIA PLAN TOTAL FRACTIONS PRESCRIBED: 5
RAD ONC ARIA PLAN TOTAL PRESCRIBED DOSE: 3000 CGY
RAD ONC ARIA REFERENCE POINT DOSAGE GIVEN TO DATE: 12 GY
RAD ONC ARIA REFERENCE POINT ID: NORMAL
RAD ONC ARIA REFERENCE POINT SESSION DOSAGE GIVEN: 6 GY

## (undated) DEVICE — SLEEVE SCD KNEE MEDIUM

## (undated) DEVICE — Device

## (undated) DEVICE — APPLIER SURGICLIP PREMIUM SMALL 9IN

## (undated) DEVICE — DRAPE SHEET THREE QUARTER

## (undated) DEVICE — PACK PBDS GENERAL MINOR RF

## (undated) DEVICE — SURGICEL 4 X 8IN